# Patient Record
Sex: MALE | Race: WHITE | NOT HISPANIC OR LATINO | Employment: OTHER | URBAN - NONMETROPOLITAN AREA
[De-identification: names, ages, dates, MRNs, and addresses within clinical notes are randomized per-mention and may not be internally consistent; named-entity substitution may affect disease eponyms.]

---

## 2017-01-03 ENCOUNTER — OFFICE VISIT (OUTPATIENT)
Dept: SURGERY | Facility: CLINIC | Age: 57
End: 2017-01-03

## 2017-01-03 VITALS — WEIGHT: 240 LBS | HEIGHT: 73 IN | BODY MASS INDEX: 31.81 KG/M2

## 2017-01-03 DIAGNOSIS — Z48.89 SUTURE CHECK: Primary | ICD-10-CM

## 2017-01-03 PROCEDURE — 99024 POSTOP FOLLOW-UP VISIT: CPT | Performed by: SURGERY

## 2017-01-03 NOTE — MR AVS SNAPSHOT
Delmar Denise .   1/3/2017 11:20 AM   Office Visit    Dept Phone:  [phone]   Encounter #:  [id number]    Provider:  Sascha Hawkins MD   Department:  [facility] GENERAL SURGERY                Your Full Care Plan              Your Updated Medication List          This list is accurate as of: 1/3/17 11:50 AM.  Always use your most recent med list.                amoxicillin-clavulanate 875-125 MG per tablet   Commonly known as:  AUGMENTIN   Take 1 tablet by mouth 2 (Two) Times a Day for 10 days.       gabapentin 300 MG capsule   Commonly known as:  NEURONTIN       ibuprofen 800 MG tablet   Commonly known as:  ADVIL,MOTRIN               You Were Diagnosed With        Codes Comments    Suture check    -  Primary ICD-10-CM: Z48.89  ICD-9-CM: V58.49       Instructions     None    Patient Instructions History      Upcoming Appointments     Visit Type Date Time Department    FOLLOW UP 1/3/2017 11:20 AM [facility]      Inform Directhart Signup     [facility] CloudSplit allows you to send messages to your doctor, view your test results, renew your prescriptions, schedule appointments, and more. To sign up, go to [employer] and click on the Sign Up Now link in the New User? box. Enter your CloudSplit Activation Code exactly as it appears below along with the last four digits of your Social Security Number and your Date of Birth () to complete the sign-up process. If you do not sign up before the expiration date, you must request a new code.    CloudSplit Activation Code: [email or web address]  Expires: 2017  5:38 AM    If you have questions, you can email [email or web address] or call [phone] to talk to our CloudSplit staff. Remember, CloudSplit is NOT to be used for urgent needs. For medical emergencies, dial 911.               Other Info from Your Visit           Allergies     No Known Allergies      Reason for Visit     Follow-up Removal of sutures of  "back      Vital Signs     Height Weight Body Mass Index Smoking Status          73\" (185.4 cm) 240 lb (109 kg) 31.66 kg/m2 Never Smoker        Problems and Diagnoses Noted     Suture check      No Longer an Issue     Sebaceous cyst        "

## 2017-01-03 NOTE — PROGRESS NOTES
Subjective   Delmar Denise Sr. is a 56 y.o. male.     History of Present Illness His wound is healed fine.     The following portions of the patient's history were reviewed and updated as appropriate: allergies, current medications, past family history, past medical history, past social history, past surgical history and problem list.    Review of Systems    Objective   Physical Exam removed sutures.     Assessment/Plan   Delmar was seen today for follow-up.    Diagnoses and all orders for this visit:    Suture check      Return prn

## 2017-01-13 ENCOUNTER — OFFICE VISIT (OUTPATIENT)
Dept: RETAIL CLINIC | Facility: CLINIC | Age: 57
End: 2017-01-13

## 2017-01-13 VITALS
WEIGHT: 282 LBS | OXYGEN SATURATION: 98 % | TEMPERATURE: 98.5 F | RESPIRATION RATE: 18 BRPM | HEART RATE: 92 BPM | BODY MASS INDEX: 37.21 KG/M2

## 2017-01-13 DIAGNOSIS — J02.0 STREP PHARYNGITIS: Primary | ICD-10-CM

## 2017-01-13 LAB
EXPIRATION DATE: ABNORMAL
INTERNAL CONTROL: ABNORMAL
Lab: ABNORMAL
S PYO AG THROAT QL: POSITIVE

## 2017-01-13 PROCEDURE — 87880 STREP A ASSAY W/OPTIC: CPT | Performed by: NURSE PRACTITIONER

## 2017-01-13 PROCEDURE — 99213 OFFICE O/P EST LOW 20 MIN: CPT | Performed by: NURSE PRACTITIONER

## 2017-01-13 RX ORDER — AMOXICILLIN 500 MG/1
500 TABLET, FILM COATED ORAL 2 TIMES DAILY
Qty: 20 TABLET | Refills: 0 | Status: SHIPPED | OUTPATIENT
Start: 2017-01-13 | End: 2017-01-23

## 2017-01-13 NOTE — PROGRESS NOTES
Subjective   Delmar Denise Sr. is a 56 y.o. male.     Chief Complaint   Patient presents with   • Sore Throat      History of Present Illness   Presents to [facility] with c/o onset of sore throat for past few days. Has associated low grade temperature, PND with a cough which worsens at night. Has had ill contact with strep.     The following portions of the patient's history were reviewed and updated as appropriate: allergies, current medications, past family history, past medical history, past social history, past surgical history and problem list.      Current Outpatient Prescriptions:   •  gabapentin (NEURONTIN) 300 MG capsule, Take 300 mg by mouth Every Night., Disp: , Rfl:   •  ibuprofen (ADVIL,MOTRIN) 800 MG tablet, Take 800 mg by mouth Every 6 (Six) Hours As Needed for mild pain (1-3)., Disp: , Rfl:   •  amoxicillin (AMOXIL) 500 MG tablet, Take 1 tablet by mouth 2 (Two) Times a Day for 10 days., Disp: 20 tablet, Rfl: 0    Visit Vitals   • Pulse 92   • Temp 98.5 °F (36.9 °C) (Oral)   • Resp 18   • Wt 282 lb (128 kg)   • SpO2 98%   • BMI 37.21 kg/m2       Review of Systems   Constitutional: Positive for fever. Negative for chills.   HENT: Positive for sore throat (for past days). Negative for drooling, ear pain, facial swelling, rhinorrhea and sinus pressure.    Respiratory: Positive for cough. Negative for choking.    Skin: Negative for color change.   Neurological: Positive for headaches (mild).      No Known Allergies    Physical Exam   Constitutional: He appears well-developed and well-nourished.  Non-toxic appearance. He has a sickly appearance.   HENT:   Right Ear: Ear canal normal. Tympanic membrane is not erythematous and not bulging. Tympanic membrane mobility is normal.   Left Ear: Tympanic membrane and ear canal normal. Tympanic membrane is not erythematous and not bulging. Tympanic membrane mobility is normal.   Nose: No mucosal edema or rhinorrhea. Right sinus exhibits no maxillary sinus tenderness and  no frontal sinus tenderness. Left sinus exhibits no maxillary sinus tenderness and no frontal sinus tenderness.   Mouth/Throat: Uvula is midline. Mucous membranes are not dry. Oropharyngeal exudate and tonsillar abscesses present. Tonsils are 2+ on the right. Tonsils are 2+ on the left. Tonsillar exudate.   Eyes: Conjunctivae are normal.   Cardiovascular: Normal rate and regular rhythm.    Pulmonary/Chest: Breath sounds normal.   Lymphadenopathy:        Head (right side): Tonsillar adenopathy present. No preauricular adenopathy present.        Head (left side): Tonsillar adenopathy present. No preauricular adenopathy present.     He has cervical adenopathy.        Right cervical: Superficial cervical adenopathy present.        Left cervical: Superficial cervical adenopathy present.   Skin: Skin is warm and dry.      Assessment/Plan     Delmar was seen today for sore throat.    Diagnoses and all orders for this visit:    Strep pharyngitis  -     POC Rapid Strep A  -     amoxicillin (AMOXIL) 500 MG tablet; Take 1 tablet by mouth 2 (Two) Times a Day for 10 days.      Lab Results   Component Value Date    RAPSCRN Positive (A) 01/13/2017        Discussed POCT results. Discharge instructions provided.   Follow up with PCP or at the Urgent Care if symptoms worsen or fail to improve within next 48-72 hours.  Patient teaching information discussed and provided to the patient. Patient verbalized understanding.      January 13, 2017 10:24 AM

## 2017-01-13 NOTE — MR AVS SNAPSHOT
Delmar Coffman Camelia Sharpe.   1/13/2017 10:00 AM   Office Visit    Dept Phone:  [phone]   Encounter #:  [id number]    Provider:  ANITHA LIVINGSTON   Department:  [facility]                Your Full Care Plan              Today's Medication Changes          These changes are accurate as of: 1/13/17 10:11 AM.  If you have any questions, ask your nurse or doctor.               New Medication(s)Ordered:     amoxicillin 500 MG tablet   Commonly known as:  AMOXIL   Take 1 tablet by mouth 2 (Two) Times a Day for 10 days.            Where to Get Your Medications      These medications were sent to [id number] - [place], [place] - [address] [place] AT [facility] [address] - [phone] [facility] [phone] FX  [address] [place], [place] [address]     Phone:  [phone]     amoxicillin 500 MG tablet                  Your Updated Medication List          This list is accurate as of: 1/13/17 10:11 AM.  Always use your most recent med list.                amoxicillin 500 MG tablet   Commonly known as:  AMOXIL   Take 1 tablet by mouth 2 (Two) Times a Day for 10 days.       gabapentin 300 MG capsule   Commonly known as:  NEURONTIN       ibuprofen 800 MG tablet   Commonly known as:  ADVIL,MOTRIN               We Performed the Following     POC Rapid Strep A       You Were Diagnosed With        Codes Comments    Strep pharyngitis    -  Primary ICD-10-CM: J02.0  ICD-9-CM: 034.0       Instructions    Strep Throat  Strep throat is an infection of the throat. It is caused by germs. Strep throat spreads from person to person because of coughing, sneezing, or close contact.  HOME CARE  Medicines   · Take over-the-counter and prescription medicines only as told by your doctor.  · Take your antibiotic medicine as told by your doctor. Do not stop taking the medicine even if you feel better.  · Have family members who also have a sore throat or fever go to a doctor.  Eating and  Drinking   · Do not share food, drinking cups, or personal items.  · Try eating soft foods until your sore throat feels better.  · Drink enough fluid to keep your pee (urine) clear or pale yellow.  General Instructions  · Rinse your mouth (gargle) with a salt-water mixture 3-4 times per day or as needed. To make a salt-water mixture, stir ½-1 tsp of salt into 1 cup of warm water.  · Make sure that all people in your house wash their hands well.  · Rest.  · Stay home from school or work until you have been taking antibiotics for 24 hours.  · Keep all follow-up visits as told by your doctor. This is important.  GET HELP IF:  · Your neck keeps getting bigger.  · You get a rash, cough, or earache.  · You cough up thick liquid that is green, yellow-brown, or bloody.  · You have pain that does not get better with medicine.  · Your problems get worse instead of getting better.  · You have a fever.  GET HELP RIGHT AWAY IF:  · You throw up (vomit).  · You get a very bad headache.  · You neck hurts or it feels stiff.  · You have chest pain or you are short of breath.  · You have drooling, very bad throat pain, or changes in your voice.  · Your neck is swollen or the skin gets red and tender.  · Your mouth is dry or you are peeing less than normal.  · You keep feeling more tired or it is hard to wake up.  · Your joints are red or they hurt.     This information is not intended to replace advice given to you by your health care provider. Make sure you discuss any questions you have with your health care provider.     Document Released: 06/05/2009 Document Revised: 09/07/2016 Document Reviewed: 04/11/2016  [employer] Interactive Patient Education ©2016 [employer]       Patient Instructions History      Upcoming Appointments     Visit Type Date Time Department    OFFICE VISIT 1/13/2017 10:00 AM LARRY Forbes Signup     [facility] Consumer Agent Portal (CAP)[facility] allows you to send messages to your doctor, view your test results, renew  your prescriptions, schedule appointments, and more. To sign up, go to [email or web address] and click on the Sign Up Now link in the New User? box. Enter your [employer] Activation Code exactly as it appears below along with the last four digits of your Social Security Number and your Date of Birth () to complete the sign-up process. If you do not sign up before the expiration date, you must request a new code.    [employer] Activation Code: [email or web address]  Expires: 2017  5:38 AM    If you have questions, you can email [email or web address] or call [phone] to talk to our [employer] staff. Remember, [employer] is NOT to be used for urgent needs. For medical emergencies, dial 911.               Other Info from Your Visit           Allergies     No Known Allergies      Reason for Visit     Sore Throat           Vital Signs     Smoking Status                   Never Smoker           Problems and Diagnoses Noted     Strep throat    -  Primary

## 2019-01-27 ENCOUNTER — OFFICE VISIT (OUTPATIENT)
Dept: RETAIL CLINIC | Facility: CLINIC | Age: 59
End: 2019-01-27

## 2019-01-27 VITALS
SYSTOLIC BLOOD PRESSURE: 150 MMHG | DIASTOLIC BLOOD PRESSURE: 88 MMHG | OXYGEN SATURATION: 99 % | HEART RATE: 74 BPM | BODY MASS INDEX: 37.57 KG/M2 | TEMPERATURE: 98.2 F | WEIGHT: 284.8 LBS | RESPIRATION RATE: 18 BRPM

## 2019-01-27 DIAGNOSIS — J01.00 ACUTE MAXILLARY SINUSITIS, RECURRENCE NOT SPECIFIED: Primary | ICD-10-CM

## 2019-01-27 DIAGNOSIS — I10 HYPERTENSION, UNSPECIFIED TYPE: ICD-10-CM

## 2019-01-27 PROCEDURE — 99213 OFFICE O/P EST LOW 20 MIN: CPT | Performed by: NURSE PRACTITIONER

## 2019-01-27 RX ORDER — AMOXICILLIN AND CLAVULANATE POTASSIUM 875; 125 MG/1; MG/1
1 TABLET, FILM COATED ORAL EVERY 12 HOURS SCHEDULED
Qty: 20 TABLET | Refills: 0 | Status: SHIPPED | OUTPATIENT
Start: 2019-01-27 | End: 2019-02-06

## 2019-01-27 RX ORDER — MELOXICAM 15 MG/1
15 TABLET ORAL DAILY
COMMUNITY

## 2019-01-27 RX ORDER — FLUTICASONE PROPIONATE 50 MCG
2 SPRAY, SUSPENSION (ML) NASAL DAILY
Qty: 1 BOTTLE | Refills: 0 | Status: SHIPPED | OUTPATIENT
Start: 2019-01-27 | End: 2020-08-21

## 2020-08-20 ENCOUNTER — OFFICE VISIT (OUTPATIENT)
Dept: SURGERY | Facility: CLINIC | Age: 60
End: 2020-08-20

## 2020-08-20 VITALS
WEIGHT: 265 LBS | DIASTOLIC BLOOD PRESSURE: 101 MMHG | SYSTOLIC BLOOD PRESSURE: 141 MMHG | HEIGHT: 73 IN | BODY MASS INDEX: 35.12 KG/M2 | HEART RATE: 88 BPM

## 2020-08-20 DIAGNOSIS — Z01.818 PRE-OP TESTING: ICD-10-CM

## 2020-08-20 DIAGNOSIS — D17.0 LIPOMA OF HEAD: Primary | ICD-10-CM

## 2020-08-20 DIAGNOSIS — Z01.818 PREOP TESTING: Primary | ICD-10-CM

## 2020-08-20 PROCEDURE — 99202 OFFICE O/P NEW SF 15 MIN: CPT | Performed by: SURGERY

## 2020-08-20 RX ORDER — CEFAZOLIN SODIUM 2 G/50ML
2 SOLUTION INTRAVENOUS ONCE
Status: CANCELLED | OUTPATIENT
Start: 2020-08-20 | End: 2020-08-20

## 2020-08-20 NOTE — PROGRESS NOTES
"Subjective   Delmar Denise Sr. is a 60 y.o. male is being seen for consultation today for evaluation of a lipoma    History of Present Illness  Mr. Denise was seen in the office today for follow-up of a symptomatic lipoma.  The patient states that it has been present for 2 years but now has gotten larger and it is sore, particularly when he lays on it to sleep at night.  He states if he turns on that side it will wake him up from sleep.  No Known Allergies  Current Outpatient Medications   Medication Sig Dispense Refill   • fluticasone (FLONASE) 50 MCG/ACT nasal spray 2 sprays into the nostril(s) as directed by provider Daily. 1 bottle 0   • gabapentin (NEURONTIN) 300 MG capsule Take 300 mg by mouth Every Night.     • MELOXICAM PO Take  by mouth.       No current facility-administered medications for this visit.      Past Medical History:   Diagnosis Date   • Arthritis    • Back pain    • Hypertension    • Sinusitis    • Strep throat    • Urinary tract infection      Past Surgical History:   Procedure Laterality Date   • KNEE SURGERY     • KNEE SURGERY      bilateraly   • SHOULDER SURGERY     • SHOULDER SURGERY      bilateral     Review of Systems   Musculoskeletal: Positive for arthralgias, joint swelling, myalgias and neck stiffness.   Neurological: Positive for headaches.     General: weight gain 20 Lbs  Integumentary: negative  Eyes: negative  ENT: negative  Respiratory: negative  Gastrointestinal: negative  Cardiovascular: negative  Neurological: headaches  Psychiatric: negative  Hematologic/Lymphatic: negative  Genitourinary: negative  Musculoskeletal: painful joints  Endocrine: negative  Breasts: negative    The following portions of the patient's history were reviewed and updated as appropriate: allergies, current medications, past family history, past medical history, past social history, past surgical history and problem list.    Objective   BP (!) 141/101   Pulse 88   Ht 185.4 cm (73\")   Wt 120 kg (265 " lb)   BMI 34.96 kg/m²   Physical Exam  General:  This is a WD WN male in no acute distress  HEENT exam:  WNL. Sclera are anicteric.  EOMI  Neck:  supple, FROM, without thyromegaly, cervical or supraclavicular adenopathy  Lungs:  Respiratory effort normal. Auscultation: Clear, without wheezes, rhonchi, rales  Heart:  Regular rate and rhythm, without murmur, gallop, rub.  No pedal edema  Musculoskeletal:  muscle strength/tone is normal.  Gait and station: normal. No digital cyanosis  Psyc:  alert, oriented x 3.  Mood and affect are appropriate  skin:  Warm with good turgor.  On the left posterior lateral scalp there is a 3 x 3 cm subcutaneous mass consistent with a lipoma  extremities:  Examination of the extremities revealed no cyanosis, clubbing or edema.    Results/Data    Procedures       Assessment/Plan   Symptomatic lipoma of scalp    Proceed with excision under anesthesia.  Given size and location this is best done in the OR       Discussion/Summary    Patient's Body mass index is 34.96 kg/m². BMI is above normal parameters. Recommendations include: educational material.       No future appointments.      Please note that portions of this note were completed with a voice recognition program.

## 2020-08-21 ENCOUNTER — APPOINTMENT (OUTPATIENT)
Dept: PREADMISSION TESTING | Facility: HOSPITAL | Age: 60
End: 2020-08-21

## 2020-08-21 ENCOUNTER — LAB (OUTPATIENT)
Dept: LAB | Facility: HOSPITAL | Age: 60
End: 2020-08-21

## 2020-08-21 DIAGNOSIS — Z01.818 PRE-OP TESTING: ICD-10-CM

## 2020-08-21 DIAGNOSIS — D17.0 LIPOMA OF HEAD: ICD-10-CM

## 2020-08-21 DIAGNOSIS — Z01.818 PREOP TESTING: ICD-10-CM

## 2020-08-21 LAB
ANION GAP SERPL CALCULATED.3IONS-SCNC: 9.5 MMOL/L (ref 5–15)
BUN SERPL-MCNC: 24 MG/DL (ref 8–23)
BUN/CREAT SERPL: 24.2 (ref 7–25)
CALCIUM SPEC-SCNC: 9.2 MG/DL (ref 8.6–10.5)
CHLORIDE SERPL-SCNC: 105 MMOL/L (ref 98–107)
CO2 SERPL-SCNC: 22.5 MMOL/L (ref 22–29)
CREAT SERPL-MCNC: 0.99 MG/DL (ref 0.76–1.27)
DEPRECATED RDW RBC AUTO: 43.7 FL (ref 37–54)
ERYTHROCYTE [DISTWIDTH] IN BLOOD BY AUTOMATED COUNT: 13.6 % (ref 12.3–15.4)
GFR SERPL CREATININE-BSD FRML MDRD: 77 ML/MIN/1.73
GLUCOSE SERPL-MCNC: 87 MG/DL (ref 65–99)
HCT VFR BLD AUTO: 44.7 % (ref 37.5–51)
HGB BLD-MCNC: 14.2 G/DL (ref 13–17.7)
MCH RBC QN AUTO: 27.7 PG (ref 26.6–33)
MCHC RBC AUTO-ENTMCNC: 31.8 G/DL (ref 31.5–35.7)
MCV RBC AUTO: 87.3 FL (ref 79–97)
PLATELET # BLD AUTO: 207 10*3/MM3 (ref 140–450)
PMV BLD AUTO: 9 FL (ref 6–12)
POTASSIUM SERPL-SCNC: 4.6 MMOL/L (ref 3.5–5.2)
RBC # BLD AUTO: 5.12 10*6/MM3 (ref 4.14–5.8)
SODIUM SERPL-SCNC: 137 MMOL/L (ref 136–145)
WBC # BLD AUTO: 6.61 10*3/MM3 (ref 3.4–10.8)

## 2020-08-21 PROCEDURE — U0002 COVID-19 LAB TEST NON-CDC: HCPCS

## 2020-08-21 PROCEDURE — 80048 BASIC METABOLIC PNL TOTAL CA: CPT | Performed by: SURGERY

## 2020-08-21 PROCEDURE — 85027 COMPLETE CBC AUTOMATED: CPT | Performed by: SURGERY

## 2020-08-21 PROCEDURE — 36415 COLL VENOUS BLD VENIPUNCTURE: CPT

## 2020-08-21 PROCEDURE — C9803 HOPD COVID-19 SPEC COLLECT: HCPCS | Performed by: SURGERY

## 2020-08-21 PROCEDURE — U0004 COV-19 TEST NON-CDC HGH THRU: HCPCS

## 2020-08-21 NOTE — DISCHARGE INSTRUCTIONS
TAKE the following medications the morning of surgery:    All heart or blood pressure medications    Please discontinue all blood thinners and anticoagulants (except aspirin) prior to surgery as per your surgeon and cardiologist instructions.  Aspirin may be continued up to the day prior to surgery.    HOLD all diabetic medications the morning of surgery as order by physician.    Arrival time for surgery on 8/25/20 will be given to you by Dr. Celaya's office.    A RESPONSIBLE PERSON MUST REMAIN IN THE WAITING ROOM DURING YOUR PROCEDURE AND A RESPONSIBLE  MUST BE AVAILABLE UPON YOUR DISCHARGE.    General Instructions:  • Do NOT eat or drink after midnight 8/24/20 which includes water, mints, or gum.  • You may brush your teeth. Dental appliances that are removable must be taken out day of surgery.  • Do NOT smoke, chew tobacco, or drink alcohol within 24 hours prior to surgery.  • Bring medications in original bottles, any inhalers and if applicable your C-PAP/BI-PAP machine  • Bring any papers given to you in the doctor’s office  • Wear clean, comfortable clothes and socks  • Do NOT wear contact lenses or make-up or dark nail polish.  Bring a case for your glasses if applicable.  • Bring crutches or walker if applicable  • Leave all other valuables and jewelry at home  • If you were given a blood bank armband, continue to wear it until discharged.    Preventing a Surgical Site Infection:  • Shower the night before surgery (unless instructed otherwise) using a fresh bar of anti-bacterial soap (such as Dial) and clean washcloth.  Dry with a clean towel and dress in clean clothing.  • For 2 to 3 days before surgery, avoid shaving with a razor near where you will have surgery because the razor can irritate skin and make it easier to develop an infection.  Ask your surgeon if you will be receiving antibiotics prior to surgery.  • Make sure you, your family, and all healthcare providers clean their hands with soap  and water or an alcohol-based hand  before caring for you or your wound.  • If at all possible, quit smoking as many days before surgery as you can.    Day of Surgery:  Upon arrival, a pre-op nurse and anesthesiologist will review your health history, obtain vital signs, and answer questions you may have.  The only belongings needed at this time will be your home medications and if applicable you C-PAP/BI-PAP machine.  If you are staying overnight, your family can leave the rest of your belongings in the car and bring them to your room later.  A pre-op nurse will start an IV and you may receive medication in preparation for surgery.  Due to patient privacy and limited space, only one member of your family will be able to accompany you in the pre-op area.  While you are in surgery your family should notify the waiting room  if they leave the waiting room area and provide a contact number.  Please be aware that surgery does come with discomfort.  We want to make every effort to control your discomfort so please discuss any uncontrolled symptoms with your nurse.  Your doctor will most likely have prescribed pain medications.  If you are going home after surgery you will receive individualized written care instructions before being discharged.  A responsible adult must drive you to and from the hospital on the day of surgery and stay with you for 24 hours.  If you are staying overnight following surgery, you will be transported to your hospital room following the recovery period.

## 2020-08-22 LAB
REF LAB TEST METHOD: NORMAL
SARS-COV-2 RNA RESP QL NAA+PROBE: NOT DETECTED

## 2020-08-22 NOTE — H&P
"Subjective   Delmar Denise Sr. is a 60 y.o. male is being seen for consultation today for evaluation of a lipoma    History of Present Illness  Mr. Denise was seen in the office today for follow-up of a symptomatic lipoma.  The patient states that it has been present for 2 years but now has gotten larger and it is sore, particularly when he lays on it to sleep at night.  He states if he turns on that side it will wake him up from sleep.  No Known Allergies  Current Outpatient Medications   Medication Sig Dispense Refill   • fluticasone (FLONASE) 50 MCG/ACT nasal spray 2 sprays into the nostril(s) as directed by provider Daily. 1 bottle 0   • gabapentin (NEURONTIN) 300 MG capsule Take 300 mg by mouth Every Night.     • MELOXICAM PO Take  by mouth.       No current facility-administered medications for this visit.      Past Medical History:   Diagnosis Date   • Arthritis    • Back pain    • Hypertension    • Sinusitis    • Strep throat    • Urinary tract infection      Past Surgical History:   Procedure Laterality Date   • KNEE SURGERY     • KNEE SURGERY      bilateraly   • SHOULDER SURGERY     • SHOULDER SURGERY      bilateral     Review of Systems   Musculoskeletal: Positive for arthralgias, joint swelling, myalgias and neck stiffness.   Neurological: Positive for headaches.     General: weight gain 20 Lbs  Integumentary: negative  Eyes: negative  ENT: negative  Respiratory: negative  Gastrointestinal: negative  Cardiovascular: negative  Neurological: headaches  Psychiatric: negative  Hematologic/Lymphatic: negative  Genitourinary: negative  Musculoskeletal: painful joints  Endocrine: negative  Breasts: negative    The following portions of the patient's history were reviewed and updated as appropriate: allergies, current medications, past family history, past medical history, past social history, past surgical history and problem list.    Objective   BP (!) 141/101   Pulse 88   Ht 185.4 cm (73\")   Wt 120 kg (265 " lb)   BMI 34.96 kg/m²    Physical Exam  General:  This is a WD WN male in no acute distress  HEENT exam:  WNL. Sclera are anicteric.  EOMI  Neck:  supple, FROM, without thyromegaly, cervical or supraclavicular adenopathy  Lungs:  Respiratory effort normal. Auscultation: Clear, without wheezes, rhonchi, rales  Heart:  Regular rate and rhythm, without murmur, gallop, rub.  No pedal edema  Musculoskeletal:  muscle strength/tone is normal.  Gait and station: normal. No digital cyanosis  Psyc:  alert, oriented x 3.  Mood and affect are appropriate  skin:  Warm with good turgor.  On the left posterior lateral scalp there is a 3 x 3 cm subcutaneous mass consistent with a lipoma  extremities:  Examination of the extremities revealed no cyanosis, clubbing or edema.    Results/Data    Procedures       Assessment/Plan   Symptomatic lipoma of scalp    Proceed with excision under anesthesia.  Given size and location this is best done in the OR       Discussion/Summary    Patient's Body mass index is 34.96 kg/m². BMI is above normal parameters. Recommendations include: educational material.       No future appointments.      Please note that portions of this note were completed with a voice recognition program.  This document has been electronically signed by Sravani SINHA MD on August 22, 2020 19:54

## 2020-08-24 ENCOUNTER — TELEPHONE (OUTPATIENT)
Dept: SURGERY | Facility: CLINIC | Age: 60
End: 2020-08-24

## 2020-08-25 ENCOUNTER — HOSPITAL ENCOUNTER (OUTPATIENT)
Facility: HOSPITAL | Age: 60
Setting detail: HOSPITAL OUTPATIENT SURGERY
Discharge: HOME | End: 2020-08-25
Attending: SURGERY | Admitting: SURGERY

## 2020-08-25 ENCOUNTER — ANESTHESIA EVENT (OUTPATIENT)
Dept: PERIOP | Facility: HOSPITAL | Age: 60
End: 2020-08-25

## 2020-08-25 ENCOUNTER — ANESTHESIA (OUTPATIENT)
Dept: PERIOP | Facility: HOSPITAL | Age: 60
End: 2020-08-25

## 2020-08-25 VITALS
HEART RATE: 82 BPM | SYSTOLIC BLOOD PRESSURE: 128 MMHG | WEIGHT: 286.2 LBS | TEMPERATURE: 97.8 F | DIASTOLIC BLOOD PRESSURE: 82 MMHG | HEIGHT: 73 IN | OXYGEN SATURATION: 100 % | BODY MASS INDEX: 37.93 KG/M2 | RESPIRATION RATE: 14 BRPM

## 2020-08-25 DIAGNOSIS — D17.0 LIPOMA OF HEAD: ICD-10-CM

## 2020-08-25 PROCEDURE — 25010000003 CEFAZOLIN SODIUM-DEXTROSE 2-3 GM-%(50ML) RECONSTITUTED SOLUTION: Performed by: SURGERY

## 2020-08-25 PROCEDURE — 21014 EXC FACE TUM DEEP 2 CM/>: CPT | Performed by: SURGERY

## 2020-08-25 PROCEDURE — 25010000002 ONDANSETRON PER 1 MG: Performed by: NURSE ANESTHETIST, CERTIFIED REGISTERED

## 2020-08-25 PROCEDURE — 25010000002 FENTANYL CITRATE (PF) 100 MCG/2ML SOLUTION: Performed by: NURSE ANESTHETIST, CERTIFIED REGISTERED

## 2020-08-25 PROCEDURE — 25010000002 PROPOFOL 10 MG/ML EMULSION: Performed by: NURSE ANESTHETIST, CERTIFIED REGISTERED

## 2020-08-25 PROCEDURE — 88304 TISSUE EXAM BY PATHOLOGIST: CPT | Performed by: SURGERY

## 2020-08-25 RX ORDER — FAMOTIDINE 10 MG/ML
INJECTION, SOLUTION INTRAVENOUS AS NEEDED
Status: DISCONTINUED | OUTPATIENT
Start: 2020-08-25 | End: 2020-08-25 | Stop reason: SURG

## 2020-08-25 RX ORDER — FENTANYL CITRATE 50 UG/ML
50 INJECTION, SOLUTION INTRAMUSCULAR; INTRAVENOUS
Status: DISCONTINUED | OUTPATIENT
Start: 2020-08-25 | End: 2020-08-25 | Stop reason: HOSPADM

## 2020-08-25 RX ORDER — SODIUM CHLORIDE 0.9 % (FLUSH) 0.9 %
10 SYRINGE (ML) INJECTION EVERY 12 HOURS SCHEDULED
Status: DISCONTINUED | OUTPATIENT
Start: 2020-08-25 | End: 2020-08-25 | Stop reason: HOSPADM

## 2020-08-25 RX ORDER — SODIUM CHLORIDE, SODIUM LACTATE, POTASSIUM CHLORIDE, CALCIUM CHLORIDE 600; 310; 30; 20 MG/100ML; MG/100ML; MG/100ML; MG/100ML
125 INJECTION, SOLUTION INTRAVENOUS CONTINUOUS
Status: DISCONTINUED | OUTPATIENT
Start: 2020-08-25 | End: 2020-08-25 | Stop reason: HOSPADM

## 2020-08-25 RX ORDER — BUPIVACAINE HYDROCHLORIDE AND EPINEPHRINE 2.5; 5 MG/ML; UG/ML
INJECTION, SOLUTION EPIDURAL; INFILTRATION; INTRACAUDAL; PERINEURAL AS NEEDED
Status: DISCONTINUED | OUTPATIENT
Start: 2020-08-25 | End: 2020-08-25 | Stop reason: HOSPADM

## 2020-08-25 RX ORDER — SODIUM CHLORIDE 0.9 % (FLUSH) 0.9 %
10 SYRINGE (ML) INJECTION AS NEEDED
Status: DISCONTINUED | OUTPATIENT
Start: 2020-08-25 | End: 2020-08-25 | Stop reason: HOSPADM

## 2020-08-25 RX ORDER — FENTANYL CITRATE 50 UG/ML
INJECTION, SOLUTION INTRAMUSCULAR; INTRAVENOUS AS NEEDED
Status: DISCONTINUED | OUTPATIENT
Start: 2020-08-25 | End: 2020-08-25 | Stop reason: SURG

## 2020-08-25 RX ORDER — CEFAZOLIN SODIUM 2 G/50ML
2 SOLUTION INTRAVENOUS ONCE
Status: COMPLETED | OUTPATIENT
Start: 2020-08-25 | End: 2020-08-25

## 2020-08-25 RX ORDER — MIDAZOLAM HYDROCHLORIDE 1 MG/ML
1 INJECTION INTRAMUSCULAR; INTRAVENOUS
Status: DISCONTINUED | OUTPATIENT
Start: 2020-08-25 | End: 2020-08-25 | Stop reason: HOSPADM

## 2020-08-25 RX ORDER — ONDANSETRON 2 MG/ML
4 INJECTION INTRAMUSCULAR; INTRAVENOUS AS NEEDED
Status: DISCONTINUED | OUTPATIENT
Start: 2020-08-25 | End: 2020-08-25 | Stop reason: HOSPADM

## 2020-08-25 RX ORDER — OXYCODONE HYDROCHLORIDE AND ACETAMINOPHEN 5; 325 MG/1; MG/1
1 TABLET ORAL ONCE AS NEEDED
Status: DISCONTINUED | OUTPATIENT
Start: 2020-08-25 | End: 2020-08-25 | Stop reason: HOSPADM

## 2020-08-25 RX ORDER — IPRATROPIUM BROMIDE AND ALBUTEROL SULFATE 2.5; .5 MG/3ML; MG/3ML
3 SOLUTION RESPIRATORY (INHALATION) ONCE AS NEEDED
Status: DISCONTINUED | OUTPATIENT
Start: 2020-08-25 | End: 2020-08-25 | Stop reason: HOSPADM

## 2020-08-25 RX ORDER — MAGNESIUM HYDROXIDE 1200 MG/15ML
LIQUID ORAL AS NEEDED
Status: DISCONTINUED | OUTPATIENT
Start: 2020-08-25 | End: 2020-08-25 | Stop reason: HOSPADM

## 2020-08-25 RX ORDER — HYDROCODONE BITARTRATE AND ACETAMINOPHEN 7.5; 325 MG/1; MG/1
1 TABLET ORAL 4 TIMES DAILY PRN
Qty: 6 TABLET | Refills: 0 | Status: ON HOLD | OUTPATIENT
Start: 2020-08-25 | End: 2022-06-29

## 2020-08-25 RX ORDER — ONDANSETRON 2 MG/ML
INJECTION INTRAMUSCULAR; INTRAVENOUS AS NEEDED
Status: DISCONTINUED | OUTPATIENT
Start: 2020-08-25 | End: 2020-08-25 | Stop reason: SURG

## 2020-08-25 RX ORDER — PROPOFOL 10 MG/ML
VIAL (ML) INTRAVENOUS AS NEEDED
Status: DISCONTINUED | OUTPATIENT
Start: 2020-08-25 | End: 2020-08-25 | Stop reason: SURG

## 2020-08-25 RX ORDER — MEPERIDINE HYDROCHLORIDE 25 MG/ML
12.5 INJECTION INTRAMUSCULAR; INTRAVENOUS; SUBCUTANEOUS
Status: DISCONTINUED | OUTPATIENT
Start: 2020-08-25 | End: 2020-08-25 | Stop reason: HOSPADM

## 2020-08-25 RX ADMIN — SODIUM CHLORIDE, POTASSIUM CHLORIDE, SODIUM LACTATE AND CALCIUM CHLORIDE: 600; 310; 30; 20 INJECTION, SOLUTION INTRAVENOUS at 12:51

## 2020-08-25 RX ADMIN — FENTANYL CITRATE 50 MCG: 50 INJECTION INTRAMUSCULAR; INTRAVENOUS at 13:12

## 2020-08-25 RX ADMIN — ONDANSETRON 4 MG: 2 INJECTION INTRAMUSCULAR; INTRAVENOUS at 12:51

## 2020-08-25 RX ADMIN — FENTANYL CITRATE 50 MCG: 50 INJECTION INTRAMUSCULAR; INTRAVENOUS at 13:35

## 2020-08-25 RX ADMIN — PROPOFOL 150 MG: 10 INJECTION, EMULSION INTRAVENOUS at 12:55

## 2020-08-25 RX ADMIN — FAMOTIDINE 20 MG: 10 INJECTION INTRAVENOUS at 12:51

## 2020-08-25 RX ADMIN — CEFAZOLIN SODIUM 2 G: 2 SOLUTION INTRAVENOUS at 12:53

## 2020-08-25 NOTE — ANESTHESIA POSTPROCEDURE EVALUATION
Patient: Delmar Denise Sr.    Procedure Summary     Date:  08/25/20 Room / Location:   [facility] OR 01 /  [facility]    Anesthesia Start:  1252 Anesthesia Stop:  1342    Procedure:  Excision lipoma left posterior scalp (Left ) Diagnosis:       Lipoma of head      (Lipoma of head [D17.0])    Surgeon:  Sravani Celaya MD Provider:  Serge Shaw DO    Anesthesia Type:  general ASA Status:  3          Anesthesia Type: general    Vitals  Vitals Value Taken Time   /75 8/25/2020  2:10 PM   Temp 99 °F (37.2 °C) 8/25/2020  1:42 PM   Pulse 75 8/25/2020  2:10 PM   Resp 14 8/25/2020  2:10 PM   SpO2 100 % 8/25/2020  2:10 PM           Post Anesthesia Care and Evaluation    Patient location during evaluation: PHASE II  Patient participation: complete - patient participated  Level of consciousness: awake and alert  Pain score: 0  Pain management: adequate  Airway patency: patent  Anesthetic complications: No anesthetic complications  PONV Status: controlled  Cardiovascular status: acceptable  Respiratory status: acceptable and room air  Hydration status: euvolemic  No anesthesia care post op

## 2020-08-25 NOTE — OP NOTE
Excision lipoma posterior scalp    Pre-op: Lipoma posterior scalp    Post-op:  Same    Surgeon:  Sravani Celaya M.D., F.A.C.S.    Assistant: Misael Aponte    Anesthesia:  general      Indications: Symptomatic lipoma posterior scalp       Procedure Details   After obtaining informed consent and receiving preoperative antibiotics and with venous compression boots in place, the patient was taken to the operating room and placed under anesthesia.  He was placed in the lateral position.  A transverse incision over the area of the lipoma was made and carried down into the subcu.  The lipoma was identified and freed from the surrounding tissue.  It was adherent to the underlying fascia.  After excision hemostasis was obtained and the incision was closed in a 2 layer closure of interrupted 3-0 Vicryl subdermal sutures, followed by 4-0 Monocryl.  Dressing was placed.  Patient tolerated the procedure well and was taken to the recovery room in stable condition    Findings:  Size of lesion 2.1 x 2.9 cm.  Skin  Skin incision length 3.1 cm    Estimated Blood Loss:  Minimal    Blood Administered: none           Drains: none           Specimens:   ID Type Source Tests Collected by Time   A : lipoma from left posterior scalp Skin Scalp TISSUE PATHOLOGY EXAM Sravani Celaya MD 8/25/2020 1324        Grafts and Implants: No       Complications:  none           Disposition: PACU - hemodynamically stable.           Condition: stable

## 2020-08-25 NOTE — ANESTHESIA PROCEDURE NOTES
Airway  Urgency: elective    Date/Time: 8/25/2020 12:55 PM  Airway not difficult    General Information and Staff    Patient location during procedure: OR  CRNA: Nevaeh Beauchamp CRNA    Indications and Patient Condition  Indications for airway management: airway protection    Preoxygenated: yes  Mask difficulty assessment: 0 - not attempted    Final Airway Details  Final airway type: supraglottic airway      Successful airway: classic and unique  Size 4    Number of attempts at approach: 1  Assessment: lips, teeth, and gum same as pre-op

## 2020-08-25 NOTE — ANESTHESIA PREPROCEDURE EVALUATION
Anesthesia Evaluation     Patient summary reviewed and Nursing notes reviewed   no history of anesthetic complications:  NPO Solid Status: > 8 hours  NPO Liquid Status: > 8 hours           Airway   Mallampati: II  TM distance: >3 FB  Neck ROM: full  No difficulty expected  Dental - normal exam     Pulmonary - normal exam    breath sounds clear to auscultation  (+) recent URI,   Cardiovascular - normal exam    Rhythm: regular  Rate: normal    (+) hypertension,       Neuro/Psych- negative ROS  GI/Hepatic/Renal/Endo    (+) obesity,       Musculoskeletal     (+) back pain,   Abdominal   (+) obese,     Bowel sounds: normal.   Substance History - negative use     OB/GYN negative ob/gyn ROS         Other   arthritis,                    Anesthesia Plan    ASA 3     general   (Denies CP/syncope/dyspnea. )  intravenous induction     Anesthetic plan, all risks, benefits, and alternatives have been provided, discussed and informed consent has been obtained with: patient.    Plan discussed with CRNA.

## 2020-08-27 LAB
LAB AP CASE REPORT: NORMAL
PATH REPORT.FINAL DX SPEC: NORMAL

## 2020-09-03 ENCOUNTER — OFFICE VISIT (OUTPATIENT)
Dept: SURGERY | Facility: CLINIC | Age: 60
End: 2020-09-03

## 2020-09-03 VITALS
WEIGHT: 290 LBS | SYSTOLIC BLOOD PRESSURE: 161 MMHG | BODY MASS INDEX: 38.43 KG/M2 | DIASTOLIC BLOOD PRESSURE: 98 MMHG | HEART RATE: 83 BPM | HEIGHT: 73 IN

## 2020-09-03 DIAGNOSIS — Z09 POSTOP CHECK: ICD-10-CM

## 2020-09-03 DIAGNOSIS — D17.0 LIPOMA OF HEAD: Primary | ICD-10-CM

## 2020-09-03 PROCEDURE — 99024 POSTOP FOLLOW-UP VISIT: CPT | Performed by: SURGERY

## 2020-09-03 NOTE — PROGRESS NOTES
"Subjective   Delmar Denise Sr. is a 60 y.o. male here today for post op.    History of Present Illness  Mr. Denise was seen in the office today for his first postoperative visit following excision of a lipoma on the left posterior scalp.  He did have some swelling but states this is already gone down.  He voices no other complaints.    No Known Allergies      Current Outpatient Medications   Medication Sig Dispense Refill   • gabapentin (NEURONTIN) 300 MG capsule Take 300 mg by mouth Every Night.     • MELOXICAM PO Take  by mouth.     • HYDROcodone-acetaminophen (Norco) 7.5-325 MG per tablet Take 1 tablet by mouth 4 (Four) Times a Day As Needed for Moderate Pain . 6 tablet 0     No current facility-administered medications for this visit.              Objective   /98 (BP Location: Left arm)   Pulse 83   Ht 185.4 cm (73\")   Wt 132 kg (290 lb)   BMI 38.26 kg/m²    Physical Exam  On examination this is a well-developed well-nourished male in no acute distress  HEENT examination: Within normal limits.  Conjunctiva pink.  Nose and ears appear normal.  Neck: Supple, full range of motion.  No JVD.  Musculoskeletal: Full range of motion all extremities without focal weakness. Normal gait. No digital cyanosis.  Psych: Patient is alert, oriented x3. Mood and affect are appropriate.  Skin: On the left posterior lateral scalp there is a healing surgical site with a small seroma.  No erythema or drainage.  Results/Data      Procedures     Assessment/Plan   Stable course, status post lipoma of scalp excision    Follow-up as needed       Discussion/Summary  Patient's Body mass index is 38.26 kg/m². BMI is above normal parameters. Recommendations include: educational material.    Future Appointments   Date Time Provider Department Center   9/3/2020  9:15 AM Sravani Celaya MD [facility] None         Please note that portions of this note were completed with a voice recognition program.  "

## 2020-10-26 ENCOUNTER — TRANSCRIBE ORDERS (OUTPATIENT)
Dept: ADMINISTRATIVE | Facility: HOSPITAL | Age: 60
End: 2020-10-26

## 2020-10-26 DIAGNOSIS — Z01.818 PRE-OPERATIVE CLEARANCE: Primary | ICD-10-CM

## 2020-10-27 ENCOUNTER — TRANSCRIBE ORDERS (OUTPATIENT)
Dept: ADMINISTRATIVE | Facility: HOSPITAL | Age: 60
End: 2020-10-27

## 2020-10-27 DIAGNOSIS — Z01.818 PRE-OPERATIVE CLEARANCE: Primary | ICD-10-CM

## 2020-10-28 ENCOUNTER — LAB (OUTPATIENT)
Dept: LAB | Facility: HOSPITAL | Age: 60
End: 2020-10-28

## 2020-10-28 DIAGNOSIS — Z01.818 PRE-OPERATIVE CLEARANCE: ICD-10-CM

## 2020-10-28 LAB — SARS-COV-2 RNA RESP QL NAA+PROBE: NOT DETECTED

## 2020-10-28 PROCEDURE — U0004 COV-19 TEST NON-CDC HGH THRU: HCPCS | Performed by: INTERNAL MEDICINE

## 2020-10-28 PROCEDURE — C9803 HOPD COVID-19 SPEC COLLECT: HCPCS

## 2021-02-25 DIAGNOSIS — Z23 IMMUNIZATION DUE: ICD-10-CM

## 2022-06-07 ENCOUNTER — HOSPITAL ENCOUNTER (OUTPATIENT)
Dept: GENERAL RADIOLOGY | Facility: HOSPITAL | Age: 62
Discharge: HOME | End: 2022-06-07
Admitting: NURSE PRACTITIONER

## 2022-06-07 ENCOUNTER — TRANSCRIBE ORDERS (OUTPATIENT)
Dept: ADMINISTRATIVE | Facility: HOSPITAL | Age: 62
End: 2022-06-07

## 2022-06-07 DIAGNOSIS — M25.552 LEFT HIP PAIN: ICD-10-CM

## 2022-06-07 DIAGNOSIS — M25.551 RIGHT HIP PAIN: Primary | ICD-10-CM

## 2022-06-07 DIAGNOSIS — M25.551 RIGHT HIP PAIN: ICD-10-CM

## 2022-06-07 PROCEDURE — 72110 X-RAY EXAM L-2 SPINE 4/>VWS: CPT | Performed by: RADIOLOGY

## 2022-06-07 PROCEDURE — 73523 X-RAY EXAM HIPS BI 5/> VIEWS: CPT | Performed by: RADIOLOGY

## 2022-06-07 PROCEDURE — 73523 X-RAY EXAM HIPS BI 5/> VIEWS: CPT

## 2022-06-07 PROCEDURE — 72110 X-RAY EXAM L-2 SPINE 4/>VWS: CPT

## 2022-06-29 ENCOUNTER — APPOINTMENT (OUTPATIENT)
Dept: GENERAL RADIOLOGY | Facility: HOSPITAL | Age: 62
End: 2022-06-29

## 2022-06-29 ENCOUNTER — HOSPITAL ENCOUNTER (INPATIENT)
Facility: HOSPITAL | Age: 62
LOS: 1 days | Discharge: HOME | End: 2022-06-30
Attending: EMERGENCY MEDICINE | Admitting: STUDENT IN AN ORGANIZED HEALTH CARE EDUCATION/TRAINING PROGRAM

## 2022-06-29 ENCOUNTER — APPOINTMENT (OUTPATIENT)
Dept: CARDIOLOGY | Facility: HOSPITAL | Age: 62
End: 2022-06-29

## 2022-06-29 DIAGNOSIS — I21.4 NSTEMI (NON-ST ELEVATED MYOCARDIAL INFARCTION): Primary | ICD-10-CM

## 2022-06-29 LAB
ACT BLD: 248 SECONDS (ref 82–152)
ALBUMIN SERPL-MCNC: 3.88 G/DL (ref 3.5–5.2)
ALBUMIN SERPL-MCNC: 4.47 G/DL (ref 3.5–5.2)
ALBUMIN/GLOB SERPL: 1.4 G/DL
ALBUMIN/GLOB SERPL: 1.6 G/DL
ALP SERPL-CCNC: 72 U/L (ref 39–117)
ALP SERPL-CCNC: 88 U/L (ref 39–117)
ALT SERPL W P-5'-P-CCNC: 13 U/L (ref 1–41)
ALT SERPL W P-5'-P-CCNC: 19 U/L (ref 1–41)
AMPHET+METHAMPHET UR QL: NEGATIVE
AMPHETAMINES UR QL: NEGATIVE
ANION GAP SERPL CALCULATED.3IONS-SCNC: 13.1 MMOL/L (ref 5–15)
ANION GAP SERPL CALCULATED.3IONS-SCNC: 9.4 MMOL/L (ref 5–15)
APTT PPP: 32 SECONDS (ref 26.5–34.5)
APTT PPP: 32.7 SECONDS (ref 26.5–34.5)
APTT PPP: 94.9 SECONDS (ref 26.5–34.5)
AST SERPL-CCNC: 26 U/L (ref 1–40)
AST SERPL-CCNC: 89 U/L (ref 1–40)
BARBITURATES UR QL SCN: NEGATIVE
BASOPHILS # BLD AUTO: 0.02 10*3/MM3 (ref 0–0.2)
BASOPHILS # BLD AUTO: 0.02 10*3/MM3 (ref 0–0.2)
BASOPHILS NFR BLD AUTO: 0.3 % (ref 0–1.5)
BASOPHILS NFR BLD AUTO: 0.3 % (ref 0–1.5)
BENZODIAZ UR QL SCN: NEGATIVE
BH CV ECHO MEAS - ACS: 2.3 CM
BH CV ECHO MEAS - AO MAX PG: 6.4 MMHG
BH CV ECHO MEAS - AO MEAN PG: 3 MMHG
BH CV ECHO MEAS - AO ROOT DIAM: 3.3 CM
BH CV ECHO MEAS - AO V2 MAX: 126 CM/SEC
BH CV ECHO MEAS - AO V2 VTI: 23.5 CM
BH CV ECHO MEAS - EDV(CUBED): 158.3 ML
BH CV ECHO MEAS - EDV(MOD-SP4): 148 ML
BH CV ECHO MEAS - EF(MOD-SP4): 63 %
BH CV ECHO MEAS - ESV(CUBED): 58.6 ML
BH CV ECHO MEAS - ESV(MOD-SP4): 54.8 ML
BH CV ECHO MEAS - FS: 28.2 %
BH CV ECHO MEAS - IVS/LVPW: 0.78 CM
BH CV ECHO MEAS - IVSD: 1.16 CM
BH CV ECHO MEAS - LA DIMENSION: 3.6 CM
BH CV ECHO MEAS - LAT PEAK E' VEL: 7.3 CM/SEC
BH CV ECHO MEAS - LV DIASTOLIC VOL/BSA (35-75): 59.3 CM2
BH CV ECHO MEAS - LV MASS(C)D: 301.3 GRAMS
BH CV ECHO MEAS - LV SYSTOLIC VOL/BSA (12-30): 21.9 CM2
BH CV ECHO MEAS - LVIDD: 5.4 CM
BH CV ECHO MEAS - LVIDS: 3.9 CM
BH CV ECHO MEAS - LVOT AREA: 3.8 CM2
BH CV ECHO MEAS - LVOT DIAM: 2.2 CM
BH CV ECHO MEAS - LVPWD: 1.48 CM
BH CV ECHO MEAS - MED PEAK E' VEL: 8.7 CM/SEC
BH CV ECHO MEAS - MV A MAX VEL: 72 CM/SEC
BH CV ECHO MEAS - MV E MAX VEL: 45 CM/SEC
BH CV ECHO MEAS - MV E/A: 0.63
BH CV ECHO MEAS - PA ACC TIME: 0.09 SEC
BH CV ECHO MEAS - PA PR(ACCEL): 40.8 MMHG
BH CV ECHO MEAS - SI(MOD-SP4): 37.3 ML/M2
BH CV ECHO MEAS - SV(MOD-SP4): 93.2 ML
BH CV ECHO MEASUREMENTS AVERAGE E/E' RATIO: 5.63
BILIRUB SERPL-MCNC: 0.7 MG/DL (ref 0–1.2)
BILIRUB SERPL-MCNC: 0.7 MG/DL (ref 0–1.2)
BUN SERPL-MCNC: 16 MG/DL (ref 8–23)
BUN SERPL-MCNC: 21 MG/DL (ref 8–23)
BUN/CREAT SERPL: 16.7 (ref 7–25)
BUN/CREAT SERPL: 22.1 (ref 7–25)
BUPRENORPHINE SERPL-MCNC: NEGATIVE NG/ML
CALCIUM SPEC-SCNC: 9 MG/DL (ref 8.6–10.5)
CALCIUM SPEC-SCNC: 9.4 MG/DL (ref 8.6–10.5)
CANNABINOIDS SERPL QL: NEGATIVE
CHLORIDE SERPL-SCNC: 104 MMOL/L (ref 98–107)
CHLORIDE SERPL-SCNC: 99 MMOL/L (ref 98–107)
CHOLEST SERPL-MCNC: 118 MG/DL (ref 0–200)
CK SERPL-CCNC: 336 U/L (ref 20–200)
CO2 SERPL-SCNC: 22.6 MMOL/L (ref 22–29)
CO2 SERPL-SCNC: 23.9 MMOL/L (ref 22–29)
COCAINE UR QL: NEGATIVE
CREAT SERPL-MCNC: 0.95 MG/DL (ref 0.76–1.27)
CREAT SERPL-MCNC: 0.96 MG/DL (ref 0.76–1.27)
DEPRECATED RDW RBC AUTO: 40.9 FL (ref 37–54)
DEPRECATED RDW RBC AUTO: 42.5 FL (ref 37–54)
EGFRCR SERPLBLD CKD-EPI 2021: 88.8 ML/MIN/1.73
EGFRCR SERPLBLD CKD-EPI 2021: 89.9 ML/MIN/1.73
EOSINOPHIL # BLD AUTO: 0.09 10*3/MM3 (ref 0–0.4)
EOSINOPHIL # BLD AUTO: 0.13 10*3/MM3 (ref 0–0.4)
EOSINOPHIL NFR BLD AUTO: 1.3 % (ref 0.3–6.2)
EOSINOPHIL NFR BLD AUTO: 1.8 % (ref 0.3–6.2)
ERYTHROCYTE [DISTWIDTH] IN BLOOD BY AUTOMATED COUNT: 13.2 % (ref 12.3–15.4)
ERYTHROCYTE [DISTWIDTH] IN BLOOD BY AUTOMATED COUNT: 13.3 % (ref 12.3–15.4)
FLUAV RNA RESP QL NAA+PROBE: NOT DETECTED
FLUBV RNA RESP QL NAA+PROBE: NOT DETECTED
GLOBULIN UR ELPH-MCNC: 2.8 GM/DL
GLOBULIN UR ELPH-MCNC: 2.8 GM/DL
GLUCOSE SERPL-MCNC: 117 MG/DL (ref 65–99)
GLUCOSE SERPL-MCNC: 93 MG/DL (ref 65–99)
HBA1C MFR BLD: 5.5 % (ref 4.8–5.6)
HCT VFR BLD AUTO: 37.9 % (ref 37.5–51)
HCT VFR BLD AUTO: 41.9 % (ref 37.5–51)
HDLC SERPL-MCNC: 50 MG/DL (ref 40–60)
HGB BLD-MCNC: 12.4 G/DL (ref 13–17.7)
HGB BLD-MCNC: 14 G/DL (ref 13–17.7)
HOLD SPECIMEN: NORMAL
HOLD SPECIMEN: NORMAL
IMM GRANULOCYTES # BLD AUTO: 0.01 10*3/MM3 (ref 0–0.05)
IMM GRANULOCYTES # BLD AUTO: 0.01 10*3/MM3 (ref 0–0.05)
IMM GRANULOCYTES NFR BLD AUTO: 0.1 % (ref 0–0.5)
IMM GRANULOCYTES NFR BLD AUTO: 0.1 % (ref 0–0.5)
INR PPP: 1.02 (ref 0.9–1.1)
LDLC SERPL CALC-MCNC: 55 MG/DL (ref 0–100)
LDLC/HDLC SERPL: 1.12 {RATIO}
LEFT ATRIUM VOLUME INDEX: 15.2 ML/M2
LYMPHOCYTES # BLD AUTO: 1.64 10*3/MM3 (ref 0.7–3.1)
LYMPHOCYTES # BLD AUTO: 2.34 10*3/MM3 (ref 0.7–3.1)
LYMPHOCYTES NFR BLD AUTO: 23.6 % (ref 19.6–45.3)
LYMPHOCYTES NFR BLD AUTO: 33.1 % (ref 19.6–45.3)
MAGNESIUM SERPL-MCNC: 1.9 MG/DL (ref 1.6–2.4)
MAGNESIUM SERPL-MCNC: 2.1 MG/DL (ref 1.6–2.4)
MAXIMAL PREDICTED HEART RATE: 157 BPM
MCH RBC QN AUTO: 28.6 PG (ref 26.6–33)
MCH RBC QN AUTO: 28.6 PG (ref 26.6–33)
MCHC RBC AUTO-ENTMCNC: 32.7 G/DL (ref 31.5–35.7)
MCHC RBC AUTO-ENTMCNC: 33.4 G/DL (ref 31.5–35.7)
MCV RBC AUTO: 85.5 FL (ref 79–97)
MCV RBC AUTO: 87.3 FL (ref 79–97)
METHADONE UR QL SCN: NEGATIVE
MONOCYTES # BLD AUTO: 0.61 10*3/MM3 (ref 0.1–0.9)
MONOCYTES # BLD AUTO: 0.64 10*3/MM3 (ref 0.1–0.9)
MONOCYTES NFR BLD AUTO: 8.6 % (ref 5–12)
MONOCYTES NFR BLD AUTO: 9.2 % (ref 5–12)
NEUTROPHILS NFR BLD AUTO: 3.97 10*3/MM3 (ref 1.7–7)
NEUTROPHILS NFR BLD AUTO: 4.54 10*3/MM3 (ref 1.7–7)
NEUTROPHILS NFR BLD AUTO: 56.1 % (ref 42.7–76)
NEUTROPHILS NFR BLD AUTO: 65.5 % (ref 42.7–76)
NRBC BLD AUTO-RTO: 0 /100 WBC (ref 0–0.2)
NRBC BLD AUTO-RTO: 0 /100 WBC (ref 0–0.2)
NT-PROBNP SERPL-MCNC: 1081 PG/ML (ref 0–900)
OPIATES UR QL: POSITIVE
OXYCODONE UR QL SCN: NEGATIVE
PCP UR QL SCN: NEGATIVE
PLATELET # BLD AUTO: 177 10*3/MM3 (ref 140–450)
PLATELET # BLD AUTO: 213 10*3/MM3 (ref 140–450)
PMV BLD AUTO: 8.6 FL (ref 6–12)
PMV BLD AUTO: 9.1 FL (ref 6–12)
POTASSIUM SERPL-SCNC: 3.3 MMOL/L (ref 3.5–5.2)
POTASSIUM SERPL-SCNC: 4.5 MMOL/L (ref 3.5–5.2)
POTASSIUM SERPL-SCNC: 4.7 MMOL/L (ref 3.5–5.2)
PROPOXYPH UR QL: NEGATIVE
PROT SERPL-MCNC: 6.7 G/DL (ref 6–8.5)
PROT SERPL-MCNC: 7.3 G/DL (ref 6–8.5)
PROTHROMBIN TIME: 13.6 SECONDS (ref 12.1–14.7)
QT INTERVAL: 408 MS
QT INTERVAL: 454 MS
QTC INTERVAL: 464 MS
QTC INTERVAL: 465 MS
RBC # BLD AUTO: 4.34 10*6/MM3 (ref 4.14–5.8)
RBC # BLD AUTO: 4.9 10*6/MM3 (ref 4.14–5.8)
SARS-COV-2 RNA RESP QL NAA+PROBE: NOT DETECTED
SODIUM SERPL-SCNC: 136 MMOL/L (ref 136–145)
SODIUM SERPL-SCNC: 136 MMOL/L (ref 136–145)
STRESS TARGET HR: 133 BPM
T4 FREE SERPL-MCNC: 1.35 NG/DL (ref 0.93–1.7)
TRICYCLICS UR QL SCN: NEGATIVE
TRIGL SERPL-MCNC: 59 MG/DL (ref 0–150)
TROPONIN T SERPL-MCNC: 0.27 NG/ML (ref 0–0.03)
TROPONIN T SERPL-MCNC: 0.29 NG/ML (ref 0–0.03)
TROPONIN T SERPL-MCNC: 0.96 NG/ML (ref 0–0.03)
TSH SERPL DL<=0.05 MIU/L-ACNC: 4.54 UIU/ML (ref 0.27–4.2)
VLDLC SERPL-MCNC: 13 MG/DL (ref 5–40)
WBC NRBC COR # BLD: 6.94 10*3/MM3 (ref 3.4–10.8)
WBC NRBC COR # BLD: 7.08 10*3/MM3 (ref 3.4–10.8)
WHOLE BLOOD HOLD COAG: NORMAL
WHOLE BLOOD HOLD SPECIMEN: NORMAL

## 2022-06-29 PROCEDURE — 85347 COAGULATION TIME ACTIVATED: CPT

## 2022-06-29 PROCEDURE — B240ZZ3 ULTRASONOGRAPHY OF SINGLE CORONARY ARTERY, INTRAVASCULAR: ICD-10-PCS | Performed by: INTERNAL MEDICINE

## 2022-06-29 PROCEDURE — 4A023N7 MEASUREMENT OF CARDIAC SAMPLING AND PRESSURE, LEFT HEART, PERCUTANEOUS APPROACH: ICD-10-PCS | Performed by: INTERNAL MEDICINE

## 2022-06-29 PROCEDURE — 82550 ASSAY OF CK (CPK): CPT | Performed by: PHYSICIAN ASSISTANT

## 2022-06-29 PROCEDURE — 94761 N-INVAS EAR/PLS OXIMETRY MLT: CPT

## 2022-06-29 PROCEDURE — 92941 PRQ TRLML REVSC TOT OCCL AMI: CPT | Performed by: INTERNAL MEDICINE

## 2022-06-29 PROCEDURE — 25010000002 MORPHINE PER 10 MG: Performed by: EMERGENCY MEDICINE

## 2022-06-29 PROCEDURE — 85025 COMPLETE CBC W/AUTO DIFF WBC: CPT | Performed by: INTERNAL MEDICINE

## 2022-06-29 PROCEDURE — 71046 X-RAY EXAM CHEST 2 VIEWS: CPT

## 2022-06-29 PROCEDURE — 92978 ENDOLUMINL IVUS OCT C 1ST: CPT | Performed by: INTERNAL MEDICINE

## 2022-06-29 PROCEDURE — 0 IOPAMIDOL PER 1 ML: Performed by: INTERNAL MEDICINE

## 2022-06-29 PROCEDURE — C9600 PERC DRUG-EL COR STENT SING: HCPCS | Performed by: INTERNAL MEDICINE

## 2022-06-29 PROCEDURE — 83036 HEMOGLOBIN GLYCOSYLATED A1C: CPT | Performed by: INTERNAL MEDICINE

## 2022-06-29 PROCEDURE — 85730 THROMBOPLASTIN TIME PARTIAL: CPT | Performed by: STUDENT IN AN ORGANIZED HEALTH CARE EDUCATION/TRAINING PROGRAM

## 2022-06-29 PROCEDURE — C1887 CATHETER, GUIDING: HCPCS | Performed by: INTERNAL MEDICINE

## 2022-06-29 PROCEDURE — 85730 THROMBOPLASTIN TIME PARTIAL: CPT | Performed by: PHYSICIAN ASSISTANT

## 2022-06-29 PROCEDURE — C1753 CATH, INTRAVAS ULTRASOUND: HCPCS | Performed by: INTERNAL MEDICINE

## 2022-06-29 PROCEDURE — 25010000002 HEPARIN (PORCINE) PER 1000 UNITS: Performed by: PHYSICIAN ASSISTANT

## 2022-06-29 PROCEDURE — 94799 UNLISTED PULMONARY SVC/PX: CPT

## 2022-06-29 PROCEDURE — 84484 ASSAY OF TROPONIN QUANT: CPT

## 2022-06-29 PROCEDURE — 84443 ASSAY THYROID STIM HORMONE: CPT | Performed by: PHYSICIAN ASSISTANT

## 2022-06-29 PROCEDURE — 80053 COMPREHEN METABOLIC PANEL: CPT | Performed by: INTERNAL MEDICINE

## 2022-06-29 PROCEDURE — C1725 CATH, TRANSLUMIN NON-LASER: HCPCS | Performed by: INTERNAL MEDICINE

## 2022-06-29 PROCEDURE — 93010 ELECTROCARDIOGRAM REPORT: CPT | Performed by: INTERNAL MEDICINE

## 2022-06-29 PROCEDURE — 87636 SARSCOV2 & INF A&B AMP PRB: CPT | Performed by: PHYSICIAN ASSISTANT

## 2022-06-29 PROCEDURE — 80053 COMPREHEN METABOLIC PANEL: CPT

## 2022-06-29 PROCEDURE — 85610 PROTHROMBIN TIME: CPT | Performed by: PHYSICIAN ASSISTANT

## 2022-06-29 PROCEDURE — 99222 1ST HOSP IP/OBS MODERATE 55: CPT | Performed by: INTERNAL MEDICINE

## 2022-06-29 PROCEDURE — 25010000002 MIDAZOLAM PER 1 MG: Performed by: INTERNAL MEDICINE

## 2022-06-29 PROCEDURE — 25010000002 MAGNESIUM SULFATE IN D5W 1G/100ML (PREMIX) 1-5 GM/100ML-% SOLUTION: Performed by: INTERNAL MEDICINE

## 2022-06-29 PROCEDURE — 99223 1ST HOSP IP/OBS HIGH 75: CPT | Performed by: PHYSICIAN ASSISTANT

## 2022-06-29 PROCEDURE — 25010000002 HEPARIN (PORCINE) PER 1000 UNITS: Performed by: INTERNAL MEDICINE

## 2022-06-29 PROCEDURE — 027034Z DILATION OF CORONARY ARTERY, ONE ARTERY WITH DRUG-ELUTING INTRALUMINAL DEVICE, PERCUTANEOUS APPROACH: ICD-10-PCS | Performed by: INTERNAL MEDICINE

## 2022-06-29 PROCEDURE — B2111ZZ FLUOROSCOPY OF MULTIPLE CORONARY ARTERIES USING LOW OSMOLAR CONTRAST: ICD-10-PCS | Performed by: INTERNAL MEDICINE

## 2022-06-29 PROCEDURE — B2151ZZ FLUOROSCOPY OF LEFT HEART USING LOW OSMOLAR CONTRAST: ICD-10-PCS | Performed by: INTERNAL MEDICINE

## 2022-06-29 PROCEDURE — C1757 CATH, THROMBECTOMY/EMBOLECT: HCPCS | Performed by: INTERNAL MEDICINE

## 2022-06-29 PROCEDURE — 85730 THROMBOPLASTIN TIME PARTIAL: CPT | Performed by: INTERNAL MEDICINE

## 2022-06-29 PROCEDURE — 99284 EMERGENCY DEPT VISIT MOD MDM: CPT

## 2022-06-29 PROCEDURE — 84439 ASSAY OF FREE THYROXINE: CPT | Performed by: INTERNAL MEDICINE

## 2022-06-29 PROCEDURE — 36415 COLL VENOUS BLD VENIPUNCTURE: CPT

## 2022-06-29 PROCEDURE — 83735 ASSAY OF MAGNESIUM: CPT | Performed by: PHYSICIAN ASSISTANT

## 2022-06-29 PROCEDURE — 93458 L HRT ARTERY/VENTRICLE ANGIO: CPT | Performed by: INTERNAL MEDICINE

## 2022-06-29 PROCEDURE — C1894 INTRO/SHEATH, NON-LASER: HCPCS | Performed by: INTERNAL MEDICINE

## 2022-06-29 PROCEDURE — C1874 STENT, COATED/COV W/DEL SYS: HCPCS | Performed by: INTERNAL MEDICINE

## 2022-06-29 PROCEDURE — 83735 ASSAY OF MAGNESIUM: CPT | Performed by: STUDENT IN AN ORGANIZED HEALTH CARE EDUCATION/TRAINING PROGRAM

## 2022-06-29 PROCEDURE — 93005 ELECTROCARDIOGRAM TRACING: CPT | Performed by: EMERGENCY MEDICINE

## 2022-06-29 PROCEDURE — 84132 ASSAY OF SERUM POTASSIUM: CPT | Performed by: STUDENT IN AN ORGANIZED HEALTH CARE EDUCATION/TRAINING PROGRAM

## 2022-06-29 PROCEDURE — 84481 FREE ASSAY (FT-3): CPT | Performed by: INTERNAL MEDICINE

## 2022-06-29 PROCEDURE — 93005 ELECTROCARDIOGRAM TRACING: CPT | Performed by: STUDENT IN AN ORGANIZED HEALTH CARE EDUCATION/TRAINING PROGRAM

## 2022-06-29 PROCEDURE — 84484 ASSAY OF TROPONIN QUANT: CPT | Performed by: STUDENT IN AN ORGANIZED HEALTH CARE EDUCATION/TRAINING PROGRAM

## 2022-06-29 PROCEDURE — 85025 COMPLETE CBC W/AUTO DIFF WBC: CPT

## 2022-06-29 PROCEDURE — 93306 TTE W/DOPPLER COMPLETE: CPT

## 2022-06-29 PROCEDURE — 02C03ZZ EXTIRPATION OF MATTER FROM CORONARY ARTERY, ONE ARTERY, PERCUTANEOUS APPROACH: ICD-10-PCS | Performed by: INTERNAL MEDICINE

## 2022-06-29 PROCEDURE — 80306 DRUG TEST PRSMV INSTRMNT: CPT | Performed by: PHYSICIAN ASSISTANT

## 2022-06-29 PROCEDURE — 84484 ASSAY OF TROPONIN QUANT: CPT | Performed by: EMERGENCY MEDICINE

## 2022-06-29 PROCEDURE — 25010000002 FENTANYL CITRATE (PF) 50 MCG/ML SOLUTION: Performed by: INTERNAL MEDICINE

## 2022-06-29 PROCEDURE — 25010000002 EPTIFIBATIDE 20 MG/10ML SOLUTION: Performed by: INTERNAL MEDICINE

## 2022-06-29 PROCEDURE — 93306 TTE W/DOPPLER COMPLETE: CPT | Performed by: INTERNAL MEDICINE

## 2022-06-29 PROCEDURE — C1769 GUIDE WIRE: HCPCS | Performed by: INTERNAL MEDICINE

## 2022-06-29 PROCEDURE — 83880 ASSAY OF NATRIURETIC PEPTIDE: CPT | Performed by: PHYSICIAN ASSISTANT

## 2022-06-29 PROCEDURE — 80061 LIPID PANEL: CPT | Performed by: NURSE PRACTITIONER

## 2022-06-29 DEVICE — XIENCE SKYPOINT™ EVEROLIMUS ELUTING CORONARY STENT SYSTEM 3.50 MM X 18 MM / RAPID-EXCHANGE
Type: IMPLANTABLE DEVICE | Status: FUNCTIONAL
Brand: XIENCE SKYPOINT™

## 2022-06-29 RX ORDER — LOSARTAN POTASSIUM 50 MG/1
50 TABLET ORAL DAILY
Status: DISCONTINUED | OUTPATIENT
Start: 2022-06-29 | End: 2022-06-30 | Stop reason: HOSPADM

## 2022-06-29 RX ORDER — HYDROXYZINE HYDROCHLORIDE 25 MG/1
25 TABLET, FILM COATED ORAL 3 TIMES DAILY PRN
Status: DISCONTINUED | OUTPATIENT
Start: 2022-06-29 | End: 2022-06-30 | Stop reason: HOSPADM

## 2022-06-29 RX ORDER — MAGNESIUM SULFATE HEPTAHYDRATE 40 MG/ML
2 INJECTION, SOLUTION INTRAVENOUS AS NEEDED
Status: DISCONTINUED | OUTPATIENT
Start: 2022-06-29 | End: 2022-06-30 | Stop reason: HOSPADM

## 2022-06-29 RX ORDER — CYCLOBENZAPRINE HCL 10 MG
10 TABLET ORAL 2 TIMES DAILY PRN
COMMUNITY

## 2022-06-29 RX ORDER — HEPARIN SODIUM 1000 [USP'U]/ML
INJECTION, SOLUTION INTRAVENOUS; SUBCUTANEOUS AS NEEDED
Status: DISCONTINUED | OUTPATIENT
Start: 2022-06-29 | End: 2022-06-29 | Stop reason: HOSPADM

## 2022-06-29 RX ORDER — IBUPROFEN 800 MG/1
800 TABLET ORAL 2 TIMES DAILY PRN
COMMUNITY
End: 2022-06-30 | Stop reason: HOSPADM

## 2022-06-29 RX ORDER — CHOLECALCIFEROL (VITAMIN D3) 125 MCG
10 CAPSULE ORAL NIGHTLY PRN
Status: DISCONTINUED | OUTPATIENT
Start: 2022-06-29 | End: 2022-06-30 | Stop reason: HOSPADM

## 2022-06-29 RX ORDER — MAGNESIUM SULFATE HEPTAHYDRATE 40 MG/ML
4 INJECTION, SOLUTION INTRAVENOUS AS NEEDED
Status: CANCELLED | OUTPATIENT
Start: 2022-06-29

## 2022-06-29 RX ORDER — PANTOPRAZOLE SODIUM 40 MG/1
40 TABLET, DELAYED RELEASE ORAL
Status: DISCONTINUED | OUTPATIENT
Start: 2022-06-29 | End: 2022-06-30 | Stop reason: HOSPADM

## 2022-06-29 RX ORDER — CYCLOBENZAPRINE HCL 10 MG
10 TABLET ORAL 3 TIMES DAILY PRN
Status: DISCONTINUED | OUTPATIENT
Start: 2022-06-29 | End: 2022-06-30 | Stop reason: HOSPADM

## 2022-06-29 RX ORDER — SODIUM CHLORIDE 9 MG/ML
INJECTION, SOLUTION INTRAVENOUS CONTINUOUS PRN
Status: COMPLETED | OUTPATIENT
Start: 2022-06-29 | End: 2022-06-29

## 2022-06-29 RX ORDER — MAGNESIUM SULFATE HEPTAHYDRATE 40 MG/ML
2 INJECTION, SOLUTION INTRAVENOUS AS NEEDED
Status: CANCELLED | OUTPATIENT
Start: 2022-06-29

## 2022-06-29 RX ORDER — FENTANYL CITRATE 50 UG/ML
INJECTION, SOLUTION INTRAMUSCULAR; INTRAVENOUS AS NEEDED
Status: DISCONTINUED | OUTPATIENT
Start: 2022-06-29 | End: 2022-06-29 | Stop reason: HOSPADM

## 2022-06-29 RX ORDER — POTASSIUM CHLORIDE 1.5 G/1.77G
40 POWDER, FOR SOLUTION ORAL AS NEEDED
Status: CANCELLED | OUTPATIENT
Start: 2022-06-29

## 2022-06-29 RX ORDER — SODIUM CHLORIDE 0.9 % (FLUSH) 0.9 %
10 SYRINGE (ML) INJECTION AS NEEDED
Status: DISCONTINUED | OUTPATIENT
Start: 2022-06-29 | End: 2022-06-30 | Stop reason: HOSPADM

## 2022-06-29 RX ORDER — POTASSIUM CHLORIDE 1.5 G/1.77G
40 POWDER, FOR SOLUTION ORAL AS NEEDED
Status: DISCONTINUED | OUTPATIENT
Start: 2022-06-29 | End: 2022-06-30 | Stop reason: HOSPADM

## 2022-06-29 RX ORDER — VERAPAMIL HYDROCHLORIDE 2.5 MG/ML
INJECTION, SOLUTION INTRAVENOUS AS NEEDED
Status: DISCONTINUED | OUTPATIENT
Start: 2022-06-29 | End: 2022-06-29 | Stop reason: HOSPADM

## 2022-06-29 RX ORDER — EPTIFIBATIDE 20 MG/10ML
INJECTION INTRAVENOUS AS NEEDED
Status: DISCONTINUED | OUTPATIENT
Start: 2022-06-29 | End: 2022-06-29 | Stop reason: HOSPADM

## 2022-06-29 RX ORDER — ACETAMINOPHEN 325 MG/1
650 TABLET ORAL EVERY 6 HOURS PRN
Status: DISCONTINUED | OUTPATIENT
Start: 2022-06-29 | End: 2022-06-29 | Stop reason: SDUPTHER

## 2022-06-29 RX ORDER — GABAPENTIN 300 MG/1
600 CAPSULE ORAL 2 TIMES DAILY
Status: DISCONTINUED | OUTPATIENT
Start: 2022-06-29 | End: 2022-06-30 | Stop reason: HOSPADM

## 2022-06-29 RX ORDER — LIDOCAINE HYDROCHLORIDE 20 MG/ML
INJECTION, SOLUTION INFILTRATION; PERINEURAL AS NEEDED
Status: DISCONTINUED | OUTPATIENT
Start: 2022-06-29 | End: 2022-06-29 | Stop reason: HOSPADM

## 2022-06-29 RX ORDER — ATORVASTATIN CALCIUM 40 MG/1
40 TABLET, FILM COATED ORAL NIGHTLY
Status: DISCONTINUED | OUTPATIENT
Start: 2022-06-29 | End: 2022-06-30 | Stop reason: HOSPADM

## 2022-06-29 RX ORDER — SODIUM CHLORIDE 9 MG/ML
100 INJECTION, SOLUTION INTRAVENOUS CONTINUOUS
Status: DISCONTINUED | OUTPATIENT
Start: 2022-06-29 | End: 2022-06-30 | Stop reason: HOSPADM

## 2022-06-29 RX ORDER — LOSARTAN POTASSIUM 50 MG/1
50 TABLET ORAL DAILY
COMMUNITY

## 2022-06-29 RX ORDER — POTASSIUM CHLORIDE 1500 MG/1
40 TABLET, FILM COATED, EXTENDED RELEASE ORAL AS NEEDED
Status: DISCONTINUED | OUTPATIENT
Start: 2022-06-29 | End: 2022-06-30 | Stop reason: HOSPADM

## 2022-06-29 RX ORDER — ASPIRIN 81 MG/1
81 TABLET ORAL DAILY
Status: DISCONTINUED | OUTPATIENT
Start: 2022-06-29 | End: 2022-06-30 | Stop reason: HOSPADM

## 2022-06-29 RX ORDER — IBUPROFEN 800 MG/1
800 TABLET ORAL 2 TIMES DAILY PRN
Status: CANCELLED | OUTPATIENT
Start: 2022-06-29

## 2022-06-29 RX ORDER — ASPIRIN 325 MG
325 TABLET ORAL ONCE
Status: COMPLETED | OUTPATIENT
Start: 2022-06-29 | End: 2022-06-29

## 2022-06-29 RX ORDER — HEPARIN SODIUM 5000 [USP'U]/ML
5000 INJECTION, SOLUTION INTRAVENOUS; SUBCUTANEOUS ONCE
Status: COMPLETED | OUTPATIENT
Start: 2022-06-29 | End: 2022-06-29

## 2022-06-29 RX ORDER — HEPARIN SOD,PORCINE/0.9 % NACL 25000/250
7.58 INTRAVENOUS SOLUTION INTRAVENOUS
Status: DISCONTINUED | OUTPATIENT
Start: 2022-06-29 | End: 2022-06-30

## 2022-06-29 RX ORDER — MIDAZOLAM HYDROCHLORIDE 1 MG/ML
INJECTION INTRAMUSCULAR; INTRAVENOUS AS NEEDED
Status: DISCONTINUED | OUTPATIENT
Start: 2022-06-29 | End: 2022-06-29 | Stop reason: HOSPADM

## 2022-06-29 RX ORDER — TRAMADOL HYDROCHLORIDE 50 MG/1
50 TABLET ORAL 2 TIMES DAILY PRN
Status: DISCONTINUED | OUTPATIENT
Start: 2022-06-29 | End: 2022-06-30 | Stop reason: HOSPADM

## 2022-06-29 RX ORDER — POTASSIUM CHLORIDE 7.45 MG/ML
10 INJECTION INTRAVENOUS
Status: DISCONTINUED | OUTPATIENT
Start: 2022-06-29 | End: 2022-06-30 | Stop reason: HOSPADM

## 2022-06-29 RX ORDER — POTASSIUM CHLORIDE 1500 MG/1
40 TABLET, FILM COATED, EXTENDED RELEASE ORAL EVERY 4 HOURS
Status: COMPLETED | OUTPATIENT
Start: 2022-06-29 | End: 2022-06-29

## 2022-06-29 RX ORDER — LIDOCAINE 50 MG/G
2 PATCH TOPICAL
Status: DISCONTINUED | OUTPATIENT
Start: 2022-06-29 | End: 2022-06-30 | Stop reason: HOSPADM

## 2022-06-29 RX ORDER — MORPHINE SULFATE 2 MG/ML
2 INJECTION, SOLUTION INTRAMUSCULAR; INTRAVENOUS ONCE
Status: COMPLETED | OUTPATIENT
Start: 2022-06-29 | End: 2022-06-29

## 2022-06-29 RX ORDER — MAGNESIUM SULFATE 1 G/100ML
1 INJECTION INTRAVENOUS ONCE
Status: COMPLETED | OUTPATIENT
Start: 2022-06-29 | End: 2022-06-29

## 2022-06-29 RX ORDER — CALCIUM CARBONATE 200(500)MG
2 TABLET,CHEWABLE ORAL 3 TIMES DAILY PRN
Status: DISCONTINUED | OUTPATIENT
Start: 2022-06-29 | End: 2022-06-30 | Stop reason: HOSPADM

## 2022-06-29 RX ORDER — POTASSIUM CHLORIDE 750 MG/1
40 CAPSULE, EXTENDED RELEASE ORAL AS NEEDED
Status: CANCELLED | OUTPATIENT
Start: 2022-06-29

## 2022-06-29 RX ORDER — MAGNESIUM SULFATE 1 G/100ML
1 INJECTION INTRAVENOUS AS NEEDED
Status: DISCONTINUED | OUTPATIENT
Start: 2022-06-29 | End: 2022-06-30 | Stop reason: HOSPADM

## 2022-06-29 RX ORDER — MELOXICAM 7.5 MG/1
15 TABLET ORAL DAILY
Status: DISCONTINUED | OUTPATIENT
Start: 2022-06-29 | End: 2022-06-30 | Stop reason: HOSPADM

## 2022-06-29 RX ORDER — METOPROLOL TARTRATE 5 MG/5ML
5 INJECTION INTRAVENOUS ONCE
Status: DISCONTINUED | OUTPATIENT
Start: 2022-06-29 | End: 2022-06-29

## 2022-06-29 RX ORDER — TRAMADOL HYDROCHLORIDE 50 MG/1
50 TABLET ORAL 2 TIMES DAILY PRN
COMMUNITY

## 2022-06-29 RX ORDER — HYDROCODONE BITARTRATE AND ACETAMINOPHEN 7.5; 325 MG/1; MG/1
1 TABLET ORAL 4 TIMES DAILY PRN
Status: DISCONTINUED | OUTPATIENT
Start: 2022-06-29 | End: 2022-06-30 | Stop reason: HOSPADM

## 2022-06-29 RX ORDER — ACETAMINOPHEN 325 MG/1
650 TABLET ORAL EVERY 4 HOURS PRN
Status: DISCONTINUED | OUTPATIENT
Start: 2022-06-29 | End: 2022-06-30 | Stop reason: HOSPADM

## 2022-06-29 RX ADMIN — METOPROLOL TARTRATE 12.5 MG: 25 TABLET, FILM COATED ORAL at 10:22

## 2022-06-29 RX ADMIN — HEPARIN SODIUM 5000 UNITS: 5000 INJECTION INTRAVENOUS; SUBCUTANEOUS at 06:35

## 2022-06-29 RX ADMIN — GABAPENTIN 600 MG: 300 CAPSULE ORAL at 20:02

## 2022-06-29 RX ADMIN — MAGNESIUM SULFATE HEPTAHYDRATE 1 G: 1 INJECTION, SOLUTION INTRAVENOUS at 14:46

## 2022-06-29 RX ADMIN — NITROGLYCERIN 1 INCH: 20 OINTMENT TOPICAL at 05:36

## 2022-06-29 RX ADMIN — MORPHINE SULFATE 2 MG: 2 INJECTION, SOLUTION INTRAMUSCULAR; INTRAVENOUS at 05:36

## 2022-06-29 RX ADMIN — METOPROLOL TARTRATE 12.5 MG: 25 TABLET, FILM COATED ORAL at 20:02

## 2022-06-29 RX ADMIN — LIDOCAINE 2 PATCH: 50 PATCH CUTANEOUS at 14:11

## 2022-06-29 RX ADMIN — SODIUM CHLORIDE 100 ML/HR: 9 INJECTION, SOLUTION INTRAVENOUS at 14:15

## 2022-06-29 RX ADMIN — TICAGRELOR 90 MG: 90 TABLET ORAL at 20:02

## 2022-06-29 RX ADMIN — ASPIRIN 81 MG: 81 TABLET, COATED ORAL at 10:22

## 2022-06-29 RX ADMIN — SODIUM CHLORIDE 100 ML/HR: 9 INJECTION, SOLUTION INTRAVENOUS at 23:12

## 2022-06-29 RX ADMIN — ASPIRIN 325 MG: 325 TABLET ORAL at 04:55

## 2022-06-29 RX ADMIN — HEPARIN SODIUM 5.58 UNITS/KG/HR: 5000 INJECTION INTRAVENOUS; SUBCUTANEOUS at 16:51

## 2022-06-29 RX ADMIN — HEPARIN SODIUM 7.58 UNITS/KG/HR: 5000 INJECTION INTRAVENOUS; SUBCUTANEOUS at 06:35

## 2022-06-29 RX ADMIN — POTASSIUM CHLORIDE 40 MEQ: 20 TABLET, EXTENDED RELEASE ORAL at 14:13

## 2022-06-29 RX ADMIN — SODIUM CHLORIDE 500 ML: 9 INJECTION, SOLUTION INTRAVENOUS at 05:35

## 2022-06-29 RX ADMIN — PANTOPRAZOLE SODIUM 40 MG: 40 TABLET, DELAYED RELEASE ORAL at 14:13

## 2022-06-29 RX ADMIN — GABAPENTIN 600 MG: 300 CAPSULE ORAL at 14:14

## 2022-06-29 RX ADMIN — ATORVASTATIN CALCIUM 40 MG: 40 TABLET, FILM COATED ORAL at 20:03

## 2022-06-29 RX ADMIN — POTASSIUM CHLORIDE 40 MEQ: 20 TABLET, EXTENDED RELEASE ORAL at 16:49

## 2022-06-29 RX ADMIN — MELOXICAM 15 MG: 7.5 TABLET ORAL at 14:46

## 2022-06-30 VITALS
SYSTOLIC BLOOD PRESSURE: 110 MMHG | TEMPERATURE: 98 F | DIASTOLIC BLOOD PRESSURE: 64 MMHG | OXYGEN SATURATION: 98 % | RESPIRATION RATE: 18 BRPM | HEART RATE: 74 BPM | WEIGHT: 284.1 LBS | BODY MASS INDEX: 37.65 KG/M2 | HEIGHT: 73 IN

## 2022-06-30 DIAGNOSIS — Z95.5 STENTED CORONARY ARTERY: ICD-10-CM

## 2022-06-30 DIAGNOSIS — I21.4 NSTEMI (NON-ST ELEVATED MYOCARDIAL INFARCTION): Primary | ICD-10-CM

## 2022-06-30 LAB
APTT PPP: 76.4 SECONDS (ref 26.5–34.5)
QT INTERVAL: 436 MS
QTC INTERVAL: 457 MS
T3FREE SERPL-MCNC: 3.16 PG/ML (ref 2–4.4)

## 2022-06-30 PROCEDURE — 25010000002 HEPARIN (PORCINE) PER 1000 UNITS: Performed by: PHYSICIAN ASSISTANT

## 2022-06-30 PROCEDURE — 93010 ELECTROCARDIOGRAM REPORT: CPT | Performed by: INTERNAL MEDICINE

## 2022-06-30 PROCEDURE — 25010000002 HEPARIN (PORCINE) PER 1000 UNITS: Performed by: INTERNAL MEDICINE

## 2022-06-30 PROCEDURE — 93005 ELECTROCARDIOGRAM TRACING: CPT | Performed by: INTERNAL MEDICINE

## 2022-06-30 PROCEDURE — 85730 THROMBOPLASTIN TIME PARTIAL: CPT | Performed by: STUDENT IN AN ORGANIZED HEALTH CARE EDUCATION/TRAINING PROGRAM

## 2022-06-30 PROCEDURE — 25010000002 HEPARIN (PORCINE) PER 1000 UNITS

## 2022-06-30 PROCEDURE — 99239 HOSP IP/OBS DSCHRG MGMT >30: CPT | Performed by: PHYSICIAN ASSISTANT

## 2022-06-30 RX ORDER — ASPIRIN 81 MG/1
81 TABLET ORAL DAILY
Qty: 90 TABLET | Refills: 0 | Status: SHIPPED | OUTPATIENT
Start: 2022-06-30

## 2022-06-30 RX ORDER — HEPARIN SODIUM 5000 [USP'U]/ML
INJECTION, SOLUTION INTRAVENOUS; SUBCUTANEOUS
Status: DISCONTINUED
Start: 2022-06-30 | End: 2022-06-30 | Stop reason: HOSPADM

## 2022-06-30 RX ORDER — HEPARIN SODIUM 5000 [USP'U]/ML
5000 INJECTION, SOLUTION INTRAVENOUS; SUBCUTANEOUS AS NEEDED
Status: DISCONTINUED | OUTPATIENT
Start: 2022-06-30 | End: 2022-06-30

## 2022-06-30 RX ORDER — ATORVASTATIN CALCIUM 40 MG/1
40 TABLET, FILM COATED ORAL NIGHTLY
Qty: 90 TABLET | Refills: 0 | Status: SHIPPED | OUTPATIENT
Start: 2022-06-30 | End: 2022-09-09 | Stop reason: SDUPTHER

## 2022-06-30 RX ORDER — HEPARIN SODIUM 5000 [USP'U]/ML
2500 INJECTION, SOLUTION INTRAVENOUS; SUBCUTANEOUS AS NEEDED
Status: DISCONTINUED | OUTPATIENT
Start: 2022-06-30 | End: 2022-06-30

## 2022-06-30 RX ADMIN — METOPROLOL TARTRATE 12.5 MG: 25 TABLET, FILM COATED ORAL at 08:28

## 2022-06-30 RX ADMIN — TICAGRELOR 90 MG: 90 TABLET ORAL at 08:28

## 2022-06-30 RX ADMIN — HEPARIN SODIUM 9.58 UNITS/KG/HR: 5000 INJECTION INTRAVENOUS; SUBCUTANEOUS at 00:12

## 2022-06-30 RX ADMIN — LIDOCAINE 2 PATCH: 50 PATCH CUTANEOUS at 08:29

## 2022-06-30 RX ADMIN — MELOXICAM 15 MG: 7.5 TABLET ORAL at 08:28

## 2022-06-30 RX ADMIN — PANTOPRAZOLE SODIUM 40 MG: 40 TABLET, DELAYED RELEASE ORAL at 05:03

## 2022-06-30 RX ADMIN — ASPIRIN 81 MG: 81 TABLET, COATED ORAL at 08:28

## 2022-06-30 RX ADMIN — HEPARIN SODIUM 5000 UNITS: 5000 INJECTION INTRAVENOUS; SUBCUTANEOUS at 00:40

## 2022-06-30 RX ADMIN — GABAPENTIN 600 MG: 300 CAPSULE ORAL at 08:28

## 2022-06-30 RX ADMIN — LOSARTAN POTASSIUM 50 MG: 50 TABLET, FILM COATED ORAL at 08:28

## 2022-07-01 ENCOUNTER — READMISSION MANAGEMENT (OUTPATIENT)
Dept: CALL CENTER | Facility: HOSPITAL | Age: 62
End: 2022-07-01

## 2022-07-01 NOTE — OUTREACH NOTE
Prep Survey    Flowsheet Row Responses   [facility] facility patient discharged from? [facility]   Is LACE score < 7 ? Yes   Emergency Room discharge w/ pulse ox? No   Eligibility Readm Mgmt   Discharge diagnosis NSTEMI  type I s/p stenting to the RCA    Does the patient have one of the following disease processes/diagnoses(primary or secondary)? Acute MI (STEMI,NSTEMI)   Does the patient have Home health ordered? No   Is there a DME ordered? No   Prep survey completed? Yes          PRINCE VICTOR - Registered Nurse

## 2022-07-02 LAB
QT INTERVAL: 442 MS
QTC INTERVAL: 480 MS

## 2022-07-04 ENCOUNTER — READMISSION MANAGEMENT (OUTPATIENT)
Dept: CALL CENTER | Facility: HOSPITAL | Age: 62
End: 2022-07-04

## 2022-07-04 NOTE — OUTREACH NOTE
AMI Week 1 Survey    Flowsheet Row Responses   [facility] facility patient discharged from? [facility]   Does the patient have one of the following disease processes/diagnoses(primary or secondary)? Acute MI (STEMI,NSTEMI)   Week 1 attempt successful? No   Unsuccessful attempts Attempt 1          BLAKE BREWSTER - Registered Nurse

## 2022-07-06 ENCOUNTER — READMISSION MANAGEMENT (OUTPATIENT)
Dept: CALL CENTER | Facility: HOSPITAL | Age: 62
End: 2022-07-06

## 2022-07-06 NOTE — OUTREACH NOTE
AMI Week 1 Survey    Flowsheet Row Responses   [facility] facility patient discharged from? [facility]   Does the patient have one of the following disease processes/diagnoses(primary or secondary)? Acute MI (STEMI,NSTEMI)   Week 1 attempt successful? No   Unsuccessful attempts Attempt 2          DEVANTE DOMINGUEZ - Licensed Nurse

## 2022-07-08 ENCOUNTER — READMISSION MANAGEMENT (OUTPATIENT)
Dept: CALL CENTER | Facility: HOSPITAL | Age: 62
End: 2022-07-08

## 2022-07-08 NOTE — OUTREACH NOTE
AMI Week 1 Survey    Flowsheet Row Responses   [facility] patient discharged from? [facility]   Does the patient have one of the following disease processes/diagnoses(primary or secondary)? Acute MI (STEMI,NSTEMI)   Week 1 attempt successful? Yes   Call start time 1521   Call end time 1525   Discharge diagnosis NSTEMI  type I s/p stenting to the RCA    Person spoke with today (if not patient) and relationship souse   Meds reviewed with patient/caregiver? Yes   Is the patient having any side effects they believe may be caused by any medication additions or changes? No   Does the patient have all prescriptions related to this admission filled (includes statins,anticoagulants,HTN meds,anti-arrhythmia meds) Yes   Is the patient taking all medications as directed (includes completed medication regime)? Yes   Does the patient have a primary care provider?  Yes   Does the patient have an appointment with their PCP,cardiologist,or clinic within 7 days of discharge? Yes   Has the patient kept scheduled appointments due by today? N/A   Psychosocial issues? No   Did the patient receive a copy of their discharge instructions? Yes   Nursing interventions Reviewed instructions with patient   What is the patient's perception of their health status since discharge? Improving   Nursing interventions Nurse provided patient education   Is the patient/caregiver able to teach back signs and symptoms of when to call for help immediately: Sudden sweating or clammy skin, Shortness of breath at any time, Sudden discomfort in arms, back, neck or jaw, Sudden chest discomfort, Nausea or vomiting, Dizziness or lightheadedness, Irregular or rapid heart rate   Nursing interventions Nurse provided patient education   Is the patient/caregiver able to teach back lifestyle changes to help prevent MIs Reducing stress, Heart healthy diet, Regular exercise as approved by provider   Is the patient/caregiver able to teach back ways to prevent a second  heart attack: Take medications, Follow up with MD   If the patient is a current smoker, are they able to teach back resources for cessation? Not a smoker   Is the patient/caregiver able to teach back the hierarchy of who to call/visit for symptoms/problems? PCP, Specialist, Home health nurse, Urgent Care, ED, 911 Yes   Week 1 call completed? Yes   Wrap up additional comments Spouse states pt is doing better. Spouse educated on cardiac warning s/s, and when to call 911. Reviewed AVS/medication with spouse. Spouse advised to make PCP fu appt for pt. No questions/concerns.          POLLY DOMINGUEZ - Registered Nurse

## 2022-07-11 ENCOUNTER — DOCUMENTATION (OUTPATIENT)
Dept: CARDIAC REHAB | Facility: HOSPITAL | Age: 62
End: 2022-07-11

## 2022-07-11 NOTE — PROGRESS NOTES
Spoke with Delmar concerning his insurance. I explained I spoke with Barney from [facility] and he stated the patient had a 5000.00 out of pocket and he  He had met 40.00 dollars so until the rest of the out of pocket is met he will have a 35.oo dollar a visit copay . Patient stated he would love to participate in the program but he just cant afford that at this time.

## 2022-07-12 ENCOUNTER — APPOINTMENT (OUTPATIENT)
Dept: CARDIAC REHAB | Facility: HOSPITAL | Age: 62
End: 2022-07-12

## 2022-07-26 ENCOUNTER — OFFICE VISIT (OUTPATIENT)
Dept: CARDIOLOGY | Facility: CLINIC | Age: 62
End: 2022-07-26

## 2022-07-26 VITALS
BODY MASS INDEX: 36.79 KG/M2 | WEIGHT: 277.6 LBS | HEIGHT: 73 IN | SYSTOLIC BLOOD PRESSURE: 135 MMHG | DIASTOLIC BLOOD PRESSURE: 83 MMHG | OXYGEN SATURATION: 98 % | HEART RATE: 71 BPM

## 2022-07-26 DIAGNOSIS — E78.2 MIXED HYPERLIPIDEMIA: ICD-10-CM

## 2022-07-26 DIAGNOSIS — I25.10 CORONARY ARTERY DISEASE INVOLVING NATIVE CORONARY ARTERY OF NATIVE HEART WITHOUT ANGINA PECTORIS: Primary | ICD-10-CM

## 2022-07-26 DIAGNOSIS — I10 PRIMARY HYPERTENSION: ICD-10-CM

## 2022-07-26 PROCEDURE — 99214 OFFICE O/P EST MOD 30 MIN: CPT | Performed by: INTERNAL MEDICINE

## 2022-08-06 PROBLEM — I10 PRIMARY HYPERTENSION: Status: ACTIVE | Noted: 2022-08-06

## 2022-08-06 PROBLEM — I25.10 CORONARY ARTERY DISEASE INVOLVING NATIVE CORONARY ARTERY OF NATIVE HEART WITHOUT ANGINA PECTORIS: Status: ACTIVE | Noted: 2022-08-06

## 2022-08-06 PROBLEM — E78.2 MIXED HYPERLIPIDEMIA: Status: ACTIVE | Noted: 2022-08-06

## 2022-09-09 RX ORDER — ATORVASTATIN CALCIUM 40 MG/1
40 TABLET, FILM COATED ORAL NIGHTLY
Qty: 90 TABLET | Refills: 3 | Status: SHIPPED | OUTPATIENT
Start: 2022-09-09

## 2022-10-06 ENCOUNTER — OFFICE VISIT (OUTPATIENT)
Dept: CARDIOLOGY | Facility: CLINIC | Age: 62
End: 2022-10-06

## 2022-10-06 VITALS
OXYGEN SATURATION: 98 % | HEART RATE: 74 BPM | SYSTOLIC BLOOD PRESSURE: 116 MMHG | DIASTOLIC BLOOD PRESSURE: 74 MMHG | HEIGHT: 73 IN | BODY MASS INDEX: 36.15 KG/M2 | WEIGHT: 272.8 LBS

## 2022-10-06 DIAGNOSIS — I10 PRIMARY HYPERTENSION: ICD-10-CM

## 2022-10-06 DIAGNOSIS — I25.10 CORONARY ARTERY DISEASE INVOLVING NATIVE CORONARY ARTERY OF NATIVE HEART WITHOUT ANGINA PECTORIS: Primary | ICD-10-CM

## 2022-10-06 DIAGNOSIS — E78.2 MIXED HYPERLIPIDEMIA: ICD-10-CM

## 2022-10-06 PROCEDURE — 99213 OFFICE O/P EST LOW 20 MIN: CPT | Performed by: INTERNAL MEDICINE

## 2022-10-06 NOTE — PROGRESS NOTES
[facility] CARDIOLOGY  [address] [place] [address]  Phone: [phone]  Fax: [phone]    10/06/2022    Chief Complaint   Patient presents with   • Follow-up     Patient denies sob, dizziness, edema, chest pain.    • Hypertension     Patient states he monitors bp at home. Bp has been stable.   • Med Management     Reviewed patient medication. Patient states he is tolerating medication well.         History:     Delmar Denise Sr. is a 62 y.o. male presenting for  follow-up evaluation   Clinically stable from cardiac standpoint   Symptoms:  CP no  SOB no    Orthopnea No  Lower extremity edema no   Palpitations no   Compliant with medications no  Claudication no      Past Medical History:   Diagnosis Date   • Arthritis    • Back pain    • Coronary artery disease involving native coronary artery of native heart without angina pectoris s/p RCA PCI in 6/2022 8/6/2022   • Degenerative disc disease, lumbar    • Mixed hyperlipidemia 8/6/2022       Past Surgical History:   Procedure Laterality Date   • ABDOMINAL SURGERY     • CARDIAC CATHETERIZATION N/A 6/29/2022    Procedure: Left Heart Cath;  Surgeon: Donovan Swartz MD;  Location: [facility] CATH INVASIVE LOCATION;  Service: Cardiology;  Laterality: N/A;   • CARDIAC CATHETERIZATION N/A 6/29/2022    Procedure: Percutaneous Coronary Intervention;  Surgeon: Donovan Swartz MD;  Location: [facility] CATH INVASIVE LOCATION;  Service: Cardiology;  Laterality: N/A;   • COLONOSCOPY     • HEAD/NECK LESION/CYST EXCISION Left 8/25/2020    Procedure: Excision lipoma left posterior scalp;  Surgeon: Sravani Celaya MD;  Location: [facility];  Service: General;  Laterality: Left;   • JOINT REPLACEMENT Left    • KNEE SURGERY Bilateral    • KNEE SURGERY      bilateraly   • SHOULDER SURGERY     • SHOULDER SURGERY      bilateral        Past Social History:  Social History     Socioeconomic History   • Marital status:   "  Tobacco Use   • Smoking status: Never   • Smokeless tobacco: Former     Types: Snuff   Vaping Use   • Vaping Use: Never used   Substance and Sexual Activity   • Alcohol use: Yes     Comment: occasionally   • Drug use: No   • Sexual activity: Defer     Partners: Female     Birth control/protection: None       Past Family History:  Family History   Adopted: Yes   Family history unknown: Yes       Review of Systems:   ROS       Current Outpatient Medications   Medication Sig Dispense Refill   • aspirin 81 MG EC tablet Take 1 tablet by mouth Daily. 90 tablet 0   • atorvastatin (LIPITOR) 40 MG tablet Take 1 tablet by mouth Every Night. 90 tablet 3   • gabapentin (NEURONTIN) 300 MG capsule Take 600 mg by mouth 2 (Two) Times a Day.     • losartan (COZAAR) 50 MG tablet Take 50 mg by mouth Daily.     • metoprolol tartrate (LOPRESSOR) 25 MG tablet Take 1/2 tablet by mouth Every 12 (Twelve) Hours. 90 tablet 3   • ticagrelor (BRILINTA) 90 MG tablet tablet Take 1 tablet by mouth 2 (Two) Times a Day. 180 tablet 3   • traMADol (ULTRAM) 50 MG tablet Take 50 mg by mouth 2 (Two) Times a Day As Needed for Moderate Pain .     • cyclobenzaprine (FLEXERIL) 10 MG tablet Take 10 mg by mouth 2 (Two) Times a Day As Needed for Muscle Spasms.     • meloxicam (MOBIC) 15 MG tablet Take 15 mg by mouth Daily.       No current facility-administered medications for this visit.        No Known Allergies    Objective     /74 (BP Location: Left arm, Patient Position: Sitting, Cuff Size: Large Adult)   Pulse 74   Ht 185.4 cm (72.99\")   Wt 124 kg (272 lb 12.8 oz)   SpO2 98%   BMI 36.00 kg/m²     Physical Exam       DATA:   Results for orders placed during the hospital encounter of 06/29/22    Adult Transthoracic Echo Complete W/ Cont if Necessary Per Protocol    Interpretation Summary  · The study is technically suboptimal for diagnosis with poor acoustic windows in the parasternal window, apical window and subcostal window. The quality of " the study is limited due to patient body habitus  · Left ventricular ejection fraction appears to be 51 - 55%. Left ventricular systolic function is normal.  · Left ventricular diastolic function is consistent with (grade I) impaired relaxation.  · There is no evidence of pericardial effusion     Results for orders placed during the hospital encounter of 06/29/22    Cardiac Catheterization/Vascular Study    Narrative  Images from the original result were not included.  CARDIAC CATHETERIZATION / INTERVENTION REPORT            DATE OF PROCEDURE: 6/29/2022      INDICATION FOR PROCEDURE: NSTEMI      PRE PROCEDURE DIAGNOSIS:  NSTEMI  HTN    POST PROCEDURE DIAGNOSIS:  CAD proximal % occlusion s/p PCI with 3.5 x 18 MATTHEW post dilated with 4.5 NC balloon at high pressure    Face to face mdoerate conscious  sedation time :      COMPLICATIONS : None    Specimens collected : None    PROCEDURE PERFORMED:    1. Selective right and left Coronary Angiogram  2. Left heart catheretization  3. Left Ventriculography  4. PCI of RCA  5. IVUS of RCA  6.  Manual aspiration thrombectomy of RCA    Description of the procedure:  Prior to the procedure risk, benefits and possible alternative were discussed with the patient and informed consent was obtained. Patient was brought to cardiac cath lab table in post absorbtive state. Patient was prepped and drape in usual sterile fashion. IV Versed and Fentanyl was used for moderate sedation. 2% Lidocaine was used for topical anesthesia. R radial arterial site was prepped and a micropuncture needle was used to access the artery and a 5 F slender sheath was placed. 2.5 mg of Verapamil and 200 mcg of NTG was given through the sheath intra arterial and 5000 units of Heparin was given once the catheter crossed the aortic arch.    5 F TIG 4 catheters was used for right and left coronary angiogram and 5 F pigtail catheter was used for Left heart hemodynamics and left ventriculography. All the  catheters were exchanged over 0.035 wire. The R radial arterial sheath was removed and TR band was applied and immediate and complete hemostasis was achieved. The patient tolerate the entire procedure well without any immediate known complications.    Coronary anatomy findings:    LM: Is a large calibre vessel , normal take off from left cusp, divides into LAD and Lcx. No significant stenosis noted    LAD: Large calibre vessel, mid after origin of D1 had mild 40% stenosis, no other significant stenosis noted, D1 patent    LCX: Moderate calibre vessel, mild luminal irregularities. Large hign OM1 artery with no stenosis, small OM2 artery which had about 40-50% stenosis in proximal portion.    RCA: Large calibre, dominant artery, normal take off from right cusp.  100% occluded in proximal after take of conus branch s/p PCI with LILIAN 3 flow, mid had mild 30-40% stenosis, RPDA and PL branches had no stenosis. .    Left Ventriculography:    LV systolic function was mildly reduced  with visual estimated EF of 45-50% with inferior wall hypokinesia.  No significant mitral regurgitation noted.    LVEDP: 21 mmHg  No gradient across the aortic valve on pull back.      PERCUTANEOUS CORONARY INTERVENTION PROCEDURE NOTE:    The patient did not have any ST elevations on presentation, he already had collaterals supplying the distal RPDA and PL, I did asked the patient regarding symptom onset and he was very confident that his first ever chest pain was last night which is less than 24 hours so I decided to try to wire and do angioplasty to see if occlusion is more of an acute rather than subacute.    Heparin monotherapy was used for anticoagulation.  Total of 10,000 Units was given IV. Pt also received single bolus integrelin    6 F JR 4 guide was used to engage the RCA coaxially.    0.014 Runthrough guidewire was used to cross the lesion and parked distally.  There was no challenges in wiring the artery likely an acute  occlusion.    There was significant clot burden even after wiring of the RCA so used Pronto LP manual aspiration catheter and significant red thrombi was extracted which actually did establish a good LILIAN-3 flow and 90% stenosis at the plaque rupture site.    Used a 3.0 x 15 compliant TREK balloon to predilate the lesion at 20 linda.    Prepped a 3.5 x 18 Gladis Drug eluting stent and position at the lesion and successfully deployed at 16 linda.  The stent did appear to be undersized for the more distal portion of the stent and the distal reference artery diameter was at least 5 mm from IVUS, I then used a 4.5 NC balloon to postdilated 20 linda.    Post stent deployment angio pictures showed good expansion with 0% residual stenosis, LILIAN 3 flow in the distal vascular bed and no dissection or distal wire perforation.    Lesion length: 15 mm  Pre PCI Stenosis: 100 %  Post PCI stenosis: 0 %  Pre PCI LILIAN flow: 0  Post PCI LILIAN flow: 3    EBL: Less than 10cc              Recommendations:  Aggressive guideline directed medical management for CAD  Dual Anti platelet medication with Asa 81 mg qd and Brilinta 90 mg bid for minimum 1 year.            Donovan Swartz MD, [facility]  Interventional Cardiology    06/29/22  12:39 EDT    Procedures     Lab Results   Component Value Date    CHOL 118 06/29/2022     Lab Results   Component Value Date    TRIG 59 06/29/2022     Lab Results   Component Value Date    HDL 50 06/29/2022     Lab Results   Component Value Date    LDL 55 06/29/2022       Lab Results   Component Value Date    TSH 4.540 (H) 06/29/2022               Invalid input(s): LABALBU, PROT        Assessment and Plan     Diagnosis Plan   1. Coronary artery disease involving native coronary artery of native heart without angina pectoris s/p RCA PCI in 6/2022         2. Mixed hyperlipidemia        3. Primary hypertension                Recommended increase activity to 30 minutes of walking daily, most days of the week    Thank you  for allowing me to participate in the care of Delmar Lucioshanta Denise Sr.. Feel free to contact me directly with any further questions or concerns.          Donovan Swartz MD, FACC  Interventional Cardiology

## 2022-10-31 ENCOUNTER — TELEPHONE (OUTPATIENT)
Dept: CARDIOLOGY | Facility: CLINIC | Age: 62
End: 2022-10-31

## 2022-10-31 NOTE — TELEPHONE ENCOUNTER
Caller: Monae Denise    Relationship: Emergency Contact    Best call back number: [phone]    Requested Prescriptions:   Requested Prescriptions     Pending Prescriptions Disp Refills   • metoprolol tartrate (LOPRESSOR) 25 MG tablet 90 tablet 3     Sig: Take 1/2 tablet by mouth Every 12 (Twelve) Hours.        Pharmacy where request should be sent: [facility] [id number] [facility] [address] AT [facility] [address] [facility] [phone] [facility] [phone] FX     Additional details provided by patient: PT REPORTS HIS PRESCRIPTION DOSAGE WAS INCREASED AND HE IS RUNNING OUT OF HIS MEDICATION SOONER THAN NORMAL -     Does the patient have less than a 3 day supply:  [] Yes  [x] No    Shravan Paz Rep   10/31/22 13:18 EDT

## 2023-02-08 ENCOUNTER — OFFICE VISIT (OUTPATIENT)
Dept: CARDIOLOGY | Facility: CLINIC | Age: 63
End: 2023-02-08
Payer: MEDICARE

## 2023-02-08 VITALS
OXYGEN SATURATION: 98 % | DIASTOLIC BLOOD PRESSURE: 74 MMHG | HEART RATE: 72 BPM | BODY MASS INDEX: 37.93 KG/M2 | HEIGHT: 73 IN | SYSTOLIC BLOOD PRESSURE: 124 MMHG | WEIGHT: 286.2 LBS

## 2023-02-08 DIAGNOSIS — E78.2 MIXED HYPERLIPIDEMIA: Primary | ICD-10-CM

## 2023-02-08 DIAGNOSIS — I25.10 CORONARY ARTERY DISEASE INVOLVING NATIVE CORONARY ARTERY OF NATIVE HEART WITHOUT ANGINA PECTORIS: ICD-10-CM

## 2023-02-08 DIAGNOSIS — I10 PRIMARY HYPERTENSION: ICD-10-CM

## 2023-02-08 PROCEDURE — 99213 OFFICE O/P EST LOW 20 MIN: CPT | Performed by: INTERNAL MEDICINE

## 2023-02-08 NOTE — PROGRESS NOTES
[facility] CARDIOLOGY  [address] [facility] [address] [place] [address]  Phone: [phone]  Fax: [phone]    02/08/2023    Chief Complaint   Patient presents with   • Coronary Artery Disease     F/U   • Follow-up     Pt denies CP, no other complaints   • MEDICATION REVIEW     Verbally reviewed meds,tolerating all med well        History:     Delmar Denise Sr. is a 62 y.o. male presenting for  follow-up evaluation   Clinically stable from cardiac standpoint   Symptoms:  CP no  SOB no    Orthopnea No  Lower extremity edema no   Palpitations no   Compliant with medications no  Claudication no      Past Medical History:   Diagnosis Date   • Arthritis    • Back pain    • Coronary artery disease involving native coronary artery of native heart without angina pectoris s/p RCA PCI in 6/2022 8/6/2022   • Degenerative disc disease, lumbar    • Mixed hyperlipidemia 8/6/2022       Past Surgical History:   Procedure Laterality Date   • ABDOMINAL SURGERY     • CARDIAC CATHETERIZATION N/A 6/29/2022    Procedure: Left Heart Cath;  Surgeon: Donovan Swartz MD;  Location: [facility] INVASIVE LOCATION;  Service: Cardiology;  Laterality: N/A;   • CARDIAC CATHETERIZATION N/A 6/29/2022    Procedure: Percutaneous Coronary Intervention;  Surgeon: Donovan Swartz MD;  Location: [facility] INVASIVE LOCATION;  Service: Cardiology;  Laterality: N/A;   • COLONOSCOPY     • HEAD/NECK LESION/CYST EXCISION Left 8/25/2020    Procedure: Excision lipoma left posterior scalp;  Surgeon: Sravani Celaya MD;  Location: [facility];  Service: General;  Laterality: Left;   • JOINT REPLACEMENT Left    • KNEE SURGERY Bilateral    • KNEE SURGERY      bilateraly   • SHOULDER SURGERY     • SHOULDER SURGERY      bilateral        Past Social History:  Social History     Socioeconomic History   • Marital status:    Tobacco Use   • Smoking status: Never   • Smokeless tobacco: Former     Types: Snuff  "  Vaping Use   • Vaping Use: Never used   Substance and Sexual Activity   • Alcohol use: Yes     Comment: occasionally   • Drug use: No   • Sexual activity: Defer     Partners: Female     Birth control/protection: None       Past Family History:  Family History   Adopted: Yes   Family history unknown: Yes       Review of Systems:   ROS       Current Outpatient Medications   Medication Sig Dispense Refill   • aspirin 81 MG EC tablet Take 1 tablet by mouth Daily. 90 tablet 0   • atorvastatin (LIPITOR) 40 MG tablet Take 1 tablet by mouth Every Night. 90 tablet 3   • gabapentin (NEURONTIN) 300 MG capsule Take 600 mg by mouth 2 (Two) Times a Day.     • losartan (COZAAR) 50 MG tablet Take 50 mg by mouth Daily.     • metoprolol tartrate (LOPRESSOR) 25 MG tablet Take 1/2 tablet by mouth Every 12 (Twelve) Hours. 90 tablet 3   • ticagrelor (BRILINTA) 90 MG tablet tablet Take 1 tablet by mouth 2 (Two) Times a Day. 180 tablet 3   • cyclobenzaprine (FLEXERIL) 10 MG tablet Take 10 mg by mouth 2 (Two) Times a Day As Needed for Muscle Spasms.     • meloxicam (MOBIC) 15 MG tablet Take 15 mg by mouth Daily.     • traMADol (ULTRAM) 50 MG tablet Take 50 mg by mouth 2 (Two) Times a Day As Needed for Moderate Pain .       No current facility-administered medications for this visit.        No Known Allergies    Objective     /74   Pulse 72   Ht 185.4 cm (73\")   Wt 130 kg (286 lb 3.2 oz)   SpO2 98%   BMI 37.76 kg/m²     Physical Exam       DATA:   Results for orders placed during the hospital encounter of 06/29/22    Adult Transthoracic Echo Complete W/ Cont if Necessary Per Protocol    Interpretation Summary  · The study is technically suboptimal for diagnosis with poor acoustic windows in the parasternal window, apical window and subcostal window. The quality of the study is limited due to patient body habitus  · Left ventricular ejection fraction appears to be 51 - 55%. Left ventricular systolic function is normal.  · Left " ventricular diastolic function is consistent with (grade I) impaired relaxation.  · There is no evidence of pericardial effusion     Results for orders placed during the hospital encounter of 06/29/22    Cardiac Catheterization/Vascular Study    Narrative  Images from the original result were not included.  CARDIAC CATHETERIZATION / INTERVENTION REPORT            DATE OF PROCEDURE: 6/29/2022      INDICATION FOR PROCEDURE: NSTEMI      PRE PROCEDURE DIAGNOSIS:  NSTEMI  HTN    POST PROCEDURE DIAGNOSIS:  CAD proximal % occlusion s/p PCI with 3.5 x 18 MATTHEW post dilated with 4.5 NC balloon at high pressure    Face to face mdoerate conscious  sedation time :      COMPLICATIONS : None    Specimens collected : None    PROCEDURE PERFORMED:    1. Selective right and left Coronary Angiogram  2. Left heart catheretization  3. Left Ventriculography  4. PCI of RCA  5. IVUS of RCA  6.  Manual aspiration thrombectomy of RCA    Description of the procedure:  Prior to the procedure risk, benefits and possible alternative were discussed with the patient and informed consent was obtained. Patient was brought to cardiac cath lab table in post absorbtive state. Patient was prepped and drape in usual sterile fashion. IV Versed and Fentanyl was used for moderate sedation. 2% Lidocaine was used for topical anesthesia. R radial arterial site was prepped and a micropuncture needle was used to access the artery and a 5 F slender sheath was placed. 2.5 mg of Verapamil and 200 mcg of NTG was given through the sheath intra arterial and 5000 units of Heparin was given once the catheter crossed the aortic arch.    5 F TIG 4 catheters was used for right and left coronary angiogram and 5 F pigtail catheter was used for Left heart hemodynamics and left ventriculography. All the catheters were exchanged over 0.035 wire. The R radial arterial sheath was removed and TR band was applied and immediate and complete hemostasis was achieved. The patient  tolerate the entire procedure well without any immediate known complications.    Coronary anatomy findings:    LM: Is a large calibre vessel , normal take off from left cusp, divides into LAD and Lcx. No significant stenosis noted    LAD: Large calibre vessel, mid after origin of D1 had mild 40% stenosis, no other significant stenosis noted, D1 patent    LCX: Moderate calibre vessel, mild luminal irregularities. Large hign OM1 artery with no stenosis, small OM2 artery which had about 40-50% stenosis in proximal portion.    RCA: Large calibre, dominant artery, normal take off from right cusp.  100% occluded in proximal after take of conus branch s/p PCI with LILIAN 3 flow, mid had mild 30-40% stenosis, RPDA and PL branches had no stenosis. .    Left Ventriculography:    LV systolic function was mildly reduced  with visual estimated EF of 45-50% with inferior wall hypokinesia.  No significant mitral regurgitation noted.    LVEDP: 21 mmHg  No gradient across the aortic valve on pull back.      PERCUTANEOUS CORONARY INTERVENTION PROCEDURE NOTE:    The patient did not have any ST elevations on presentation, he already had collaterals supplying the distal RPDA and PL, I did asked the patient regarding symptom onset and he was very confident that his first ever chest pain was last night which is less than 24 hours so I decided to try to wire and do angioplasty to see if occlusion is more of an acute rather than subacute.    Heparin monotherapy was used for anticoagulation.  Total of 10,000 Units was given IV. Pt also received single bolus integrelin    6 F JR 4 guide was used to engage the RCA coaxially.    0.014 Runthrough guidewire was used to cross the lesion and parked distally.  There was no challenges in wiring the artery likely an acute occlusion.    There was significant clot burden even after wiring of the RCA so used [employer] manual aspiration catheter and significant red thrombi was extracted which actually did  establish a good LILIAN-3 flow and 90% stenosis at the plaque rupture site.    Used a 3.0 x 15 compliant TREK balloon to predilate the lesion at 20 linda.    Prepped a 3.5 x 18 [employer] eluting stent and position at the lesion and successfully deployed at 16 linda.  The stent did appear to be undersized for the more distal portion of the stent and the distal reference artery diameter was at least 5 mm from IVUS, I then used a 4.5 NC balloon to postdilated 20 linda.    Post stent deployment angio pictures showed good expansion with 0% residual stenosis, LILIAN 3 flow in the distal vascular bed and no dissection or distal wire perforation.    Lesion length: 15 mm  Pre PCI Stenosis: 100 %  Post PCI stenosis: 0 %  Pre PCI LILIAN flow: 0  Post PCI LILIAN flow: 3    EBL: Less than 10cc              Recommendations:  Aggressive guideline directed medical management for CAD  Dual Anti platelet medication with Asa 81 mg qd and Brilinta 90 mg bid for minimum 1 year.            Donovan MD Sher[place], [facility]  Interventional Cardiology    06/29/22  12:39 EDT    Procedures     Lab Results   Component Value Date    CHOL 118 06/29/2022     Lab Results   Component Value Date    TRIG 59 06/29/2022     Lab Results   Component Value Date    HDL 50 06/29/2022     Lab Results   Component Value Date    LDL 55 06/29/2022       Lab Results   Component Value Date    TSH 4.540 (H) 06/29/2022               Invalid input(s): LABALBU, PROT        Assessment and Plan     Diagnosis Plan   1. Mixed hyperlipidemia        2. Primary hypertension        3. Coronary artery disease involving native coronary artery of native heart without angina pectoris s/p RCA PCI in 6/2022             Patient stable from cardiac standpoint continue with current medication    Recommended increase activity to 30 minutes of walking daily, most days of the week    Thank you for allowing me to participate in the care of Delmar Denise Sr.. Feel free to contact me directly with any  further questions or concerns.          Donovan MD [place], FACC  Interventional Cardiology

## 2023-09-05 ENCOUNTER — TRANSCRIBE ORDERS (OUTPATIENT)
Dept: ADMINISTRATIVE | Facility: HOSPITAL | Age: 63
End: 2023-09-05
Payer: MEDICARE

## 2023-09-05 ENCOUNTER — HOSPITAL ENCOUNTER (OUTPATIENT)
Dept: GENERAL RADIOLOGY | Facility: HOSPITAL | Age: 63
Discharge: HOME | End: 2023-09-05
Admitting: NURSE PRACTITIONER
Payer: MEDICARE

## 2023-09-05 DIAGNOSIS — M51.9 INTERVERTEBRAL DISC DISORDER: ICD-10-CM

## 2023-09-05 DIAGNOSIS — M51.9 INTERVERTEBRAL DISC DISORDER: Primary | ICD-10-CM

## 2023-09-05 PROCEDURE — 72110 X-RAY EXAM L-2 SPINE 4/>VWS: CPT

## 2023-09-06 RX ORDER — ATORVASTATIN CALCIUM 40 MG/1
TABLET, FILM COATED ORAL
Qty: 90 TABLET | Refills: 3 | Status: SHIPPED | OUTPATIENT
Start: 2023-09-06

## 2023-09-21 ENCOUNTER — HOSPITAL ENCOUNTER (OUTPATIENT)
Facility: HOSPITAL | Age: 63
Setting detail: OBSERVATION
Discharge: HOME | End: 2023-09-22
Attending: FAMILY MEDICINE | Admitting: STUDENT IN AN ORGANIZED HEALTH CARE EDUCATION/TRAINING PROGRAM
Payer: MEDICARE

## 2023-09-21 ENCOUNTER — APPOINTMENT (OUTPATIENT)
Dept: GENERAL RADIOLOGY | Facility: HOSPITAL | Age: 63
End: 2023-09-21
Payer: MEDICARE

## 2023-09-21 DIAGNOSIS — I20.0 UNSTABLE ANGINA: Primary | ICD-10-CM

## 2023-09-21 LAB
ALBUMIN SERPL-MCNC: 4.2 G/DL (ref 3.5–5.2)
ALBUMIN/GLOB SERPL: 1.4 G/DL
ALP SERPL-CCNC: 90 U/L (ref 39–117)
ALT SERPL W P-5'-P-CCNC: 18 U/L (ref 1–41)
ANION GAP SERPL CALCULATED.3IONS-SCNC: 10.5 MMOL/L (ref 5–15)
AST SERPL-CCNC: 22 U/L (ref 1–40)
BASOPHILS # BLD AUTO: 0.04 10*3/MM3 (ref 0–0.2)
BASOPHILS NFR BLD AUTO: 0.5 % (ref 0–1.5)
BILIRUB SERPL-MCNC: 0.7 MG/DL (ref 0–1.2)
BUN SERPL-MCNC: 22 MG/DL (ref 8–23)
BUN/CREAT SERPL: 18 (ref 7–25)
CALCIUM SPEC-SCNC: 9.3 MG/DL (ref 8.6–10.5)
CHLORIDE SERPL-SCNC: 104 MMOL/L (ref 98–107)
CHOLEST SERPL-MCNC: 81 MG/DL (ref 0–200)
CO2 SERPL-SCNC: 24.5 MMOL/L (ref 22–29)
CREAT SERPL-MCNC: 1.22 MG/DL (ref 0.76–1.27)
DEPRECATED RDW RBC AUTO: 46.9 FL (ref 37–54)
EGFRCR SERPLBLD CKD-EPI 2021: 66.2 ML/MIN/1.73
EOSINOPHIL # BLD AUTO: 0.04 10*3/MM3 (ref 0–0.4)
EOSINOPHIL NFR BLD AUTO: 0.5 % (ref 0.3–6.2)
ERYTHROCYTE [DISTWIDTH] IN BLOOD BY AUTOMATED COUNT: 14.6 % (ref 12.3–15.4)
GEN 5 2HR TROPONIN T REFLEX: 18 NG/L
GLOBULIN UR ELPH-MCNC: 2.9 GM/DL
GLUCOSE SERPL-MCNC: 104 MG/DL (ref 65–99)
HBA1C MFR BLD: 5.9 % (ref 4.8–5.6)
HCT VFR BLD AUTO: 41.3 % (ref 37.5–51)
HDLC SERPL-MCNC: 52 MG/DL (ref 40–60)
HGB BLD-MCNC: 13.4 G/DL (ref 13–17.7)
HOLD SPECIMEN: NORMAL
HOLD SPECIMEN: NORMAL
IMM GRANULOCYTES # BLD AUTO: 0.02 10*3/MM3 (ref 0–0.05)
IMM GRANULOCYTES NFR BLD AUTO: 0.2 % (ref 0–0.5)
LDLC SERPL CALC-MCNC: 12 MG/DL (ref 0–100)
LDLC/HDLC SERPL: 0.22 {RATIO}
LYMPHOCYTES # BLD AUTO: 1.93 10*3/MM3 (ref 0.7–3.1)
LYMPHOCYTES NFR BLD AUTO: 22.2 % (ref 19.6–45.3)
MCH RBC QN AUTO: 28.6 PG (ref 26.6–33)
MCHC RBC AUTO-ENTMCNC: 32.4 G/DL (ref 31.5–35.7)
MCV RBC AUTO: 88.2 FL (ref 79–97)
MONOCYTES # BLD AUTO: 0.46 10*3/MM3 (ref 0.1–0.9)
MONOCYTES NFR BLD AUTO: 5.3 % (ref 5–12)
NEUTROPHILS NFR BLD AUTO: 6.19 10*3/MM3 (ref 1.7–7)
NEUTROPHILS NFR BLD AUTO: 71.3 % (ref 42.7–76)
NRBC BLD AUTO-RTO: 0 /100 WBC (ref 0–0.2)
PLATELET # BLD AUTO: 230 10*3/MM3 (ref 140–450)
PMV BLD AUTO: 8.9 FL (ref 6–12)
POTASSIUM SERPL-SCNC: 3.8 MMOL/L (ref 3.5–5.2)
PROT SERPL-MCNC: 7.1 G/DL (ref 6–8.5)
RBC # BLD AUTO: 4.68 10*6/MM3 (ref 4.14–5.8)
SODIUM SERPL-SCNC: 139 MMOL/L (ref 136–145)
TRIGL SERPL-MCNC: 87 MG/DL (ref 0–150)
TROPONIN T DELTA: -4 NG/L
TROPONIN T SERPL HS-MCNC: 22 NG/L
TSH SERPL DL<=0.05 MIU/L-ACNC: 0.79 UIU/ML (ref 0.27–4.2)
VLDLC SERPL-MCNC: 17 MG/DL (ref 5–40)
WBC NRBC COR # BLD: 8.68 10*3/MM3 (ref 3.4–10.8)
WHOLE BLOOD HOLD COAG: NORMAL
WHOLE BLOOD HOLD SPECIMEN: NORMAL

## 2023-09-21 PROCEDURE — 71045 X-RAY EXAM CHEST 1 VIEW: CPT

## 2023-09-21 PROCEDURE — 99284 EMERGENCY DEPT VISIT MOD MDM: CPT

## 2023-09-21 PROCEDURE — 80053 COMPREHEN METABOLIC PANEL: CPT | Performed by: FAMILY MEDICINE

## 2023-09-21 PROCEDURE — 71045 X-RAY EXAM CHEST 1 VIEW: CPT | Performed by: RADIOLOGY

## 2023-09-21 PROCEDURE — 83036 HEMOGLOBIN GLYCOSYLATED A1C: CPT | Performed by: STUDENT IN AN ORGANIZED HEALTH CARE EDUCATION/TRAINING PROGRAM

## 2023-09-21 PROCEDURE — G0378 HOSPITAL OBSERVATION PER HR: HCPCS

## 2023-09-21 PROCEDURE — 84484 ASSAY OF TROPONIN QUANT: CPT | Performed by: FAMILY MEDICINE

## 2023-09-21 PROCEDURE — 93010 ELECTROCARDIOGRAM REPORT: CPT | Performed by: INTERNAL MEDICINE

## 2023-09-21 PROCEDURE — 80061 LIPID PANEL: CPT | Performed by: STUDENT IN AN ORGANIZED HEALTH CARE EDUCATION/TRAINING PROGRAM

## 2023-09-21 PROCEDURE — 85025 COMPLETE CBC W/AUTO DIFF WBC: CPT | Performed by: FAMILY MEDICINE

## 2023-09-21 PROCEDURE — 96372 THER/PROPH/DIAG INJ SC/IM: CPT

## 2023-09-21 PROCEDURE — 25010000002 ENOXAPARIN PER 10 MG: Performed by: STUDENT IN AN ORGANIZED HEALTH CARE EDUCATION/TRAINING PROGRAM

## 2023-09-21 PROCEDURE — 84443 ASSAY THYROID STIM HORMONE: CPT | Performed by: STUDENT IN AN ORGANIZED HEALTH CARE EDUCATION/TRAINING PROGRAM

## 2023-09-21 PROCEDURE — 93005 ELECTROCARDIOGRAM TRACING: CPT | Performed by: FAMILY MEDICINE

## 2023-09-21 PROCEDURE — 36415 COLL VENOUS BLD VENIPUNCTURE: CPT

## 2023-09-21 RX ORDER — HYDROCODONE BITARTRATE AND ACETAMINOPHEN 7.5; 325 MG/1; MG/1
1 TABLET ORAL NIGHTLY
Status: CANCELLED | OUTPATIENT
Start: 2023-09-21

## 2023-09-21 RX ORDER — ASPIRIN 81 MG/1
81 TABLET ORAL DAILY
Status: DISCONTINUED | OUTPATIENT
Start: 2023-09-22 | End: 2023-09-22 | Stop reason: HOSPADM

## 2023-09-21 RX ORDER — ACETAMINOPHEN 325 MG/1
650 TABLET ORAL EVERY 4 HOURS PRN
Status: DISCONTINUED | OUTPATIENT
Start: 2023-09-21 | End: 2023-09-22 | Stop reason: HOSPADM

## 2023-09-21 RX ORDER — SODIUM CHLORIDE 0.9 % (FLUSH) 0.9 %
10 SYRINGE (ML) INJECTION AS NEEDED
Status: DISCONTINUED | OUTPATIENT
Start: 2023-09-21 | End: 2023-09-22 | Stop reason: HOSPADM

## 2023-09-21 RX ORDER — LOSARTAN POTASSIUM 50 MG/1
50 TABLET ORAL DAILY
Status: DISCONTINUED | OUTPATIENT
Start: 2023-09-21 | End: 2023-09-21

## 2023-09-21 RX ORDER — ASPIRIN 325 MG
325 TABLET ORAL ONCE
Status: DISCONTINUED | OUTPATIENT
Start: 2023-09-21 | End: 2023-09-21

## 2023-09-21 RX ORDER — GABAPENTIN 300 MG/1
600 CAPSULE ORAL 2 TIMES DAILY
Status: DISCONTINUED | OUTPATIENT
Start: 2023-09-21 | End: 2023-09-22 | Stop reason: HOSPADM

## 2023-09-21 RX ORDER — RANOLAZINE 500 MG/1
500 TABLET, EXTENDED RELEASE ORAL EVERY 12 HOURS SCHEDULED
Status: DISCONTINUED | OUTPATIENT
Start: 2023-09-21 | End: 2023-09-22 | Stop reason: HOSPADM

## 2023-09-21 RX ORDER — HYDROCODONE BITARTRATE AND ACETAMINOPHEN 7.5; 325 MG/1; MG/1
1 TABLET ORAL EVERY 6 HOURS PRN
Status: DISCONTINUED | OUTPATIENT
Start: 2023-09-21 | End: 2023-09-22 | Stop reason: HOSPADM

## 2023-09-21 RX ORDER — NITROGLYCERIN 0.4 MG/1
0.4 TABLET SUBLINGUAL ONCE
Status: COMPLETED | OUTPATIENT
Start: 2023-09-21 | End: 2023-09-21

## 2023-09-21 RX ORDER — ISOSORBIDE MONONITRATE 30 MG/1
30 TABLET, EXTENDED RELEASE ORAL
Status: DISCONTINUED | OUTPATIENT
Start: 2023-09-22 | End: 2023-09-22 | Stop reason: HOSPADM

## 2023-09-21 RX ORDER — GABAPENTIN 600 MG/1
600 TABLET ORAL 2 TIMES DAILY
COMMUNITY

## 2023-09-21 RX ORDER — AMOXICILLIN 250 MG
2 CAPSULE ORAL 2 TIMES DAILY
Status: DISCONTINUED | OUTPATIENT
Start: 2023-09-21 | End: 2023-09-22 | Stop reason: HOSPADM

## 2023-09-21 RX ORDER — GABAPENTIN 300 MG/1
600 CAPSULE ORAL EVERY 12 HOURS SCHEDULED
Status: CANCELLED | OUTPATIENT
Start: 2023-09-21

## 2023-09-21 RX ORDER — BISACODYL 10 MG
10 SUPPOSITORY, RECTAL RECTAL DAILY PRN
Status: DISCONTINUED | OUTPATIENT
Start: 2023-09-21 | End: 2023-09-22 | Stop reason: HOSPADM

## 2023-09-21 RX ORDER — ATORVASTATIN CALCIUM 40 MG/1
40 TABLET, FILM COATED ORAL NIGHTLY
Status: DISCONTINUED | OUTPATIENT
Start: 2023-09-21 | End: 2023-09-22 | Stop reason: HOSPADM

## 2023-09-21 RX ORDER — ENOXAPARIN SODIUM 100 MG/ML
40 INJECTION SUBCUTANEOUS NIGHTLY
Status: DISCONTINUED | OUTPATIENT
Start: 2023-09-21 | End: 2023-09-22 | Stop reason: HOSPADM

## 2023-09-21 RX ORDER — BISACODYL 5 MG/1
5 TABLET, DELAYED RELEASE ORAL DAILY PRN
Status: DISCONTINUED | OUTPATIENT
Start: 2023-09-21 | End: 2023-09-22 | Stop reason: HOSPADM

## 2023-09-21 RX ORDER — HYDROCODONE BITARTRATE AND ACETAMINOPHEN 7.5; 325 MG/1; MG/1
1 TABLET ORAL NIGHTLY
COMMUNITY

## 2023-09-21 RX ORDER — POLYETHYLENE GLYCOL 3350 17 G/17G
17 POWDER, FOR SOLUTION ORAL DAILY PRN
Status: DISCONTINUED | OUTPATIENT
Start: 2023-09-21 | End: 2023-09-22 | Stop reason: HOSPADM

## 2023-09-21 RX ORDER — SODIUM CHLORIDE 0.9 % (FLUSH) 0.9 %
10 SYRINGE (ML) INJECTION EVERY 12 HOURS SCHEDULED
Status: DISCONTINUED | OUTPATIENT
Start: 2023-09-21 | End: 2023-09-22 | Stop reason: HOSPADM

## 2023-09-21 RX ORDER — LOSARTAN POTASSIUM 25 MG/1
25 TABLET ORAL DAILY
Status: DISCONTINUED | OUTPATIENT
Start: 2023-09-22 | End: 2023-09-22 | Stop reason: HOSPADM

## 2023-09-21 RX ORDER — ASPIRIN 81 MG/1
324 TABLET, CHEWABLE ORAL ONCE
Status: COMPLETED | OUTPATIENT
Start: 2023-09-21 | End: 2023-09-21

## 2023-09-21 RX ORDER — NITROGLYCERIN 0.4 MG/1
0.4 TABLET SUBLINGUAL
Status: DISCONTINUED | OUTPATIENT
Start: 2023-09-21 | End: 2023-09-22 | Stop reason: HOSPADM

## 2023-09-21 RX ORDER — ONDANSETRON 2 MG/ML
4 INJECTION INTRAMUSCULAR; INTRAVENOUS EVERY 6 HOURS PRN
Status: DISCONTINUED | OUTPATIENT
Start: 2023-09-21 | End: 2023-09-22 | Stop reason: HOSPADM

## 2023-09-21 RX ORDER — SODIUM CHLORIDE 9 MG/ML
40 INJECTION, SOLUTION INTRAVENOUS AS NEEDED
Status: DISCONTINUED | OUTPATIENT
Start: 2023-09-21 | End: 2023-09-22 | Stop reason: HOSPADM

## 2023-09-21 RX ADMIN — ASPIRIN 324 MG: 81 TABLET, CHEWABLE ORAL at 12:32

## 2023-09-21 RX ADMIN — ATORVASTATIN CALCIUM 40 MG: 40 TABLET, FILM COATED ORAL at 21:55

## 2023-09-21 RX ADMIN — ENOXAPARIN SODIUM 40 MG: 40 INJECTION SUBCUTANEOUS at 21:55

## 2023-09-21 RX ADMIN — NITROGLYCERIN 0.4 MG: 0.4 TABLET, ORALLY DISINTEGRATING SUBLINGUAL at 14:53

## 2023-09-21 RX ADMIN — Medication 10 ML: at 21:55

## 2023-09-21 RX ADMIN — RANOLAZINE 500 MG: 500 TABLET, FILM COATED, EXTENDED RELEASE ORAL at 21:54

## 2023-09-21 RX ADMIN — TICAGRELOR 60 MG: 60 TABLET ORAL at 21:55

## 2023-09-21 RX ADMIN — GABAPENTIN 600 MG: 300 CAPSULE ORAL at 21:55

## 2023-09-21 RX ADMIN — METOPROLOL TARTRATE 25 MG: 25 TABLET, FILM COATED ORAL at 21:54

## 2023-09-21 NOTE — H&P
"    [facility]    Patient Identification:  Name:  Delmar Denise Sr.  Age:  63 y.o.  Sex:  male  :  1960  MRN:  [id number]   Admit Date: 2023   Visit Number:  [id number]  Room number:  404/04  Primary Care Physician:  Arnulfo Leiva APRN     Subjective     Chief complaint:    Chief Complaint   Patient presents with    Chest Pain       History of presenting illness:   Patient is a 63-year-old male with history significant for coronary artery disease status post PCI to RCA in 2022, hyperlipidemia who comes to the ER with complaints of back pain.  He says in 2022 when he received PCI to RCA, his symptoms started out as back pain between his shoulder blades with radiation down the left arm.  These were intermittent at that time for approximately 2 weeks before eventually it did progress to overt chest pain resulting in left heart catheterization and PCI.  He says over the last few days he has been having this \"achy back pain \"located between his shoulder blades with radiation on the left arm similar to prior MI.  He says these last sometimes for hours before subsiding.  He denies any dizziness/lightheadedness, nausea/vomiting or diaphoresis.  Pain does not necessarily worsen with exertion.  Given these persistent symptoms and similarity to prior symptoms resulting in the PCI, he contacted outpatient cardiology office today.  He was instructed to present to the ER for further evaluation.    In the ER, initial troponin 22, repeat 18 with a delta of -4.  EKG without ischemic changes.  Last echocardiogram 2022 noted an EF of 51 to 55% with diastolic dysfunction.  Left heart catheterization on 2022 noted 100% occlusion of the RCA status post PCI, 40% stenosis of the LAD, 40 to 50% stenosis of the left circumflex.  He has been compliant with beta-blockade, aspirin, high intensity statin and " Brilinta.    ---------------------------------------------------------------------------------------------------------------------   Review of Systems   Constitutional:  Negative for chills, fatigue and fever.   HENT:  Negative for congestion, sinus pain and sore throat.    Respiratory:  Negative for cough, chest tightness, shortness of breath and wheezing.    Cardiovascular:  Positive for chest pain. Negative for palpitations and leg swelling.   Gastrointestinal:  Negative for abdominal pain, constipation, diarrhea, nausea and vomiting.   Genitourinary:  Negative for dysuria, frequency, hematuria and urgency.   Musculoskeletal:  Positive for back pain. Negative for arthralgias and myalgias.   Neurological:  Negative for dizziness, numbness and headaches.   Psychiatric/Behavioral:  Negative for confusion.    ---------------------------------------------------------------------------------------------------------------------   Past Medical History:   Diagnosis Date    Arthritis     Back pain     Coronary artery disease involving native coronary artery of native heart without angina pectoris s/p RCA PCI in 6/2022 8/6/2022    Degenerative disc disease, lumbar     Mixed hyperlipidemia 8/6/2022     Past Surgical History:   Procedure Laterality Date    ABDOMINAL SURGERY      CARDIAC CATHETERIZATION N/A 6/29/2022    Procedure: Left Heart Cath;  Surgeon: Donovan Swartz MD;  Location: [facility] CATH INVASIVE LOCATION;  Service: Cardiology;  Laterality: N/A;    CARDIAC CATHETERIZATION N/A 6/29/2022    Procedure: Percutaneous Coronary Intervention;  Surgeon: Donovan Swartz MD;  Location: [facility] CATH INVASIVE LOCATION;  Service: Cardiology;  Laterality: N/A;    COLONOSCOPY      HEAD/NECK LESION/CYST EXCISION Left 8/25/2020    Procedure: Excision lipoma left posterior scalp;  Surgeon: Sravani Celaya MD;  Location: [facility] OR;  Service: General;  Laterality: Left;    JOINT REPLACEMENT Left     KNEE  SURGERY Bilateral     KNEE SURGERY      bilateraly    SHOULDER SURGERY      SHOULDER SURGERY      bilateral     Family History   Adopted: Yes   Family history unknown: Yes     Social History     Socioeconomic History    Marital status:    Tobacco Use    Smoking status: Never    Smokeless tobacco: Former     Types: Snuff   Vaping Use    Vaping Use: Never used   Substance and Sexual Activity    Alcohol use: Yes     Comment: occasionally    Drug use: No    Sexual activity: Defer     Partners: Female     Birth control/protection: None     ---------------------------------------------------------------------------------------------------------------------   Allergies:  Patient has no known allergies.  ---------------------------------------------------------------------------------------------------------------------   Medications below are reported home medications pulling from within the system; at this time, these medications have not been reconciled unless otherwise specified and are in the verification process for further verifcation as current home medications.    Prior to Admission Medications       Prescriptions Last Dose Informant Patient Reported? Taking?    aspirin 81 MG EC tablet   No No    Take 1 tablet by mouth Daily.    atorvastatin (LIPITOR) 40 MG tablet   No No    TAKE ONE TABLET BY MOUTH ONCE NIGHTLY    cyclobenzaprine (FLEXERIL) 10 MG tablet  Pharmacy Yes No    Take 10 mg by mouth 2 (Two) Times a Day As Needed for Muscle Spasms.    gabapentin (NEURONTIN) 300 MG capsule  Pharmacy Yes No    Take 2 capsules by mouth 2 (Two) Times a Day.    Patient not taking:  Reported on 6/27/2023    gabapentin (NEURONTIN) 600 MG tablet   Yes No    Take 1 tablet by mouth 2 (Two) Times a Day.    losartan (COZAAR) 50 MG tablet  Pharmacy Yes No    Take 1 tablet by mouth Daily.    meloxicam (MOBIC) 15 MG tablet  Pharmacy Yes No    Take 15 mg by mouth Daily.    metoprolol tartrate (LOPRESSOR) 25 MG tablet   No No     Take 1 tablet by mouth Every 12 (Twelve) Hours.    ticagrelor (Brilinta) 60 MG tablet tablet   No No    Take 1 tablet by mouth 2 (Two) Times a Day.    traMADol (ULTRAM) 50 MG tablet  Pharmacy Yes No    Take 50 mg by mouth 2 (Two) Times a Day As Needed for Moderate Pain .          Objective     Vital Signs:  Temp:  [98.4 °F (36.9 °C)] 98.4 °F (36.9 °C)  Heart Rate:  [71-82] 74  Resp:  [16] 16  BP: (100-121)/(52-84) 104/52    Mean Arterial Pressure (Non-Invasive) for the past 24 hrs (Last 3 readings):   Noninvasive MAP (mmHg)   09/21/23 1700 68   09/21/23 1645 91   09/21/23 1630 75     SpO2:  [93 %-100 %] 93 %  on   ;   Device (Oxygen Therapy): room air  Body mass index is 35.62 kg/m².    Wt Readings from Last 3 Encounters:   09/21/23 122 kg (270 lb)   06/27/23 126 kg (277 lb 12.8 oz)   02/08/23 130 kg (286 lb 3.2 oz)      ---------------------------------------------------------------------------------------------------------------------   Physical Exam:  Constitutional: older male, nontoxic, speaking clearly in full sentences, well-developed and well-nourished.  No respiratory distress.      HENT:  Head: Normocephalic and atraumatic.  Mouth:  Moist mucous membranes.    Eyes:  Conjunctivae and EOM are normal.  No scleral icterus.  Neck:  Neck supple.  No JVD present.    Cardiovascular:  Normal rate, regular rhythm and normal heart sounds with no murmur.  Pulmonary/Chest:  No respiratory distress, no wheezes, no crackles, with normal breath sounds and good air movement.  Abdominal:  Soft.  Bowel sounds are normal.  No distension and no tenderness.   Musculoskeletal:  No tenderness, and no deformity.  No red or swollen joints anywhere.    Neurological:  Alert and oriented to person, place, and time.  No cranial nerve deficit.  No tongue deviation.  No facial droop.  No slurred speech.   Skin:  Skin is warm and dry.  No rash noted.  No pallor.   Peripheral vascular:  No edema and pulses on all 4  extremities.    ---------------------------------------------------------------------------------------------------------------------  EKG: Normal sinus rhythm, rate 71, QTc 462, no acute ST or T wave changes    ECG 12 Lead ED Triage Standing Order; Chest Pain   Preliminary Result   Test Reason : ED Triage Standing Order~   Blood Pressure :   */*   mmHG   Vent. Rate :  71 BPM     Atrial Rate :  71 BPM      P-R Int : 180 ms          QRS Dur :  94 ms       QT Int : 426 ms       P-R-T Axes :  59 -13   5 degrees      QTc Int : 462 ms      Normal sinus rhythm   Voltage criteria for left ventricular hypertrophy   Abnormal ECG   When compared with ECG of 30-JUN-2022 07:34,   T wave inversion less evident in Inferior leads   Nonspecific T wave abnormality no longer evident in Lateral leads      Referred By: CAYDEN           Confirmed By:           Telemetry: Reviewed    I have personally looked at both the EKG and the telemetry strips.    Last echocardiogram:  Results for orders placed during the hospital encounter of 06/29/22    Adult Transthoracic Echo Complete W/ Cont if Necessary Per Protocol    Interpretation Summary  · The study is technically suboptimal for diagnosis with poor acoustic windows in the parasternal window, apical window and subcostal window. The quality of the study is limited due to patient body habitus  · Left ventricular ejection fraction appears to be 51 - 55%. Left ventricular systolic function is normal.  · Left ventricular diastolic function is consistent with (grade I) impaired relaxation.  · There is no evidence of pericardial effusion    --------------------------------------------------------------------------------------------------------------------  Labs:  Results from last 7 days   Lab Units 09/21/23  1232   WBC 10*3/mm3 8.68   HEMOGLOBIN g/dL 13.4   HEMATOCRIT % 41.3   MCV fL 88.2   MCHC g/dL 32.4   PLATELETS 10*3/mm3 230         Results from last 7 days   Lab Units 09/21/23  1232    SODIUM mmol/L 139   POTASSIUM mmol/L 3.8   CHLORIDE mmol/L 104   CO2 mmol/L 24.5   BUN mg/dL 22   CREATININE mg/dL 1.22   CALCIUM mg/dL 9.3   GLUCOSE mg/dL 104*   ALBUMIN g/dL 4.2   BILIRUBIN mg/dL 0.7   ALK PHOS U/L 90   AST (SGOT) U/L 22   ALT (SGPT) U/L 18   Estimated Creatinine Clearance: 83.7 mL/min (by C-G formula based on SCr of 1.22 mg/dL).    No results found for: AMMONIA  Results from last 7 days   Lab Units 09/21/23  1439 09/21/23  1232   HSTROP T ng/L 18* 22*         No results found for: HGBA1C, POCGLU  Lab Results   Component Value Date    TSH 4.540 (H) 06/29/2022    FREET4 1.35 06/29/2022     No results found for: PREGTESTUR, PREGSERUM, HCG, HCGQUANT  Pain Management Panel          Latest Ref Rng & Units 6/29/2022   Pain Management Panel   Amphetamine, Urine Qual Negative Negative    Barbiturates Screen, Urine Negative Negative    Benzodiazepine Screen, Urine Negative Negative    Buprenorphine, Screen, Urine Negative Negative    Cocaine Screen, Urine Negative Negative    Methadone Screen , Urine Negative Negative    Methamphetamine, Ur Negative Negative      Brief Urine Lab Results       None          No results found for: BLOODCX  No results found for: URINECX  No results found for: WOUNDCX  No results found for: STOOLCX    I have personally looked at the labs and they are summarized above.  ----------------------------------------------------------------------------------------------------------------------  Detailed radiology reports for the last 24 hours:    Imaging Results (Last 24 Hours)       Procedure Component Value Units Date/Time    XR Chest AP [[id number]] Collected: 09/21/23 1506     Updated: 09/21/23 1509    Narrative:      EXAM:    XR Chest, 1 View     EXAM DATE:    9/21/2023 3:20 PM     CLINICAL HISTORY:    Chest Pain Triage Protocol     TECHNIQUE:    Frontal view of the chest.     COMPARISON:    6/29/2022     FINDINGS:    Lungs and pleural spaces:  Unremarkable as visualized.   No  consolidation.  No pneumothorax.    Heart:  Unremarkable as visualized.  No cardiomegaly.    Mediastinum:  Unremarkable as visualized.    Bones/joints:  Unremarkable as visualized.       Impression:        Unremarkable exam. No acute cardiopulmonary findings identified.        This report was finalized on 9/21/2023 3:07 PM by Dr. Nathan Flores MD.             Final impressions for the last 30 days of radiology reports:    XR Chest AP    Result Date: 9/21/2023    Unremarkable exam. No acute cardiopulmonary findings identified.   This report was finalized on 9/21/2023 3:07 PM by Dr. Nathan Flores MD.      XR Spine Lumbar Complete 4+VW    Result Date: 9/5/2023    Degenerative changes lumbar spine. No acute findings.  This report was finalized on 9/5/2023 1:52 PM by Dr. Nathan Flores MD.     I have personally looked at the radiology images and read the final radiology report.    Assessment & Plan      Patient is a 63-year-old male with history significant for coronary artery disease status post PCI to RCA in June 2022, hyperlipidemia who comes to the ER with complaints of back pain with radiation down the back left arm.    #Unstable angina  #Coronary artery disease status post PCI to RCA  --Patient presented w/ back pain between the shoulder blades with radiation down the left arm.  --Admission labs showed initial troponin 22, repeat 18 with a delta of negative for  --EKG without ischemic changes  --Last echocardiogram 6/29/2022 noted an EF of 51 to 55% with diastolic dysfunction  --Left heart catheterization on 6/29/2022 noted 100% occlusion of the RCA status post PCI, 40% stenosis of the LAD, 40 to 50% stenosis of the left circumflex.   --Echocardiogram ordered to evaluate systolic and diastolic function  --Check A1c/lipid panel/TSH  --As needed sublingual nitro if recurrent chest pain, can add Nitropaste if needed  --Start Ranexa 500 mg twice daily, add Imdur  --Continue metoprolol tartrate, aspirin/high  intensity statin, Brilinta twice daily  --N.p.o. at midnight with tentative plans for stress test versus left heart cath  --Cardiology consulted, appreciate recommendations  --Admit for observation to telemetry    #Essential hypertension, DC losartan  #Chronic pain syndrome, resume gabapentin, Norco  #Hyperlipidemia, high intensity statin     Checklist:  Antibiotics: None  Steroids: None  DVT ppx: lovenox  GI ppx: ppi  Diet: Consistent carb  Code: CPR, full  Risk/dispo: Patient is a high risk due to unstable angina requiring further work-up.  Anticipate 24 to 48-hour stay pending clinical course.  Disposition expected Home when medically stable.    Heraclio Miller DO  [facility]  09/21/23  17:41 EDT

## 2023-09-22 ENCOUNTER — APPOINTMENT (OUTPATIENT)
Dept: NUCLEAR MEDICINE | Facility: HOSPITAL | Age: 63
End: 2023-09-22
Payer: MEDICARE

## 2023-09-22 ENCOUNTER — APPOINTMENT (OUTPATIENT)
Dept: CARDIOLOGY | Facility: HOSPITAL | Age: 63
End: 2023-09-22
Payer: MEDICARE

## 2023-09-22 VITALS
RESPIRATION RATE: 18 BRPM | HEART RATE: 65 BPM | TEMPERATURE: 98 F | DIASTOLIC BLOOD PRESSURE: 56 MMHG | SYSTOLIC BLOOD PRESSURE: 92 MMHG | OXYGEN SATURATION: 98 % | WEIGHT: 275.6 LBS | BODY MASS INDEX: 36.53 KG/M2 | HEIGHT: 73 IN

## 2023-09-22 LAB
ANION GAP SERPL CALCULATED.3IONS-SCNC: 6.3 MMOL/L (ref 5–15)
BASOPHILS # BLD AUTO: 0.03 10*3/MM3 (ref 0–0.2)
BASOPHILS NFR BLD AUTO: 0.4 % (ref 0–1.5)
BH CV ECHO MEAS - ACS: 1.7 CM
BH CV ECHO MEAS - AO MAX PG: 6.2 MMHG
BH CV ECHO MEAS - AO MEAN PG: 3 MMHG
BH CV ECHO MEAS - AO ROOT DIAM: 3.4 CM
BH CV ECHO MEAS - AO V2 MAX: 124 CM/SEC
BH CV ECHO MEAS - AO V2 VTI: 26.8 CM
BH CV ECHO MEAS - EDV(CUBED): 152.7 ML
BH CV ECHO MEAS - EDV(MOD-SP4): 129 ML
BH CV ECHO MEAS - EF(MOD-SP4): 61.6 %
BH CV ECHO MEAS - ESV(CUBED): 44.6 ML
BH CV ECHO MEAS - ESV(MOD-SP4): 49.5 ML
BH CV ECHO MEAS - FS: 33.7 %
BH CV ECHO MEAS - IVS/LVPW: 0.88 CM
BH CV ECHO MEAS - IVSD: 0.96 CM
BH CV ECHO MEAS - LA DIMENSION: 3.4 CM
BH CV ECHO MEAS - LAT PEAK E' VEL: 14.9 CM/SEC
BH CV ECHO MEAS - LV DIASTOLIC VOL/BSA (35-75): 52.4 CM2
BH CV ECHO MEAS - LV MASS(C)D: 209.5 GRAMS
BH CV ECHO MEAS - LV SYSTOLIC VOL/BSA (12-30): 20.1 CM2
BH CV ECHO MEAS - LVIDD: 5.3 CM
BH CV ECHO MEAS - LVIDS: 3.5 CM
BH CV ECHO MEAS - LVOT AREA: 4.5 CM2
BH CV ECHO MEAS - LVOT DIAM: 2.4 CM
BH CV ECHO MEAS - LVPWD: 1.09 CM
BH CV ECHO MEAS - MED PEAK E' VEL: 7 CM/SEC
BH CV ECHO MEAS - MV A MAX VEL: 78.4 CM/SEC
BH CV ECHO MEAS - MV E MAX VEL: 57.8 CM/SEC
BH CV ECHO MEAS - MV E/A: 0.74
BH CV ECHO MEAS - PA ACC TIME: 0.07 SEC
BH CV ECHO MEAS - SI(MOD-SP4): 32.3 ML/M2
BH CV ECHO MEAS - SV(MOD-SP4): 79.5 ML
BH CV ECHO MEAS - TAPSE (>1.6): 3.2 CM
BH CV ECHO MEASUREMENTS AVERAGE E/E' RATIO: 5.28
BH CV NUCLEAR PRIOR STUDY: 3
BH CV REST NUCLEAR ISOTOPE DOSE: 9.6 MCI
BH CV STRESS BP STAGE 1: NORMAL
BH CV STRESS BP STAGE 2: NORMAL
BH CV STRESS DURATION MIN STAGE 1: 3
BH CV STRESS DURATION MIN STAGE 2: 3
BH CV STRESS DURATION MIN STAGE 3: 3
BH CV STRESS DURATION SEC STAGE 1: 0
BH CV STRESS DURATION SEC STAGE 2: 0
BH CV STRESS DURATION SEC STAGE 3: 0
BH CV STRESS GRADE STAGE 1: 10
BH CV STRESS GRADE STAGE 2: 12
BH CV STRESS GRADE STAGE 3: 14
BH CV STRESS HR STAGE 1: 102
BH CV STRESS HR STAGE 2: 122
BH CV STRESS HR STAGE 3: 133
BH CV STRESS METS STAGE 1: 5
BH CV STRESS METS STAGE 2: 7.5
BH CV STRESS METS STAGE 3: 10
BH CV STRESS NUCLEAR ISOTOPE DOSE: 27.6 MCI
BH CV STRESS PROTOCOL 1: NORMAL
BH CV STRESS RECOVERY BP: NORMAL MMHG
BH CV STRESS RECOVERY HR: 86 BPM
BH CV STRESS SPEED STAGE 1: 1.7
BH CV STRESS SPEED STAGE 2: 2.5
BH CV STRESS SPEED STAGE 3: 3.4
BH CV STRESS STAGE 1: 1
BH CV STRESS STAGE 2: 2
BH CV STRESS STAGE 3: 3
BUN SERPL-MCNC: 24 MG/DL (ref 8–23)
BUN/CREAT SERPL: 24.5 (ref 7–25)
CALCIUM SPEC-SCNC: 9.1 MG/DL (ref 8.6–10.5)
CHLORIDE SERPL-SCNC: 107 MMOL/L (ref 98–107)
CO2 SERPL-SCNC: 26.7 MMOL/L (ref 22–29)
CREAT SERPL-MCNC: 0.98 MG/DL (ref 0.76–1.27)
DEPRECATED RDW RBC AUTO: 48.1 FL (ref 37–54)
EGFRCR SERPLBLD CKD-EPI 2021: 86.1 ML/MIN/1.73
EOSINOPHIL # BLD AUTO: 0.05 10*3/MM3 (ref 0–0.4)
EOSINOPHIL NFR BLD AUTO: 0.7 % (ref 0.3–6.2)
ERYTHROCYTE [DISTWIDTH] IN BLOOD BY AUTOMATED COUNT: 14.7 % (ref 12.3–15.4)
GLUCOSE SERPL-MCNC: 109 MG/DL (ref 65–99)
HCT VFR BLD AUTO: 37.6 % (ref 37.5–51)
HGB BLD-MCNC: 11.9 G/DL (ref 13–17.7)
IMM GRANULOCYTES # BLD AUTO: 0.02 10*3/MM3 (ref 0–0.05)
IMM GRANULOCYTES NFR BLD AUTO: 0.3 % (ref 0–0.5)
LEFT ATRIUM VOLUME INDEX: 15.4 ML/M2
LV EF NUC BP: 60 %
LYMPHOCYTES # BLD AUTO: 2.31 10*3/MM3 (ref 0.7–3.1)
LYMPHOCYTES NFR BLD AUTO: 31.1 % (ref 19.6–45.3)
MAGNESIUM SERPL-MCNC: 2.1 MG/DL (ref 1.6–2.4)
MAXIMAL PREDICTED HEART RATE: 156 BPM
MCH RBC QN AUTO: 28.4 PG (ref 26.6–33)
MCHC RBC AUTO-ENTMCNC: 31.6 G/DL (ref 31.5–35.7)
MCV RBC AUTO: 89.7 FL (ref 79–97)
MONOCYTES # BLD AUTO: 0.51 10*3/MM3 (ref 0.1–0.9)
MONOCYTES NFR BLD AUTO: 6.9 % (ref 5–12)
NEUTROPHILS NFR BLD AUTO: 4.5 10*3/MM3 (ref 1.7–7)
NEUTROPHILS NFR BLD AUTO: 60.6 % (ref 42.7–76)
NRBC BLD AUTO-RTO: 0 /100 WBC (ref 0–0.2)
PERCENT MAX PREDICTED HR: 85.26 %
PLATELET # BLD AUTO: 189 10*3/MM3 (ref 140–450)
PMV BLD AUTO: 8.8 FL (ref 6–12)
POTASSIUM SERPL-SCNC: 4.2 MMOL/L (ref 3.5–5.2)
QT INTERVAL: 426 MS
QTC INTERVAL: 462 MS
RBC # BLD AUTO: 4.19 10*6/MM3 (ref 4.14–5.8)
SODIUM SERPL-SCNC: 140 MMOL/L (ref 136–145)
STRESS BASELINE BP: NORMAL MMHG
STRESS BASELINE HR: 70 BPM
STRESS PERCENT HR: 100 %
STRESS POST ESTIMATED WORKLOAD: 9.5 METS
STRESS POST EXERCISE DUR MIN: 6 MIN
STRESS POST EXERCISE DUR SEC: 49 SEC
STRESS POST PEAK BP: NORMAL MMHG
STRESS POST PEAK HR: 133 BPM
STRESS TARGET HR: 133 BPM
TROPONIN T SERPL HS-MCNC: 14 NG/L
WBC NRBC COR # BLD: 7.42 10*3/MM3 (ref 3.4–10.8)

## 2023-09-22 PROCEDURE — 97161 PT EVAL LOW COMPLEX 20 MIN: CPT

## 2023-09-22 PROCEDURE — 83735 ASSAY OF MAGNESIUM: CPT | Performed by: STUDENT IN AN ORGANIZED HEALTH CARE EDUCATION/TRAINING PROGRAM

## 2023-09-22 PROCEDURE — 84484 ASSAY OF TROPONIN QUANT: CPT | Performed by: STUDENT IN AN ORGANIZED HEALTH CARE EDUCATION/TRAINING PROGRAM

## 2023-09-22 PROCEDURE — 78452 HT MUSCLE IMAGE SPECT MULT: CPT

## 2023-09-22 PROCEDURE — 85025 COMPLETE CBC W/AUTO DIFF WBC: CPT | Performed by: STUDENT IN AN ORGANIZED HEALTH CARE EDUCATION/TRAINING PROGRAM

## 2023-09-22 PROCEDURE — 93306 TTE W/DOPPLER COMPLETE: CPT | Performed by: INTERNAL MEDICINE

## 2023-09-22 PROCEDURE — G0378 HOSPITAL OBSERVATION PER HR: HCPCS

## 2023-09-22 PROCEDURE — 80048 BASIC METABOLIC PNL TOTAL CA: CPT | Performed by: STUDENT IN AN ORGANIZED HEALTH CARE EDUCATION/TRAINING PROGRAM

## 2023-09-22 PROCEDURE — 97165 OT EVAL LOW COMPLEX 30 MIN: CPT

## 2023-09-22 PROCEDURE — 93306 TTE W/DOPPLER COMPLETE: CPT

## 2023-09-22 PROCEDURE — 78452 HT MUSCLE IMAGE SPECT MULT: CPT | Performed by: INTERNAL MEDICINE

## 2023-09-22 PROCEDURE — 99214 OFFICE O/P EST MOD 30 MIN: CPT | Performed by: INTERNAL MEDICINE

## 2023-09-22 PROCEDURE — 93018 CV STRESS TEST I&R ONLY: CPT | Performed by: INTERNAL MEDICINE

## 2023-09-22 PROCEDURE — A9500 TC99M SESTAMIBI: HCPCS | Performed by: STUDENT IN AN ORGANIZED HEALTH CARE EDUCATION/TRAINING PROGRAM

## 2023-09-22 PROCEDURE — 0 TECHNETIUM SESTAMIBI: Performed by: STUDENT IN AN ORGANIZED HEALTH CARE EDUCATION/TRAINING PROGRAM

## 2023-09-22 PROCEDURE — 93017 CV STRESS TEST TRACING ONLY: CPT

## 2023-09-22 RX ORDER — NITROGLYCERIN 0.4 MG/1
0.4 TABLET SUBLINGUAL
Qty: 30 TABLET | Refills: 12 | Status: SHIPPED | OUTPATIENT
Start: 2023-09-22

## 2023-09-22 RX ORDER — LOSARTAN POTASSIUM 25 MG/1
25 TABLET ORAL DAILY
Qty: 30 TABLET | Refills: 0 | Status: SHIPPED | OUTPATIENT
Start: 2023-09-22 | End: 2023-10-22

## 2023-09-22 RX ORDER — ATORVASTATIN CALCIUM 80 MG/1
80 TABLET, FILM COATED ORAL NIGHTLY
Qty: 30 TABLET | Refills: 3 | Status: SHIPPED | OUTPATIENT
Start: 2023-09-22 | End: 2024-01-20

## 2023-09-22 RX ORDER — RANOLAZINE 500 MG/1
500 TABLET, EXTENDED RELEASE ORAL EVERY 12 HOURS SCHEDULED
Qty: 60 TABLET | Refills: 0 | Status: SHIPPED | OUTPATIENT
Start: 2023-09-22

## 2023-09-22 RX ORDER — ISOSORBIDE MONONITRATE 30 MG/1
30 TABLET, EXTENDED RELEASE ORAL
Qty: 30 TABLET | Refills: 0 | Status: SHIPPED | OUTPATIENT
Start: 2023-09-23

## 2023-09-22 RX ADMIN — GABAPENTIN 600 MG: 300 CAPSULE ORAL at 09:54

## 2023-09-22 RX ADMIN — TECHNETIUM TC 99M SESTAMIBI 1 DOSE: 1 INJECTION INTRAVENOUS at 13:48

## 2023-09-22 RX ADMIN — RANOLAZINE 500 MG: 500 TABLET, FILM COATED, EXTENDED RELEASE ORAL at 09:54

## 2023-09-22 RX ADMIN — ISOSORBIDE MONONITRATE 30 MG: 30 TABLET, EXTENDED RELEASE ORAL at 09:54

## 2023-09-22 RX ADMIN — METOPROLOL TARTRATE 25 MG: 25 TABLET, FILM COATED ORAL at 09:54

## 2023-09-22 RX ADMIN — Medication 10 ML: at 09:55

## 2023-09-22 RX ADMIN — LOSARTAN POTASSIUM 25 MG: 25 TABLET, FILM COATED ORAL at 09:54

## 2023-09-22 RX ADMIN — TICAGRELOR 60 MG: 60 TABLET ORAL at 09:54

## 2023-09-22 RX ADMIN — ASPIRIN 81 MG: 81 TABLET, COATED ORAL at 09:54

## 2023-09-22 RX ADMIN — TECHNETIUM TC 99M SESTAMIBI 1 DOSE: 1 INJECTION INTRAVENOUS at 12:25

## 2023-09-22 NOTE — DISCHARGE SUMMARY
[facility]PITAL[facility] DISCHARGE SUMMARY    Patient Identification:  Name:  Delmar Denise Sr.  Age:  63 y.o.  Sex:  male  :  1960  MRN:  [id number]  Visit Number:  [id number]    Date of Admission: 2023  Date of Discharge:  2023     PCP: Arnulfo Leiva APRN    DISCHARGE DIAGNOSIS  #Unstable angina with atypical features  #Coronary artery disease status post PCI to RCA  #HFpEF, not in acute exacerbation  #Essential hypertension  #Chronic pain syndrome  #Hyperlipidemia    CONSULTS   Cardiology    PROCEDURES PERFORMED  Stress test- negative    HOSPITAL COURSE  Patient is a 63 y.o. male presented to [facility] complaining of back pain with radiations down the back of the left arm.  Please see the admitting history and physical for further details.  Patient's symptoms were atypical chest pain however earlier this year in January when he underwent left heart catheterization and PCI to RCA, his symptoms presented similarly which raise concern and the reason for presentation to the ER.  Initial troponin was 22, repeat 18 with a delta of negative 4.  Echocardiogram noted an EF of 51 to 55% with diastolic dysfunction.  Cardiology was consulted, patient underwent ischemic stress test which was unremarkable.  He was started on Ranexa and Imdur was added.  Losartan was decreased to 25 mg daily.  Patient did well throughout the short observation period and did not have any further recurrence of atypical symptoms.  He was provided reassurance and discussed medication changes above.  He was continued on metoprolol tartrate, aspirin with increased atorvastatin to 80 mg at bedtime with instructions to follow-up with PCP in 1 week and cardiology outpatient as noted below.  Patient voiced understanding and agreement to treatment plan.    VITAL SIGNS:  Temp:  [97.7 °F (36.5 °C)-98.3 °F (36.8 °C)] 98 °F (36.7 °C)  Heart Rate:  [60-80] 64  Resp:  [18] 18  BP: (104-140)/(52-80)  120/80  SpO2:  [93 %-100 %] 98 %  on   ;   Device (Oxygen Therapy): room air    Body mass index is 36.36 kg/m².  Wt Readings from Last 3 Encounters:   09/22/23 125 kg (275 lb 9.6 oz)   06/27/23 126 kg (277 lb 12.8 oz)   02/08/23 130 kg (286 lb 3.2 oz)       PHYSICAL EXAM:  Constitutional:  Well-developed and well-nourished.  No respiratory distress.      HENT:  Head:  Normocephalic and atraumatic.  Mouth:  Moist mucous membranes.    Eyes:  Conjunctivae and EOM are normal.  No scleral icterus.    Neck:  Neck supple.  No JVD present.    Cardiovascular:  Normal rate, regular rhythm and normal heart sounds with no murmur.  Pulmonary/Chest:  No respiratory distress, no wheezes, no crackles, with normal breath sounds and good air movement.  Abdominal:  Soft. No distension and no tenderness.   Musculoskeletal:  No tenderness, and no deformity.  No red or swollen joints anywhere.    Neurological:  Alert and oriented to person, place, and time.  No cranial nerve deficit.    Skin:  Skin is warm and dry. No rash noted. No pallor.   Peripheral vascular: no clubbing, no cyanosis, no edema.    DISCHARGE DISPOSITION   Stable    DISCHARGE MEDICATIONS:     Discharge Medications        New Medications        Instructions Start Date   isosorbide mononitrate 30 MG 24 hr tablet  Commonly known as: IMDUR   30 mg, Oral, Every 24 Hours Scheduled   Start Date: September 23, 2023     nitroglycerin 0.4 MG SL tablet  Commonly known as: NITROSTAT   0.4 mg, Sublingual, Every 5 Minutes PRN, Take no more than 3 doses in 15 minutes.      ranolazine 500 MG 12 hr tablet  Commonly known as: RANEXA   500 mg, Oral, Every 12 Hours Scheduled             Changes to Medications        Instructions Start Date   atorvastatin 80 MG tablet  Commonly known as: LIPITOR  What changed:   medication strength  how much to take   80 mg, Oral, Nightly      losartan 25 MG tablet  Commonly known as: COZAAR  What changed:   medication strength  how much to take   25 mg,  Oral, Daily             Continue These Medications        Instructions Start Date   Aspirin Low Dose 81 MG EC tablet  Generic drug: aspirin   81 mg, Oral, Daily      gabapentin 600 MG tablet  Commonly known as: NEURONTIN   600 mg, Oral, 2 Times Daily      gabapentin 600 MG tablet  Commonly known as: NEURONTIN   600 mg, Oral, 2 Times Daily      HYDROcodone-acetaminophen 7.5-325 MG per tablet  Commonly known as: NORCO   1 tablet, Oral, Nightly      metoprolol tartrate 25 MG tablet  Commonly known as: LOPRESSOR   25 mg, Oral, Every 12 Hours Scheduled      ticagrelor 60 MG tablet tablet  Commonly known as: Brilinta   60 mg, Oral, 2 Times Daily                 Additional Instructions for the Follow-ups that You Need to Schedule       Discharge Follow-up with PCP   As directed       Currently Documented PCP:    Arnulfo Leiva APRN    PCP Phone Number:    [phone]     Follow Up Details: [address] [place] [address] fu        Discharge Follow-up with Specialty: raymond; 1 Month   As directed      Specialty: raymond   Follow Up: 1 Month   Follow Up Details: recent neg stress, s/p PCI in JAN               Follow-up Information       Arnulfo Leiva APRN .    Specialty: Nurse Practitioner  Why: [address] [place] [address] fu  Contact information:  [address] [place] [address]  [phone]                              TEST  RESULTS PENDING AT DISCHARGE  none     CODE STATUS  Code Status and Medical Interventions:   Ordered at: 09/21/23 1741     Code Status (Patient has no pulse and is not breathing):    CPR (Attempt to Resuscitate)     Medical Interventions (Patient has pulse or is breathing):    Full Support       The ASCVD Risk score (Audelia JERI, et al., 2019) failed to calculate for the following reasons:    The patient has a prior MI or stroke diagnosis     Heraclio Miller DO  [facility]  09/22/23  16:54 EDT    Please note that this discharge summary required more than 30 minutes to  complete.

## 2023-09-22 NOTE — THERAPY EVALUATION
Acute Care - Physical Therapy Initial Evaluation  [facility]     Patient Name: Delmar Denise Sr.  : 1960  MRN: [id number]  Today's Date: 2023      Visit Dx:   No diagnosis found.  Patient Active Problem List   Diagnosis    Suture check    Pre-op testing    Lipoma of head    NSTEMI (non-ST elevated myocardial infarction)    Mixed hyperlipidemia    Primary hypertension    Coronary artery disease involving native coronary artery of native heart without angina pectoris s/p RCA PCI in 2022     Unstable angina     Past Medical History:   Diagnosis Date    Arthritis     Back pain     Coronary artery disease involving native coronary artery of native heart without angina pectoris s/p RCA PCI in 2022    Degenerative disc disease, lumbar     Mixed hyperlipidemia 2022     Past Surgical History:   Procedure Laterality Date    ABDOMINAL SURGERY      CARDIAC CATHETERIZATION N/A 2022    Procedure: Left Heart Cath;  Surgeon: Donovan Swartz MD;  Location: [facility] INVASIVE LOCATION;  Service: Cardiology;  Laterality: N/A;    CARDIAC CATHETERIZATION N/A 2022    Procedure: Percutaneous Coronary Intervention;  Surgeon: Donovan Swartz MD;  Location: [facility] INVASIVE LOCATION;  Service: Cardiology;  Laterality: N/A;    COLONOSCOPY      HEAD/NECK LESION/CYST EXCISION Left 2020    Procedure: Excision lipoma left posterior scalp;  Surgeon: Sravani Celaya MD;  Location: [facility];  Service: General;  Laterality: Left;    JOINT REPLACEMENT Left     KNEE SURGERY Bilateral     KNEE SURGERY      bilateraly    SHOULDER SURGERY      SHOULDER SURGERY      bilateral     PT Assessment (last 12 hours)       PT Evaluation and Treatment       Row Name 23 1339          Physical Therapy Time and Intention    Subjective Information no complaints  -KM     Document Type evaluation  -KM     Mode of Treatment individual therapy;physical therapy  -KM     Patient Effort  good  -KM       Row Name 09/22/23 1339          General Information    Patient Profile Reviewed yes  -KM     Patient Observations alert;cooperative;agree to therapy  -KM     Prior Level of Function independent:;all household mobility;ADL's  -KM     Existing Precautions/Restrictions no known precautions/restrictions  -KM     Risks Reviewed patient:;LOB;nausea/vomiting;dizziness;increased discomfort  -KM     Benefits Reviewed patient:;improve function;increase independence;increase strength;increase balance  -KM     Barriers to Rehab none identified  -KM       Row Name 09/22/23 1339          Living Environment    Current Living Arrangements home  -KM     People in Home spouse  -KM     Primary Care Provided by self  -KM       Row Name 09/22/23 1339          Home Use of Assistive/Adaptive Equipment    Equipment Currently Used at Home none  -KM       Row Name 09/22/23 1339          Cognition    Affect/Mental Status (Cognition) WFL  -KM     Orientation Status (Cognition) oriented x 4  -KM     Follows Commands (Cognition) WFL  -KM       Row Name 09/22/23 1339          Range of Motion (ROM)    Range of Motion bilateral lower extremities;ROM is [facility] Name 09/22/23 1339          Strength (Manual Muscle Testing)    Strength (Manual Muscle Testing) bilateral lower extremities;strength is L  -KM       Row Name 09/22/23 1339          Bed Mobility    Bed Mobility bed mobility (all) activities  -KM     All Activities, [place] (Bed Mobility) independent  -KM       Row Name 09/22/23 1339          Transfers    Transfers sit-stand transfer;stand-sit transfer  -KM       Row Name 09/22/23 1339          Sit-Stand Transfer    Sit-Stand [place] (Transfers) independent  -KM       Row Name 09/22/23 1339          Stand-Sit Transfer    Stand-Sit [place] (Transfers) independent  -KM       Row Name 09/22/23 1339          Gait/Stairs (Locomotion)    Gait/Stairs Locomotion gait/ambulation independence;distance  ambulated  -KM     [place] Level (Gait) independent  -KM     Patient was able to Ambulate yes  -KM     Distance in Feet (Gait) 70  -KM     Pattern (Gait) swing-through  -KM       Row Name 09/22/23 6977          Balance    Balance Assessment sitting static balance;standing dynamic balance  -KM     Static Sitting Balance independent  -KM     Position, Sitting Balance sitting edge of bed  -KM     Dynamic Standing Balance independent  -KM       Row Name 09/22/23 2762          Plan of Care Review    Plan of Care Reviewed With patient;spouse  -KM     Outcome Evaluation Pt. evaluation completed during PT session. He was able to demonstrate functional mobility skills independently. He was able to ambulate w/ no AD. Pt. claims to be at baseline. PT signing off on pt. at this time d/t high functional mobility skills.  -KM       Row Name 09/22/23 3106          Therapy Assessment/Plan (PT)    Functional Level at Time of Evaluation (PT) independent  -KM     Criteria for Skilled Interventions Met (PT) no;no problems identified which require skilled intervention;does not meet criteria for skilled intervention  -KM     Therapy Frequency (PT) evaluation only  -KM       Row Name 09/22/23 9675          Therapy Plan Review/Discharge Plan (PT)    Therapy Plan Review (PT) evaluation/treatment results reviewed;patient;spouse/significant other  -KM               User Key  (r) = Recorded By, (t) = Taken By, (c) = Cosigned By      Initials Name Provider Type    Jim Chamberlain, PT Physical Therapist                    Physical Therapy Education       Title: PT OT SLP Therapies (Done)       Topic: Physical Therapy (Done)       Point: Mobility training (Done)       Learning Progress Summary             Patient Acceptance, E,TB, VU by NIXON at 9/22/2023 1342                         Point: Home exercise program (Done)       Learning Progress Summary             Patient Acceptance, E,TB, VU by NIXON at 9/22/2023 1342                          Point: Body mechanics (Done)       Learning Progress Summary             Patient Acceptance, E,TB, VU by  at 9/22/2023 1342                         Point: Precautions (Done)       Learning Progress Summary             Patient Acceptance, E,TB, VU by  at 9/22/2023 1342                                         User Key       Initials Effective Dates Name Provider Type [facility] 05/24/22 -  Jim Woodson, PT Physical Therapist PT                  PT Recommendation and Plan  Anticipated Discharge Disposition (PT): home  Therapy Frequency (PT): evaluation only  Plan of Care Reviewed With: patient, spouse  Outcome Evaluation: Pt. evaluation completed during PT session. He was able to demonstrate functional mobility skills independently. He was able to ambulate w/ no AD. Pt. claims to be at baseline. PT signing off on pt. at this time d/t high functional mobility skills.   Outcome Measures       Row Name 09/22/23 1105             Modified [place] Scale    Pre-Stroke Modified [place] Scale 1 - No significant disability despite symptoms.  Able to carry out all usual duties and activities.  -KR      Modified [place] Scale 1 - No significant disability despite symptoms.  Able to carry out all usual duties and activities.  -KR         Functional Assessment    Outcome Measure Options Modified [place]  -KR                User Key  (r) = Recorded By, (t) = Taken By, (c) = Cosigned By      Initials Name Provider Type    Nathan Sarmiento, OT Occupational Therapist                     Time Calculation:    PT Charges       Row Name 09/22/23 1330             Time Calculation    PT Received On 09/22/23  -                User Key  (r) = Recorded By, (t) = Taken By, (c) = Cosigned By      Initials Name Provider Type     Jim Woodson, PT Physical Therapist                  Therapy Charges for Today       Code Description Service Date Service Provider Modifiers Qty    57475102778 HC PT EVAL LOW COMPLEXITY 4 9/22/2023 Chintan  Jim, PT GP 1            PT G-Codes  Outcome Measure Options: Modified [place]  AM-PAC 6 Clicks Score (PT): 24  Modified [place] Scale: 1 - No significant disability despite symptoms.  Able to carry out all usual duties and activities.    Jim Woodson, PT  9/22/2023

## 2023-09-22 NOTE — CONSULTS
Consults  Date of Admit: 9/21/2023  Date of Consult: 09/22/23  Provider, No Known  Delmar Denise Sr.  1960    Consulting Physician: Donovan Swartz MD    Cardiology consultation    Reason for consultation: Unstable angina      History of Present Illness    Subjective     Chief Complaint   Patient presents with    Chest Pain       Delmar Denise Sr. is a 63 y.o. male with past medical history significant for coronary artery disease status post PCI to the RCA in June 2022, hyperlipidemia, and obesity.  He presented to the ER because 3 days ago he began to experiece pain between his shoulder blades that radiated into his left arm.  This is very similar to the pain he was having when he had his MI.  Reports that the pain improves with activity and actually worsens when he is at rest.  Reports that he has been compliant with his home medications to include aspirin, statin, and Brilinta.    He denies shortness of breath, palpitations, nausea, or diaphoresis.    Admission labs and studies significant for high-sensitivity troponin 22 and then subsequently 18.  Lipids extremely well controlled with an LDL of 12.  A1c 5.9.  EKG revealed normal sinus rhythm with nonspecific ST changes.    Last Echo: 09/22/2023  Interpretation Summary    Left ventricular systolic function is normal. Left ventricular ejection fraction appears to be 51 - 55%.    Left ventricular diastolic function is consistent with (grade I) impaired relaxation.    There is no evidence of pericardial effusion  Last Stress: 09/22/2023-results pending    Last Cath: 6/29/2022  Coronary anatomy findings:     LM: Is a large calibre vessel , normal take off from left cusp, divides into LAD and Lcx. No significant stenosis noted     LAD: Large calibre vessel, mid after origin of D1 had mild 40% stenosis, no other significant stenosis noted, D1 patent     LCX: Moderate calibre vessel, mild luminal irregularities. Large hign OM1 artery with no stenosis, small OM2  artery which had about 40-50% stenosis in proximal portion.     RCA: Large calibre, dominant artery, normal take off from right cusp.  100% occluded in proximal after take of conus branch s/p PCI with LILIAN 3 flow, mid had mild 30-40% stenosis, RPDA and PL branches had no stenosis. .      Left Ventriculography:     LV systolic function was mildly reduced  with visual estimated EF of 45-50% with inferior wall hypokinesia.   No significant mitral regurgitation noted.      LVEDP: 21 mmHg  No gradient across the aortic valve on pull back.     Cardiac risk factors:cerebral vascular disease, hypercholesterolemia, hypertension, and Obesity    Past Medical History:   Diagnosis Date    Arthritis     Back pain     Coronary artery disease involving native coronary artery of native heart without angina pectoris s/p RCA PCI in 6/2022 8/6/2022    Degenerative disc disease, lumbar     Mixed hyperlipidemia 8/6/2022     Past Surgical History:   Procedure Laterality Date    ABDOMINAL SURGERY      CARDIAC CATHETERIZATION N/A 6/29/2022    Procedure: Left Heart Cath;  Surgeon: Donovan Swartz MD;  Location: [facility] CATH INVASIVE LOCATION;  Service: Cardiology;  Laterality: N/A;    CARDIAC CATHETERIZATION N/A 6/29/2022    Procedure: Percutaneous Coronary Intervention;  Surgeon: Donovan Swartz MD;  Location: [facility] CATH INVASIVE LOCATION;  Service: Cardiology;  Laterality: N/A;    COLONOSCOPY      HEAD/NECK LESION/CYST EXCISION Left 8/25/2020    Procedure: Excision lipoma left posterior scalp;  Surgeon: Sravani Celaya MD;  Location: [facility] OR;  Service: General;  Laterality: Left;    JOINT REPLACEMENT Left     KNEE SURGERY Bilateral     KNEE SURGERY      bilateraly    SHOULDER SURGERY      SHOULDER SURGERY      bilateral     Family History   Adopted: Yes   Family history unknown: Yes     Social History     Tobacco Use    Smoking status: Never    Smokeless tobacco: Former     Types: Snuff   Vaping Use     Vaping Use: Never used   Substance Use Topics    Alcohol use: Yes     Comment: occasionally    Drug use: No     Medications Prior to Admission   Medication Sig Dispense Refill Last Dose    aspirin 81 MG EC tablet Take 1 tablet by mouth Daily. 90 tablet 0 9/21/2023    atorvastatin (LIPITOR) 40 MG tablet TAKE ONE TABLET BY MOUTH ONCE NIGHTLY 90 tablet 3 9/20/2023    gabapentin (NEURONTIN) 600 MG tablet Take 1 tablet by mouth 2 (Two) Times a Day.   9/21/2023 at am    HYDROcodone-acetaminophen (NORCO) 7.5-325 MG per tablet Take 1 tablet by mouth Every Night.   9/20/2023    losartan (COZAAR) 50 MG tablet Take 1 tablet by mouth Daily.   9/21/2023    metoprolol tartrate (LOPRESSOR) 25 MG tablet Take 1 tablet by mouth Every 12 (Twelve) Hours. 90 tablet 3 9/21/2023    ticagrelor (Brilinta) 60 MG tablet tablet Take 1 tablet by mouth 2 (Two) Times a Day. 180 tablet 3 9/21/2023    gabapentin (NEURONTIN) 600 MG tablet Take 1 tablet by mouth 2 (Two) Times a Day.        Allergies:  Patient has no known allergies.    Review of Systems   Constitutional:  Positive for fatigue. Negative for diaphoresis.   Respiratory:  Negative for shortness of breath.    Cardiovascular:  Positive for chest pain.   Gastrointestinal:  Negative for nausea.   Musculoskeletal:  Positive for back pain.   Neurological:  Negative for syncope and light-headedness.       Objective      Vital Signs  Temp:  [97.7 °F (36.5 °C)-98.3 °F (36.8 °C)] 98 °F (36.7 °C)  Heart Rate:  [60-82] 64  Resp:  [18] 18  BP: (100-140)/(52-84) 120/80  Body mass index is 36.36 kg/m².    Intake/Output Summary (Last 24 hours) at 9/22/2023 1226  Last data filed at 9/22/2023 0000  Gross per 24 hour   Intake 240 ml   Output --   Net 240 ml       Physical Exam  Vitals and nursing note reviewed. Exam conducted with a chaperone present (Wife and RN at bedside).   Constitutional:       General: He is not in acute distress.     Appearance: He is obese. He is not ill-appearing.   HENT:       Head: Normocephalic and atraumatic.   Cardiovascular:      Rate and Rhythm: Normal rate and regular rhythm.      Pulses: Normal pulses.   Pulmonary:      Effort: Pulmonary effort is normal.      Breath sounds: Normal breath sounds.   Musculoskeletal:      Right lower leg: No edema.      Left lower leg: No edema.   Skin:     General: Skin is warm and dry.   Neurological:      General: No focal deficit present.      Mental Status: He is alert.   Psychiatric:         Mood and Affect: Mood normal.         Behavior: Behavior normal.       Telemetry: Sinus 60s    Results Review:   I reviewed the patient's new clinical results.  Results from last 7 days   Lab Units 09/22/23  0916 09/21/23  1439 09/21/23  1232   HSTROP T ng/L 14 18* 22*     Results from last 7 days   Lab Units 09/22/23  0211 09/21/23  1232   WBC 10*3/mm3 7.42 8.68   HEMOGLOBIN g/dL 11.9* 13.4   PLATELETS 10*3/mm3 189 230     Results from last 7 days   Lab Units 09/22/23  0211 09/21/23  1232   SODIUM mmol/L 140 139   POTASSIUM mmol/L 4.2 3.8   CHLORIDE mmol/L 107 104   CO2 mmol/L 26.7 24.5   BUN mg/dL 24* 22   CREATININE mg/dL 0.98 1.22   CALCIUM mg/dL 9.1 9.3   GLUCOSE mg/dL 109* 104*   ALT (SGPT) U/L  --  18   AST (SGOT) U/L  --  22     Lab Results   Component Value Date    INR 1.02 06/29/2022     Lab Results   Component Value Date    MG 2.1 09/22/2023    MG 2.1 06/29/2022    MG 1.9 06/29/2022     Lab Results   Component Value Date    TSH 0.791 09/21/2023    TRIG 87 09/21/2023    HDL 52 09/21/2023    LDL 12 09/21/2023      Lab Results   Component Value Date    PROBNP 1,081.0 (H) 06/29/2022        EKG:     Imaging Results (Last 72 Hours)       Procedure Component Value Units Date/Time    XR Chest AP [[id number]] Collected: 09/21/23 1506     Updated: 09/21/23 1509    Narrative:      EXAM:    XR Chest, 1 View     EXAM DATE:    9/21/2023 3:20 PM     CLINICAL HISTORY:    Chest Pain Triage Protocol     TECHNIQUE:    Frontal view of the chest.     COMPARISON:     6/29/2022     FINDINGS:    Lungs and pleural spaces:  Unremarkable as visualized.  No  consolidation.  No pneumothorax.    Heart:  Unremarkable as visualized.  No cardiomegaly.    Mediastinum:  Unremarkable as visualized.    Bones/joints:  Unremarkable as visualized.       Impression:        Unremarkable exam. No acute cardiopulmonary findings identified.        This report was finalized on 9/21/2023 3:07 PM by Dr. Nathan Flores MD.               Assessment:  Chest pain, atypical  Coronary artery disease status post RCA PCI in the setting of inferior STEMI, patient is compliant with his medication  Hypertension  Hyperlipidemia      Recommendations:  Patient stress test yesterday showed no significant ischemia.  His troponins have remained negative, EKG showed no acute ST-T changes.  Recommend increasing his antianginal medication, will see him in my office next week and if he continues to have intermittent anginal symptoms and will consider invasive evaluation with coronary angiogram.      Thank you very much for asking us to be involved in this patient's care.  We will follow along with you.      Electronically signed by MATTHEW Amezcua, 09/22/23, 2:01 PM EDT.          Donovan Swartz MD, [facility]  Interventional Cardiology

## 2023-09-22 NOTE — THERAPY EVALUATION
Acute Care - Occupational Therapy Initial Evaluation  [facility]     Patient Name: Delmar Denise Sr.  : 1960  MRN: [id number]  Today's Date: 2023             Admit Date: 2023     No diagnosis found.  Patient Active Problem List   Diagnosis    Suture check    Pre-op testing    Lipoma of head    NSTEMI (non-ST elevated myocardial infarction)    Mixed hyperlipidemia    Primary hypertension    Coronary artery disease involving native coronary artery of native heart without angina pectoris s/p RCA PCI in 2022     Unstable angina     Past Medical History:   Diagnosis Date    Arthritis     Back pain     Coronary artery disease involving native coronary artery of native heart without angina pectoris s/p RCA PCI in 2022    Degenerative disc disease, lumbar     Mixed hyperlipidemia 2022     Past Surgical History:   Procedure Laterality Date    ABDOMINAL SURGERY      CARDIAC CATHETERIZATION N/A 2022    Procedure: Left Heart Cath;  Surgeon: Donovan Swartz MD;  Location: [facility] CATH INVASIVE LOCATION;  Service: Cardiology;  Laterality: N/A;    CARDIAC CATHETERIZATION N/A 2022    Procedure: Percutaneous Coronary Intervention;  Surgeon: Donovan Swartz MD;  Location: [facility] CATH INVASIVE LOCATION;  Service: Cardiology;  Laterality: N/A;    COLONOSCOPY      HEAD/NECK LESION/CYST EXCISION Left 2020    Procedure: Excision lipoma left posterior scalp;  Surgeon: Sravani Celaya MD;  Location: [facility];  Service: General;  Laterality: Left;    JOINT REPLACEMENT Left     KNEE SURGERY Bilateral     KNEE SURGERY      bilateraly    SHOULDER SURGERY      SHOULDER SURGERY      bilateral         OT ASSESSMENT FLOWSHEET (last 12 hours)       OT Evaluation and Treatment       Row Name 23 1053                   OT Time and Intention    Document Type evaluation  -KR        Mode of Treatment occupational therapy  -KR        Patient Effort good  -KR            General Information    General Observations of Patient alert/cooperative  -KR        Prior Level of Function independent:  -KR           Living Environment    Current Living Arrangements home  -KR        People in Home spouse  -KR           Home Use of Assistive/Adaptive Equipment    Equipment Currently Used at Home none  -KR           Cognition    Affect/Mental Status (Cognition) WFL  -KR        Orientation Status (Cognition) oriented x 4  -KR        Follows Commands (Cognition) WFL  -KR           Range of Motion Comprehensive    Comment, General Range of Motion BUE WFL  -KR           Strength Comprehensive (MMT)    Comment, General Manual Muscle Testing (MMT) Assessment BUE WFL  -KR           Activities of Daily Living    BADL Assessment/Intervention --  Pt is able to perform all self care activity with SBA/[place]. Baseline function reported/noted.  -KR           Plan of Care Review    Plan of Care Reviewed With patient;spouse  -KR           Therapy Assessment/Plan (OT)    Comment, Therapy Assessment/Plan (OT) No further skilled OT services needed at this time.  -KR           Therapy Plan Review/Discharge Plan (OT)    Anticipated Discharge Disposition (OT) home  -KR                  User Key  (r) = Recorded By, (t) = Taken By, (c) = Cosigned By      Initials Name Effective Dates    KR Nathan Ariza, OT 06/16/21 -                            OT Recommendation and Plan     Plan of Care Review  Plan of Care Reviewed With: patient, spouse  Plan of Care Reviewed With: patient, spouse     Outcome Measures       Row Name 09/22/23 1105             Modified [place] Scale    Pre-Stroke Modified [place] Scale 1 - No significant disability despite symptoms.  Able to carry out all usual duties and activities.  -KR      Modified [place] Scale 1 - No significant disability despite symptoms.  Able to carry out all usual duties and activities.  -KR         Functional Assessment    Outcome Measure Options Modified [place]  -KR                 User Key  (r) = Recorded By, (t) = Taken By, (c) = Cosigned By      Initials Name Provider Type     Natahn Ariza OT Occupational Therapist                    Time Calculation:     Therapy Charges for Today       Code Description Service Date Service Provider Modifiers Qty    41012572072  OT EVAL LOW COMPLEXITY 4 9/22/2023 Nathan Ariza OT GO 1                 Nathan Ariza OT  9/22/2023

## 2023-09-25 ENCOUNTER — TELEPHONE (OUTPATIENT)
Dept: TELEMETRY | Facility: HOSPITAL | Age: 63
End: 2023-09-25

## 2023-09-28 ENCOUNTER — OFFICE VISIT (OUTPATIENT)
Dept: CARDIOLOGY | Facility: CLINIC | Age: 63
End: 2023-09-28
Payer: MEDICARE

## 2023-09-28 VITALS
BODY MASS INDEX: 36.39 KG/M2 | SYSTOLIC BLOOD PRESSURE: 124 MMHG | HEIGHT: 73 IN | OXYGEN SATURATION: 96 % | WEIGHT: 274.6 LBS | HEART RATE: 65 BPM | DIASTOLIC BLOOD PRESSURE: 76 MMHG

## 2023-09-28 DIAGNOSIS — I25.10 CORONARY ARTERY DISEASE INVOLVING NATIVE CORONARY ARTERY OF NATIVE HEART WITHOUT ANGINA PECTORIS: ICD-10-CM

## 2023-09-28 DIAGNOSIS — I10 PRIMARY HYPERTENSION: ICD-10-CM

## 2023-09-28 DIAGNOSIS — I21.4 NSTEMI (NON-ST ELEVATED MYOCARDIAL INFARCTION): Primary | ICD-10-CM

## 2023-09-28 DIAGNOSIS — I20.0 UNSTABLE ANGINA: ICD-10-CM

## 2023-09-28 DIAGNOSIS — E78.2 MIXED HYPERLIPIDEMIA: ICD-10-CM

## 2023-09-28 RX ORDER — RANOLAZINE 500 MG/1
500 TABLET, EXTENDED RELEASE ORAL EVERY 12 HOURS SCHEDULED
Qty: 90 TABLET | Refills: 3 | Status: SHIPPED | OUTPATIENT
Start: 2023-09-28

## 2023-09-28 NOTE — PROGRESS NOTES
[facility] CARDIOLOGY  [address] [address]  [place] [address]  Phone: [phone]  Fax: [phone]    09/28/2023    No chief complaint on file.       History:     Delmar Denise Sr. is a 63 y.o. male presenting for  follow-up evaluation   Clinically stable from cardiac standpoint   Symptoms:  CP no  SOB no    Orthopnea No  Lower extremity edema no   Palpitations no   Compliant with medications yes  Claudication no      Past Medical History:   Diagnosis Date    Arthritis     Back pain     Coronary artery disease involving native coronary artery of native heart without angina pectoris s/p RCA PCI in 6/2022 8/6/2022    Degenerative disc disease, lumbar     Mixed hyperlipidemia 8/6/2022       Past Surgical History:   Procedure Laterality Date    ABDOMINAL SURGERY      CARDIAC CATHETERIZATION N/A 6/29/2022    Procedure: Left Heart Cath;  Surgeon: Donovan Swartz MD;  Location: [facility] INVASIVE LOCATION;  Service: Cardiology;  Laterality: N/A;    CARDIAC CATHETERIZATION N/A 6/29/2022    Procedure: Percutaneous Coronary Intervention;  Surgeon: Donovan Swartz MD;  Location: [facility] INVASIVE LOCATION;  Service: Cardiology;  Laterality: N/A;    COLONOSCOPY      HEAD/NECK LESION/CYST EXCISION Left 8/25/2020    Procedure: Excision lipoma left posterior scalp;  Surgeon: Sravani Celaya MD;  Location: [facility];  Service: General;  Laterality: Left;    JOINT REPLACEMENT Left     KNEE SURGERY Bilateral     KNEE SURGERY      bilateraly    SHOULDER SURGERY      SHOULDER SURGERY      bilateral        Past Social History:  Social History     Socioeconomic History    Marital status:    Tobacco Use    Smoking status: Never    Smokeless tobacco: Former     Types: Snuff   Vaping Use    Vaping Use: Never used   Substance and Sexual Activity    Alcohol use: Yes     Comment: occasionally    Drug use: No    Sexual activity: Defer     Partners: Female     Birth  "control/protection: None       Past Family History:  Family History   Adopted: Yes   Family history unknown: Yes       Review of Systems:   ROS       Current Outpatient Medications   Medication Sig Dispense Refill    aspirin 81 MG EC tablet Take 1 tablet by mouth Daily. 90 tablet 0    atorvastatin (LIPITOR) 80 MG tablet Take 1 tablet by mouth Every Night for 120 days. 30 tablet 3    gabapentin (NEURONTIN) 600 MG tablet Take 1 tablet by mouth 2 (Two) Times a Day.      HYDROcodone-acetaminophen (NORCO) 7.5-325 MG per tablet Take 1 tablet by mouth Every Night.      isosorbide mononitrate (IMDUR) 30 MG 24 hr tablet Take 1 tablet by mouth Daily. 30 tablet 0    losartan (COZAAR) 25 MG tablet Take 1 tablet by mouth Daily for 30 days. 30 tablet 0    metoprolol tartrate (LOPRESSOR) 25 MG tablet Take 1 tablet by mouth Every 12 (Twelve) Hours. 90 tablet 3    nitroglycerin (NITROSTAT) 0.4 MG SL tablet Place 1 tablet under the tongue Every 5 (Five) Minutes As Needed for Chest Pain (Only if SBP Greater Than 100). Take no more than 3 doses in 15 minutes. 30 tablet 12    ranolazine (RANEXA) 500 MG 12 hr tablet Take 1 tablet by mouth Every 12 (Twelve) Hours. 90 tablet 3    ticagrelor (Brilinta) 60 MG tablet tablet Take 1 tablet by mouth 2 (Two) Times a Day. 180 tablet 3     No current facility-administered medications for this visit.        No Known Allergies    Objective     /76 (BP Location: Left arm, Patient Position: Sitting, Cuff Size: Adult)   Pulse 65   Ht 185.4 cm (73\")   Wt 125 kg (274 lb 9.6 oz)   SpO2 96%   BMI 36.23 kg/m²     Physical Exam       DATA:   Results for orders placed during the hospital encounter of 09/21/23    Adult Transthoracic Echo Complete w/ Color, Spectral and Contrast if necessary per protocol    Interpretation Summary    Left ventricular systolic function is normal. Left ventricular ejection fraction appears to be 51 - 55%.    Left ventricular diastolic function is consistent with (grade " I) impaired relaxation.    There is no evidence of pericardial effusion   Results for orders placed during the hospital encounter of 09/21/23    Stress Test With Myocardial Perfusion One Day    Interpretation Summary    Myocardial perfusion imaging indicates a small-sized infarct located in the inferior wall with no significant ischemia noted.    Left ventricular ejection fraction is normal (Calculated EF = 60%).    Abnormal LV wall motion consistent with mild hypokineses of the inferior wall.    Impressions are consistent with an intermediate risk study.    Diaphragmatic attenuation artifact is present.    Findings consistent with a normal ECG stress test.   Results for orders placed during the hospital encounter of 09/21/23    Stress Test With Myocardial Perfusion One Day    Interpretation Summary    Myocardial perfusion imaging indicates a small-sized infarct located in the inferior wall with no significant ischemia noted.    Left ventricular ejection fraction is normal (Calculated EF = 60%).    Abnormal LV wall motion consistent with mild hypokineses of the inferior wall.    Impressions are consistent with an intermediate risk study.    Diaphragmatic attenuation artifact is present.    Findings consistent with a normal ECG stress test.   Results for orders placed during the hospital encounter of 06/29/22    Cardiac Catheterization/Vascular Study    Narrative  Images from the original result were not included.  CARDIAC CATHETERIZATION / INTERVENTION REPORT            DATE OF PROCEDURE: 6/29/2022      INDICATION FOR PROCEDURE: NSTEMI      PRE PROCEDURE DIAGNOSIS:  NSTEMI  HTN    POST PROCEDURE DIAGNOSIS:  CAD proximal % occlusion s/p PCI with 3.5 x 18 MATTHEW post dilated with 4.5 NC balloon at high pressure    Face to face mdoerate conscious  sedation time :      COMPLICATIONS : None    Specimens collected : None    PROCEDURE PERFORMED:    1. Selective right and left Coronary Angiogram  2. Left heart  catheretization  3. Left Ventriculography  4. PCI of RCA  5. IVUS of RCA  6.  Manual aspiration thrombectomy of RCA    Description of the procedure:  Prior to the procedure risk, benefits and possible alternative were discussed with the patient and informed consent was obtained. Patient was brought to cardiac cath lab table in post absorbtive state. Patient was prepped and drape in usual sterile fashion. IV Versed and Fentanyl was used for moderate sedation. 2% Lidocaine was used for topical anesthesia. R radial arterial site was prepped and a micropuncture needle was used to access the artery and a 5 F slender sheath was placed. 2.5 mg of Verapamil and 200 mcg of NTG was given through the sheath intra arterial and 5000 units of Heparin was given once the catheter crossed the aortic arch.    5 F TIG 4 catheters was used for right and left coronary angiogram and 5 F pigtail catheter was used for Left heart hemodynamics and left ventriculography. All the catheters were exchanged over 0.035 wire. The R radial arterial sheath was removed and TR band was applied and immediate and complete hemostasis was achieved. The patient tolerate the entire procedure well without any immediate known complications.    Coronary anatomy findings:    LM: Is a large calibre vessel , normal take off from left cusp, divides into LAD and Lcx. No significant stenosis noted    LAD: Large calibre vessel, mid after origin of D1 had mild 40% stenosis, no other significant stenosis noted, D1 patent    LCX: Moderate calibre vessel, mild luminal irregularities. Large hign OM1 artery with no stenosis, small OM2 artery which had about 40-50% stenosis in proximal portion.    RCA: Large calibre, dominant artery, normal take off from right cusp.  100% occluded in proximal after take of conus branch s/p PCI with LILIAN 3 flow, mid had mild 30-40% stenosis, RPDA and PL branches had no stenosis. .    Left Ventriculography:    LV systolic function was mildly  reduced  with visual estimated EF of 45-50% with inferior wall hypokinesia.  No significant mitral regurgitation noted.    LVEDP: 21 mmHg  No gradient across the aortic valve on pull back.      PERCUTANEOUS CORONARY INTERVENTION PROCEDURE NOTE:    The patient did not have any ST elevations on presentation, he already had collaterals supplying the distal RPDA and PL, I did asked the patient regarding symptom onset and he was very confident that his first ever chest pain was last night which is less than 24 hours so I decided to try to wire and do angioplasty to see if occlusion is more of an acute rather than subacute.    Heparin monotherapy was used for anticoagulation.  Total of 10,000 Units was given IV. Pt also received single bolus integrelin    6 F JR 4 guide was used to engage the RCA coaxially.    0.014 Runthrough guidewire was used to cross the lesion and parked distally.  There was no challenges in wiring the artery likely an acute occlusion.    There was significant clot burden even after wiring of the RCA so used [employer] manual aspiration catheter and significant red thrombi was extracted which actually did establish a good LILIAN-3 flow and 90% stenosis at the plaque rupture site.    Used a 3.0 x 15 compliant TREK balloon to predilate the lesion at 20 linda.    Prepped a 3.5 x 18 [employer] eluting stent and position at the lesion and successfully deployed at 16 linda.  The stent did appear to be undersized for the more distal portion of the stent and the distal reference artery diameter was at least 5 mm from IVUS, I then used a 4.5 NC balloon to postdilated 20 linda.    Post stent deployment angio pictures showed good expansion with 0% residual stenosis, LILIAN 3 flow in the distal vascular bed and no dissection or distal wire perforation.    Lesion length: 15 mm  Pre PCI Stenosis: 100 %  Post PCI stenosis: 0 %  Pre PCI LILIAN flow: 0  Post PCI LILIAN flow: 3    EBL: Less than  10cc              Recommendations:  Aggressive guideline directed medical management for CAD  Dual Anti platelet medication with Asa 81 mg qd and Brilinta 90 mg bid for minimum 1 year.            Donovan Swartz MD, [facility]  Interventional Cardiology    06/29/22  12:39 EDT    Procedures     Lab Results   Component Value Date    CHOL 81 09/21/2023    CHOL 118 06/29/2022     Lab Results   Component Value Date    TRIG 87 09/21/2023    TRIG 59 06/29/2022     Lab Results   Component Value Date    HDL 52 09/21/2023    HDL 50 06/29/2022     Lab Results   Component Value Date    LDL 12 09/21/2023    LDL 55 06/29/2022       Lab Results   Component Value Date    TSH 0.791 09/21/2023               Invalid input(s): LABALBU, PROT          Assessment and Plan    Assessment and Plan    Problem List Items Addressed This Visit          Cardiac and Vasculature    NSTEMI (non-ST elevated myocardial infarction) - Primary    Relevant Medications    ranolazine (RANEXA) 500 MG 12 hr tablet    Mixed hyperlipidemia    Primary hypertension    Coronary artery disease involving native coronary artery of native heart without angina pectoris s/p RCA PCI in 6/2022     Relevant Medications    ranolazine (RANEXA) 500 MG 12 hr tablet    Unstable angina    Relevant Medications    ranolazine (RANEXA) 500 MG 12 hr tablet           Recommended increase activity to 30 minutes of walking daily, most days of the week    Thank you for allowing me to participate in the care of Delmar Denise Sr.. Feel free to contact me directly with any further questions or concerns.          Donovan Swartz MD, [facility]  Interventional Cardiology

## 2023-09-30 NOTE — ED PROVIDER NOTES
Subjective   History of Present Illness  Patient is a 63-year-old male with past medical history significant for hyperlipidemia, CAD, hypertension.  He presents the ED today with complaints of chest pain.  He states that he has been having upper back pain and pain in between his shoulder blades for 2 days.  He states that the pain radiated to his left arm.  He reports that this is similar to when he had his last heart attack.  He reports that Dr. Leonadr's office advised him to come to the ED to for further evaluation.      Review of Systems   Constitutional: Negative.  Negative for fever.   HENT: Negative.     Eyes: Negative.    Respiratory: Negative.     Cardiovascular:  Positive for chest pain.   Gastrointestinal: Negative.  Negative for abdominal pain.   Endocrine: Negative.    Genitourinary: Negative.  Negative for dysuria.   Musculoskeletal: Negative.    Skin: Negative.    Allergic/Immunologic: Negative.    Neurological: Negative.    Hematological: Negative.    Psychiatric/Behavioral: Negative.     All other systems reviewed and are negative.    Past Medical History:   Diagnosis Date    Arthritis     Back pain     Coronary artery disease involving native coronary artery of native heart without angina pectoris s/p RCA PCI in 6/2022 8/6/2022    Degenerative disc disease, lumbar     Mixed hyperlipidemia 8/6/2022       No Known Allergies    Past Surgical History:   Procedure Laterality Date    ABDOMINAL SURGERY      CARDIAC CATHETERIZATION N/A 6/29/2022    Procedure: Left Heart Cath;  Surgeon: Donovan Swartz MD;  Location:  [facility];  Service: Cardiology;  Laterality: N/A;    CARDIAC CATHETERIZATION N/A 6/29/2022    Procedure: Percutaneous Coronary Intervention;  Surgeon: Donovan Swartz MD;  Location:  [facility];  Service: Cardiology;  Laterality: N/A;    COLONOSCOPY      HEAD/NECK LESION/CYST EXCISION Left 8/25/2020    Procedure: Excision lipoma  left posterior scalp;  Surgeon: Sravani Celaya MD;  [facility];  Service: General;  Laterality: Left;    JOINT REPLACEMENT Left     KNEE SURGERY Bilateral     KNEE SURGERY      bilateraly    SHOULDER SURGERY      SHOULDER SURGERY      bilateral       Family History   Adopted: Yes   Family history unknown: Yes       Social History     Socioeconomic History    Marital status:    Tobacco Use    Smoking status: Never    Smokeless tobacco: Former     Types: Snuff   Vaping Use    Vaping Use: Never used   Substance and Sexual Activity    Alcohol use: Yes     Comment: occasionally    Drug use: No    Sexual activity: Defer     Partners: Female     Birth control/protection: None           Objective   Physical Exam  Vitals and nursing note reviewed.   Constitutional:       General: He is not in acute distress.     Appearance: He is well-developed. He is not diaphoretic.   HENT:      Head: Normocephalic and atraumatic.      Right Ear: External ear normal.      Left Ear: External ear normal.      Nose: Nose normal.   Eyes:      Conjunctiva/sclera: Conjunctivae normal.      Pupils: Pupils are equal, round, and reactive to light.   Neck:      Vascular: No JVD.      Trachea: No tracheal deviation.   Cardiovascular:      Rate and Rhythm: Normal rate and regular rhythm.      Heart sounds: Normal heart sounds. No murmur heard.  Pulmonary:      Effort: Pulmonary effort is normal. No respiratory distress.      Breath sounds: Normal breath sounds. No wheezing.   Abdominal:      General: Bowel sounds are normal.      Palpations: Abdomen is soft.      Tenderness: There is no abdominal tenderness.   Musculoskeletal:         General: No deformity. Normal range of motion.      Cervical back: Normal range of motion and neck supple.   Skin:     General: Skin is warm and dry.      Capillary Refill: Capillary refill takes less than 2 seconds.      Coloration: Skin is not pale.      Findings: No erythema or rash.    Neurological:      General: No focal deficit present.      Mental Status: He is alert and oriented to person, place, and time.      Cranial Nerves: No cranial nerve deficit.   Psychiatric:         Mood and Affect: Mood normal.         Behavior: Behavior normal.         Thought Content: Thought content normal.       Procedures         Results for orders placed or performed during the hospital encounter of 09/21/23   Comprehensive Metabolic Panel    Specimen: Blood   Result Value Ref Range    Glucose 104 (H) 65 - 99 mg/dL    BUN 22 8 - 23 mg/dL    Creatinine 1.22 0.76 - 1.27 mg/dL    Sodium 139 136 - 145 mmol/L    Potassium 3.8 3.5 - 5.2 mmol/L    Chloride 104 98 - 107 mmol/L    CO2 24.5 22.0 - 29.0 mmol/L    Calcium 9.3 8.6 - 10.5 mg/dL    Total Protein 7.1 6.0 - 8.5 g/dL    Albumin 4.2 3.5 - 5.2 g/dL    ALT (SGPT) 18 1 - 41 U/L    AST (SGOT) 22 1 - 40 U/L    Alkaline Phosphatase 90 39 - 117 U/L    Total Bilirubin 0.7 0.0 - 1.2 mg/dL    Globulin 2.9 gm/dL    A/G Ratio 1.4 g/dL    BUN/Creatinine Ratio 18.0 7.0 - 25.0    Anion Gap 10.5 5.0 - 15.0 mmol/L    eGFR 66.2 >60.0 mL/min/1.73   High Sensitivity Troponin T    Specimen: Blood   Result Value Ref Range    HS Troponin T 22 (H) <15 ng/L   CBC Auto Differential    Specimen: Blood   Result Value Ref Range    WBC 8.68 3.40 - 10.80 10*3/mm3    RBC 4.68 4.14 - 5.80 10*6/mm3    Hemoglobin 13.4 13.0 - 17.7 g/dL    Hematocrit 41.3 37.5 - 51.0 %    MCV 88.2 79.0 - 97.0 fL    MCH 28.6 26.6 - 33.0 pg    MCHC 32.4 31.5 - 35.7 g/dL    RDW 14.6 12.3 - 15.4 %    RDW-SD 46.9 37.0 - 54.0 fl    MPV 8.9 6.0 - 12.0 fL    Platelets 230 140 - 450 10*3/mm3    Neutrophil % 71.3 42.7 - 76.0 %    Lymphocyte % 22.2 19.6 - 45.3 %    Monocyte % 5.3 5.0 - 12.0 %    Eosinophil % 0.5 0.3 - 6.2 %    Basophil % 0.5 0.0 - 1.5 %    Immature Grans % 0.2 0.0 - 0.5 %    Neutrophils, Absolute 6.19 1.70 - 7.00 10*3/mm3    Lymphocytes, Absolute 1.93 0.70 - 3.10 10*3/mm3    Monocytes, Absolute 0.46 0.10 -  0.90 10*3/mm3    Eosinophils, Absolute 0.04 0.00 - 0.40 10*3/mm3    Basophils, Absolute 0.04 0.00 - 0.20 10*3/mm3    Immature Grans, Absolute 0.02 0.00 - 0.05 10*3/mm3    nRBC 0.0 0.0 - 0.2 /100 WBC   High Sensitivity Troponin T 2Hr    Specimen: Arm, Right; Blood   Result Value Ref Range    HS Troponin T 18 (H) <15 ng/L    Troponin T Delta -4 (L) >=-4 - <+4 ng/L   Hemoglobin A1c    Specimen: Blood   Result Value Ref Range    Hemoglobin A1C 5.90 (H) 4.80 - 5.60 %   Lipid Panel    Specimen: Arm, Right; Blood   Result Value Ref Range    Total Cholesterol 81 0 - 200 mg/dL    Triglycerides 87 0 - 150 mg/dL    HDL Cholesterol 52 40 - 60 mg/dL    LDL Cholesterol  12 0 - 100 mg/dL    VLDL Cholesterol 17 5 - 40 mg/dL    LDL/HDL Ratio 0.22    TSH    Specimen: Arm, Right; Blood   Result Value Ref Range    TSH 0.791 0.270 - 4.200 uIU/mL   Basic Metabolic Panel    Specimen: Blood   Result Value Ref Range    Glucose 109 (H) 65 - 99 mg/dL    BUN 24 (H) 8 - 23 mg/dL    Creatinine 0.98 0.76 - 1.27 mg/dL    Sodium 140 136 - 145 mmol/L    Potassium 4.2 3.5 - 5.2 mmol/L    Chloride 107 98 - 107 mmol/L    CO2 26.7 22.0 - 29.0 mmol/L    Calcium 9.1 8.6 - 10.5 mg/dL    BUN/Creatinine Ratio 24.5 7.0 - 25.0    Anion Gap 6.3 5.0 - 15.0 mmol/L    eGFR 86.1 >60.0 mL/min/1.73   Magnesium    Specimen: Blood   Result Value Ref Range    Magnesium 2.1 1.6 - 2.4 mg/dL   CBC Auto Differential    Specimen: Blood   Result Value Ref Range    WBC 7.42 3.40 - 10.80 10*3/mm3    RBC 4.19 4.14 - 5.80 10*6/mm3    Hemoglobin 11.9 (L) 13.0 - 17.7 g/dL    Hematocrit 37.6 37.5 - 51.0 %    MCV 89.7 79.0 - 97.0 fL    MCH 28.4 26.6 - 33.0 pg    MCHC 31.6 31.5 - 35.7 g/dL    RDW 14.7 12.3 - 15.4 %    RDW-SD 48.1 37.0 - 54.0 fl    MPV 8.8 6.0 - 12.0 fL    Platelets 189 140 - 450 10*3/mm3    Neutrophil % 60.6 42.7 - 76.0 %    Lymphocyte % 31.1 19.6 - 45.3 %    Monocyte % 6.9 5.0 - 12.0 %    Eosinophil % 0.7 0.3 - 6.2 %    Basophil % 0.4 0.0 - 1.5 %    Immature Grans %  0.3 0.0 - 0.5 %    Neutrophils, Absolute 4.50 1.70 - 7.00 10*3/mm3    Lymphocytes, Absolute 2.31 0.70 - 3.10 10*3/mm3    Monocytes, Absolute 0.51 0.10 - 0.90 10*3/mm3    Eosinophils, Absolute 0.05 0.00 - 0.40 10*3/mm3    Basophils, Absolute 0.03 0.00 - 0.20 10*3/mm3    Immature Grans, Absolute 0.02 0.00 - 0.05 10*3/mm3    nRBC 0.0 0.0 - 0.2 /100 WBC   High Sensitivity Troponin T    Specimen: Blood   Result Value Ref Range    HS Troponin T 14 <15 ng/L   Stress Test With Myocardial Perfusion One Day   Result Value Ref Range    Nuclear Prior Study 3.0     Exercise duration (min) 6 min    Exercise duration (sec) 49 sec    Estimated workload 9.5 METS    BH CV REST NUCLEAR ISOTOPE DOSE 9.6 mCi    BH CV STRESS NUCLEAR ISOTOPE DOSE 27.6 mCi    BH CV STRESS PROTOCOL 1 Jasbir     Stage 1 1.0     HR Stage 1 102     BP Stage 1 148/65     Duration Min Stage 1 3     Duration Sec Stage 1 0     Grade Stage 1 10     Speed Stage 1 1.7     BH CV STRESS METS STAGE 1 5.0     Stage 2 2.0     HR Stage 2 122     BP Stage 2 158/74     Duration Min Stage 2 3     Duration Sec Stage 2 0     Grade Stage 2 12     Speed Stage 2 2.5     BH CV STRESS METS STAGE 2 7.5     Stage 3 3.0     HR Stage 3 133     Duration Min Stage 3 3     Duration Sec Stage 3 0     Grade Stage 3 14     Speed Stage 3 3.4     BH CV STRESS METS STAGE 3 10.0     Baseline HR 70 bpm    Baseline /59 mmHg    Peak  bpm    Peak /74 mmHg    Recovery HR 86 bpm    Recovery /65 mmHg    Target HR (85%) 133 bpm    Max. Pred. HR (100%) 156 bpm    Percent Max Pred HR 85.26 %    Percent Target  %    Nuc Stress EF 60 %   ECG 12 Lead ED Triage Standing Order; Chest Pain   Result Value Ref Range    QT Interval 426 ms    QTC Interval 462 ms   Adult Transthoracic Echo Complete w/ Color, Spectral and Contrast if necessary per protocol   Result Value Ref Range    LVIDd 5.3 cm    LVIDs 3.5 cm    IVSd 0.96 cm    LVPWd 1.09 cm    FS 33.7 %    IVS/LVPW 0.88 cm     ESV(cubed) 44.6 ml    LV Sys Vol (BSA corrected) 20.1 cm2    EDV(cubed) 152.7 ml    LV Nascimento Vol (BSA corrected) 52.4 cm2    LVOT area 4.5 cm2    LV mass(C)d 209.5 grams    LVOT diam 2.40 cm    EDV(MOD-sp4) 129.0 ml    ESV(MOD-sp4) 49.5 ml    SV(MOD-sp4) 79.5 ml    SI(MOD-sp4) 32.3 ml/m2    EF(MOD-sp4) 61.6 %    MV E max carlos 57.8 cm/sec    MV A max carlos 78.4 cm/sec    MV E/A 0.74     LA ESV Index (BP) 15.4 ml/m2    Med Peak E' Carlos 7.0 cm/sec    Lat Peak E' Carlos 14.9 cm/sec    Avg E/e' ratio 5.28     TAPSE (>1.6) 3.2 cm    LA dimension (2D)  3.4 cm    Ao pk carlos 124.0 cm/sec    Ao max PG 6.2 mmHg    Ao mean PG 3.0 mmHg    Ao V2 VTI 26.8 cm    PA acc time 0.07 sec    Ao root diam 3.4 cm    ACS 1.70 cm   Green Top (Gel)   Result Value Ref Range    Extra Tube Hold for add-ons.    Lavender Top   Result Value Ref Range    Extra Tube hold for add-on    Gold Top - SST   Result Value Ref Range    Extra Tube Hold for add-ons.    Light Blue Top   Result Value Ref Range    Extra Tube Hold for add-ons.       XR Chest AP   Final Result     Unremarkable exam. No acute cardiopulmonary findings identified.           This report was finalized on 9/21/2023 3:07 PM by Dr. Nathan Flores MD.             Heart Score 4    ED Course  ED Course as of 09/30/23 0228   u Sep 21, 2023   1215 EKG normal sinus rhythm rate 71 parable 180 QRS 94 QTc 462 left axis deviation no ST elevation. Electronically signed by Samm Richards MD, 09/21/23, 12:15 PM EDT.   [BB]   8758 Case d/w Dr. Leonard, advises to admit for chest pain rule out. Make NPO for possible stress test in AM. D/w Dr. Miller, will admit to hospitalist service. [MB]      ED Course User Index  [BB] Samm Richards MD  [MB] Maribel Sanchez APRN                                           Medical Decision Making  Amount and/or Complexity of Data Reviewed  Labs: ordered.  Radiology: ordered.  ECG/medicine tests: ordered.    Risk  OTC drugs.  Prescription drug management.  Decision  regarding hospitalization.        Final diagnoses:   Unstable angina       ED Disposition  ED Disposition       ED Disposition   Decision to Admit    Condition   --    Comment   Level of Care: Telemetry [5]   Diagnosis: Unstable angina [435944]                 Arnulfo Leiva, APRN  [address] [facility] [address]  [facility] [id number] [phone]    Follow up on 9/29/2023  Will notify patient about follow-up appt. with Arnulfo Leiva on 9/25/23.    Donovan Swartz MD  [address] [facility] [address]  [facility] [id number] [phone]    Follow up on 10/26/2023  Will notify patient about follow-up appt. with Dr. Leonard 9/25/23.         Medication List        New Prescriptions      isosorbide mononitrate 30 MG 24 hr tablet  Commonly known as: IMDUR  Take 1 tablet by mouth Daily.     nitroglycerin 0.4 MG SL tablet  Commonly known as: NITROSTAT  Place 1 tablet under the tongue Every 5 (Five) Minutes As Needed for Chest Pain (Only if SBP Greater Than 100). Take no more than 3 doses in 15 minutes.            Changed      atorvastatin 80 MG tablet  Commonly known as: LIPITOR  Take 1 tablet by mouth Every Night for 120 days.  What changed:   medication strength  how much to take     gabapentin 600 MG tablet  Commonly known as: NEURONTIN  What changed: Another medication with the same name was removed. Continue taking this medication, and follow the directions you see here.     losartan 25 MG tablet  Commonly known as: COZAAR  Take 1 tablet by mouth Daily for 30 days.  What changed:   medication strength  how much to take               Where to Get Your Medications        These medications were sent to [facility] [id number] - [place] - [address] AT [facility] [address]  - [phone]  - [phone]   [address] [place] [address]      Phone: [phone]   atorvastatin 80 MG tablet  isosorbide mononitrate 30 MG 24 hr tablet  losartan 25 MG tablet  nitroglycerin 0.4 MG SL  tablet            Maribel Sanchez, APRN  09/30/23 0220

## 2023-10-12 RX ORDER — RANOLAZINE 500 MG/1
500 TABLET, EXTENDED RELEASE ORAL EVERY 12 HOURS SCHEDULED
Qty: 180 TABLET | Refills: 3 | Status: SHIPPED | OUTPATIENT
Start: 2023-10-12

## 2023-12-11 ENCOUNTER — OFFICE VISIT (OUTPATIENT)
Dept: CARDIOLOGY | Facility: CLINIC | Age: 63
End: 2023-12-11
Payer: MEDICARE

## 2023-12-11 VITALS
HEIGHT: 73 IN | BODY MASS INDEX: 36.37 KG/M2 | HEART RATE: 66 BPM | DIASTOLIC BLOOD PRESSURE: 71 MMHG | OXYGEN SATURATION: 98 % | SYSTOLIC BLOOD PRESSURE: 116 MMHG | WEIGHT: 274.4 LBS

## 2023-12-11 DIAGNOSIS — I21.4 NSTEMI (NON-ST ELEVATED MYOCARDIAL INFARCTION): ICD-10-CM

## 2023-12-11 DIAGNOSIS — I10 PRIMARY HYPERTENSION: ICD-10-CM

## 2023-12-11 DIAGNOSIS — E78.2 MIXED HYPERLIPIDEMIA: Primary | ICD-10-CM

## 2023-12-11 PROCEDURE — 3074F SYST BP LT 130 MM HG: CPT | Performed by: INTERNAL MEDICINE

## 2023-12-11 PROCEDURE — 3078F DIAST BP <80 MM HG: CPT | Performed by: INTERNAL MEDICINE

## 2023-12-11 PROCEDURE — 99213 OFFICE O/P EST LOW 20 MIN: CPT | Performed by: INTERNAL MEDICINE

## 2023-12-11 NOTE — PROGRESS NOTES
[facility] CARDIOLOGY  [address] [facility] [address] [place] [address]  Phone: [phone]  Fax: [phone]    12/11/2023    Chief Complaint   Patient presents with    Follow-up     Pt denies CP, no other cardiac symptoms    Med Review     No side effects tolerating medications well.        History:     Delmar Denise Sr. is a 63 y.o. male presenting for  follow-up evaluation   Clinically stable from cardiac standpoint   Symptoms:  CP no  SOB no    Orthopnea No  Lower extremity edema no   Palpitations no   Compliant with medications yes  Claudication no      Past Medical History:   Diagnosis Date    Arthritis     Back pain     Coronary artery disease involving native coronary artery of native heart without angina pectoris s/p RCA PCI in 6/2022 8/6/2022    Degenerative disc disease, lumbar     Mixed hyperlipidemia 8/6/2022       Past Surgical History:   Procedure Laterality Date    ABDOMINAL SURGERY      CARDIAC CATHETERIZATION N/A 6/29/2022    Procedure: Left Heart Cath;  Surgeon: Donovan Swartz MD;  Location: [facility] CATH INVASIVE LOCATION;  Service: Cardiology;  Laterality: N/A;    CARDIAC CATHETERIZATION N/A 6/29/2022    Procedure: Percutaneous Coronary Intervention;  Surgeon: Donovan Swartz MD;  Location: [facility] CATH INVASIVE LOCATION;  Service: Cardiology;  Laterality: N/A;    COLONOSCOPY      HEAD/NECK LESION/CYST EXCISION Left 8/25/2020    Procedure: Excision lipoma left posterior scalp;  Surgeon: Sravani Celaya MD;  Location: [facility] OR;  Service: General;  Laterality: Left;    JOINT REPLACEMENT Left     KNEE SURGERY Bilateral     KNEE SURGERY      bilateraly    SHOULDER SURGERY      SHOULDER SURGERY      bilateral        Past Social History:  Social History     Socioeconomic History    Marital status:    Tobacco Use    Smoking status: Never    Smokeless tobacco: Former     Types: Snuff   Vaping Use    Vaping Use: Never used   Substance and Sexual  "Activity    Alcohol use: Yes     Comment: occasionally    Drug use: No    Sexual activity: Defer     Partners: Female     Birth control/protection: None       Past Family History:  Family History   Adopted: Yes   Family history unknown: Yes       Review of Systems:   ROS       Current Outpatient Medications   Medication Sig Dispense Refill    aspirin 81 MG EC tablet Take 1 tablet by mouth Daily. 90 tablet 0    atorvastatin (LIPITOR) 80 MG tablet Take 1 tablet by mouth Every Night for 120 days. 30 tablet 3    HYDROcodone-acetaminophen (NORCO) 7.5-325 MG per tablet Take 1 tablet by mouth Every Night.      losartan (COZAAR) 25 MG tablet Take 1 tablet by mouth Daily for 30 days. 30 tablet 0    metoprolol tartrate (LOPRESSOR) 25 MG tablet Take 1 tablet by mouth Every 12 (Twelve) Hours. 90 tablet 3    nitroglycerin (NITROSTAT) 0.4 MG SL tablet Place 1 tablet under the tongue Every 5 (Five) Minutes As Needed for Chest Pain (Only if SBP Greater Than 100). Take no more than 3 doses in 15 minutes. 30 tablet 12    gabapentin (NEURONTIN) 600 MG tablet Take 1 tablet by mouth 2 (Two) Times a Day.      isosorbide mononitrate (IMDUR) 30 MG 24 hr tablet Take 1 tablet by mouth Daily. 30 tablet 0    ranolazine (RANEXA) 500 MG 12 hr tablet Take 1 tablet by mouth Every 12 (Twelve) Hours. 180 tablet 3    ticagrelor (Brilinta) 60 MG tablet tablet Take 1 tablet by mouth 2 (Two) Times a Day. 180 tablet 3     No current facility-administered medications for this visit.        No Known Allergies    Objective     /71   Pulse 66   Ht 185.4 cm (73\")   Wt 124 kg (274 lb 6.4 oz)   SpO2 98%   BMI 36.20 kg/m²     Physical Exam       DATA:   Results for orders placed during the hospital encounter of 09/21/23    Adult Transthoracic Echo Complete w/ Color, Spectral and Contrast if necessary per protocol    Interpretation Summary    Left ventricular systolic function is normal. Left ventricular ejection fraction appears to be 51 - 55%.    " Left ventricular diastolic function is consistent with (grade I) impaired relaxation.    There is no evidence of pericardial effusion   Results for orders placed during the hospital encounter of 09/21/23    Stress Test With Myocardial Perfusion One Day    Interpretation Summary    Myocardial perfusion imaging indicates a small-sized infarct located in the inferior wall with no significant ischemia noted.    Left ventricular ejection fraction is normal (Calculated EF = 60%).    Abnormal LV wall motion consistent with mild hypokineses of the inferior wall.    Impressions are consistent with an intermediate risk study.    Diaphragmatic attenuation artifact is present.    Findings consistent with a normal ECG stress test.   Results for orders placed during the hospital encounter of 09/21/23    Stress Test With Myocardial Perfusion One Day    Interpretation Summary    Myocardial perfusion imaging indicates a small-sized infarct located in the inferior wall with no significant ischemia noted.    Left ventricular ejection fraction is normal (Calculated EF = 60%).    Abnormal LV wall motion consistent with mild hypokineses of the inferior wall.    Impressions are consistent with an intermediate risk study.    Diaphragmatic attenuation artifact is present.    Findings consistent with a normal ECG stress test.   Results for orders placed during the hospital encounter of 06/29/22    Cardiac Catheterization/Vascular Study    Narrative  Images from the original result were not included.  CARDIAC CATHETERIZATION / INTERVENTION REPORT            DATE OF PROCEDURE: 6/29/2022      INDICATION FOR PROCEDURE: NSTEMI      PRE PROCEDURE DIAGNOSIS:  NSTEMI  HTN    POST PROCEDURE DIAGNOSIS:  CAD proximal % occlusion s/p PCI with 3.5 x 18 MATTHEW post dilated with 4.5 NC balloon at high pressure    Face to face mdoerate conscious  sedation time :      COMPLICATIONS : None    Specimens collected : None    PROCEDURE PERFORMED:    1.  Selective right and left Coronary Angiogram  2. Left heart catheretization  3. Left Ventriculography  4. PCI of RCA  5. IVUS of RCA  6.  Manual aspiration thrombectomy of RCA    Description of the procedure:  Prior to the procedure risk, benefits and possible alternative were discussed with the patient and informed consent was obtained. Patient was brought to cardiac cath lab table in post absorbtive state. Patient was prepped and drape in usual sterile fashion. IV Versed and Fentanyl was used for moderate sedation. 2% Lidocaine was used for topical anesthesia. R radial arterial site was prepped and a micropuncture needle was used to access the artery and a 5 F slender sheath was placed. 2.5 mg of Verapamil and 200 mcg of NTG was given through the sheath intra arterial and 5000 units of Heparin was given once the catheter crossed the aortic arch.    5 F TIG 4 catheters was used for right and left coronary angiogram and 5 F pigtail catheter was used for Left heart hemodynamics and left ventriculography. All the catheters were exchanged over 0.035 wire. The R radial arterial sheath was removed and TR band was applied and immediate and complete hemostasis was achieved. The patient tolerate the entire procedure well without any immediate known complications.    Coronary anatomy findings:    LM: Is a large calibre vessel , normal take off from left cusp, divides into LAD and Lcx. No significant stenosis noted    LAD: Large calibre vessel, mid after origin of D1 had mild 40% stenosis, no other significant stenosis noted, D1 patent    LCX: Moderate calibre vessel, mild luminal irregularities. Large hign OM1 artery with no stenosis, small OM2 artery which had about 40-50% stenosis in proximal portion.    RCA: Large calibre, dominant artery, normal take off from right cusp.  100% occluded in proximal after take of conus branch s/p PCI with LILIAN 3 flow, mid had mild 30-40% stenosis, RPDA and PL branches had no stenosis.  .    Left Ventriculography:    LV systolic function was mildly reduced  with visual estimated EF of 45-50% with inferior wall hypokinesia.  No significant mitral regurgitation noted.    LVEDP: 21 mmHg  No gradient across the aortic valve on pull back.      PERCUTANEOUS CORONARY INTERVENTION PROCEDURE NOTE:    The patient did not have any ST elevations on presentation, he already had collaterals supplying the distal RPDA and PL, I did asked the patient regarding symptom onset and he was very confident that his first ever chest pain was last night which is less than 24 hours so I decided to try to wire and do angioplasty to see if occlusion is more of an acute rather than subacute.    Heparin monotherapy was used for anticoagulation.  Total of 10,000 Units was given IV. Pt also received single bolus integrelin    6 F JR 4 guide was used to engage the RCA coaxially.    0.014 Runthrough guidewire was used to cross the lesion and parked distally.  There was no challenges in wiring the artery likely an acute occlusion.    There was significant clot burden even after wiring of the RCA so used [employer] LP manual aspiration catheter and significant red thrombi was extracted which actually did establish a good LILIAN-3 flow and 90% stenosis at the plaque rupture site.    Used a 3.0 x 15 compliant TREK balloon to predilate the lesion at 20 linda.    Prepped a 3.5 x 18 Gladis Drug eluting stent and position at the lesion and successfully deployed at 16 linda.  The stent did appear to be undersized for the more distal portion of the stent and the distal reference artery diameter was at least 5 mm from IVUS, I then used a 4.5 NC balloon to postdilated 20 linda.    Post stent deployment angio pictures showed good expansion with 0% residual stenosis, LILIAN 3 flow in the distal vascular bed and no dissection or distal wire perforation.    Lesion length: 15 mm  Pre PCI Stenosis: 100 %  Post PCI stenosis: 0 %  Pre PCI LILIAN flow: 0  Post PCI LILIAN  "flow: 3    EBL: Less than 10cc              Recommendations:  Aggressive guideline directed medical management for CAD  Dual Anti platelet medication with Asa 81 mg qd and Brilinta 90 mg bid for minimum 1 year.            Donovan Swartz MD, [facility]  Interventional Cardiology    06/29/22  12:39 EDT    Procedures     Lab Results   Component Value Date    CHOL 81 09/21/2023    CHOL 118 06/29/2022     Lab Results   Component Value Date    TRIG 87 09/21/2023    TRIG 59 06/29/2022     Lab Results   Component Value Date    HDL 52 09/21/2023    HDL 50 06/29/2022     Lab Results   Component Value Date    LDL 12 09/21/2023    LDL 55 06/29/2022       Lab Results   Component Value Date    TSH 0.791 09/21/2023               Invalid input(s): \"LABALBU\", \"PROT\"        Assessment and Plan    Assessment and Plan    Problem List Items Addressed This Visit          Cardiac and Vasculature    NSTEMI (non-ST elevated myocardial infarction)    Mixed hyperlipidemia - Primary    Primary hypertension           Recommended increase activity to 30 minutes of walking daily, most days of the week    Thank you for allowing me to participate in the care of Delmar Denise Sr.. Feel free to contact me directly with any further questions or concerns.          Donovan Swartz MD, [facility]  Interventional Cardiology    "

## 2024-01-03 RX ORDER — RANOLAZINE 500 MG/1
500 TABLET, EXTENDED RELEASE ORAL EVERY 12 HOURS SCHEDULED
Qty: 180 TABLET | Refills: 3 | Status: SHIPPED | OUTPATIENT
Start: 2024-01-03

## 2024-01-03 NOTE — TELEPHONE ENCOUNTER
Caller: Monae Denise    Relationship: Emergency Contact    Best call back number: [phone]     Requested Prescriptions:   Requested Prescriptions     Pending Prescriptions Disp Refills    ticagrelor (Brilinta) 60 MG tablet tablet 180 tablet 3     Sig: Take 1 tablet by mouth 2 (Two) Times a Day.    ranolazine (RANEXA) 500 MG 12 hr tablet 180 tablet 3     Sig: Take 1 tablet by mouth Every 12 (Twelve) Hours.        Pharmacy where request should be sent: [facility], [address] [phone] [facility] [phone]      Last office visit with prescribing clinician: 12/11/2023   Last telemedicine visit with prescribing clinician: Visit date not found   Next office visit with prescribing clinician: Visit date not found     Does the patient have less than a 3 day supply:  [] Yes  [x] No    Would you like a call back once the refill request has been completed: [x] Yes [] No    If the office needs to give you a call back, can they leave a voicemail: [x] Yes [] No    Shravan Reno Rep   01/03/24 11:28 EST

## 2024-02-05 ENCOUNTER — TELEPHONE (OUTPATIENT)
Dept: CARDIOLOGY | Facility: CLINIC | Age: 64
End: 2024-02-05

## 2024-07-25 ENCOUNTER — OFFICE VISIT (OUTPATIENT)
Dept: CARDIOLOGY | Facility: CLINIC | Age: 64
End: 2024-07-25
Payer: MEDICARE

## 2024-07-25 VITALS
HEIGHT: 73 IN | HEART RATE: 67 BPM | WEIGHT: 269 LBS | DIASTOLIC BLOOD PRESSURE: 79 MMHG | SYSTOLIC BLOOD PRESSURE: 128 MMHG | BODY MASS INDEX: 35.65 KG/M2 | OXYGEN SATURATION: 96 %

## 2024-07-25 DIAGNOSIS — I25.10 CORONARY ARTERY DISEASE INVOLVING NATIVE CORONARY ARTERY OF NATIVE HEART WITHOUT ANGINA PECTORIS: Primary | ICD-10-CM

## 2024-07-25 DIAGNOSIS — E78.2 MIXED HYPERLIPIDEMIA: Chronic | ICD-10-CM

## 2024-07-25 DIAGNOSIS — I10 PRIMARY HYPERTENSION: Chronic | ICD-10-CM

## 2024-07-25 PROCEDURE — 99214 OFFICE O/P EST MOD 30 MIN: CPT | Performed by: NURSE PRACTITIONER

## 2024-07-25 PROCEDURE — 1160F RVW MEDS BY RX/DR IN RCRD: CPT | Performed by: NURSE PRACTITIONER

## 2024-07-25 PROCEDURE — 3078F DIAST BP <80 MM HG: CPT | Performed by: NURSE PRACTITIONER

## 2024-07-25 PROCEDURE — 3074F SYST BP LT 130 MM HG: CPT | Performed by: NURSE PRACTITIONER

## 2024-07-25 PROCEDURE — 1159F MED LIST DOCD IN RCRD: CPT | Performed by: NURSE PRACTITIONER

## 2024-07-25 PROCEDURE — 93000 ELECTROCARDIOGRAM COMPLETE: CPT | Performed by: NURSE PRACTITIONER

## 2024-07-25 NOTE — PROGRESS NOTES
"Chief Complaint  Follow-up (Patient denies chest pain  , soa . Patient states having back pain )    Subjective          Delmar Denise Sr. presents to [facility] CARDIOLOGY for follow up.    History of Present Illness  Mr. Denise presents for follow-up of ASCVD, hypertension, and hyperlipidemia.  His last visit to clinic was 12/11/2023 with Dr. Leonard.  At that time it was recommended that he increased activity to 30 minutes of walking daily, most days of the week.    He reports that he is not having any chest pain, palpitations, or shortness of breath.  He is taking all of his medications as prescribed without troublesome side effects.    His blood pressure is at goal today.  His last lipid panel revealed excellent control of his LDL.  This will need to be reevaluated to ensure continued control.  Tobacco Use: Medium Risk (7/28/2024)    Patient History     Smoking Tobacco Use: Never     Smokeless Tobacco Use: Former     Passive Exposure: Not on file       Objective     Vital Signs:   /79 (BP Location: Left arm, Patient Position: Sitting, Cuff Size: Large Adult)   Pulse 67   Ht 185.4 cm (73\")   Wt 122 kg (269 lb)   SpO2 96%   BMI 35.49 kg/m²       Physical Exam     Result Review :   The following data was reviewed by: MATTHEW Amezcua on 07/25/2024:  Common labs          9/21/2023    12:32 9/21/2023    14:39 9/22/2023    02:11   Common Labs   Glucose 104   109    BUN 22   24    Creatinine 1.22   0.98    Sodium 139   140    Potassium 3.8   4.2    Chloride 104   107    Calcium 9.3   9.1    Albumin 4.2      Total Bilirubin 0.7      Alkaline Phosphatase 90      AST (SGOT) 22      ALT (SGPT) 18      WBC 8.68   7.42    Hemoglobin 13.4   11.9    Hematocrit 41.3   37.6    Platelets 230   189    Total Cholesterol  81     Triglycerides  87     HDL Cholesterol  52     LDL Cholesterol   12     Hemoglobin A1C 5.90        Lipid Panel          9/21/2023    14:39   Lipid Panel   Total Cholesterol 81  "   Triglycerides 87    HDL Cholesterol 52    VLDL Cholesterol 17    LDL Cholesterol  12    LDL/HDL Ratio 0.22      Data reviewed : Cardiology studies as detailed below      Last Cardiac Cath  Results for orders placed during the hospital encounter of 06/29/22    Cardiac Catheterization/Vascular Study    Narrative  Images from the original result were not included.  CARDIAC CATHETERIZATION / INTERVENTION REPORT            DATE OF PROCEDURE: 6/29/2022      INDICATION FOR PROCEDURE: NSTEMI      PRE PROCEDURE DIAGNOSIS:  NSTEMI  HTN    POST PROCEDURE DIAGNOSIS:  CAD proximal % occlusion s/p PCI with 3.5 x 18 MATTHEW post dilated with 4.5 NC balloon at high pressure    Face to face mdoerate conscious  sedation time :      COMPLICATIONS : None    Specimens collected : None    PROCEDURE PERFORMED:    1. Selective right and left Coronary Angiogram  2. Left heart catheretization  3. Left Ventriculography  4. PCI of RCA  5. IVUS of RCA  6.  Manual aspiration thrombectomy of RCA    Description of the procedure:  Prior to the procedure risk, benefits and possible alternative were discussed with the patient and informed consent was obtained. Patient was brought to cardiac cath lab table in post absorbtive state. Patient was prepped and drape in usual sterile fashion. IV Versed and Fentanyl was used for moderate sedation. 2% Lidocaine was used for topical anesthesia. R radial arterial site was prepped and a micropuncture needle was used to access the artery and a 5 F slender sheath was placed. 2.5 mg of Verapamil and 200 mcg of NTG was given through the sheath intra arterial and 5000 units of Heparin was given once the catheter crossed the aortic arch.    5 F TIG 4 catheters was used for right and left coronary angiogram and 5 F pigtail catheter was used for Left heart hemodynamics and left ventriculography. All the catheters were exchanged over 0.035 wire. The R radial arterial sheath was removed and TR band was applied and  immediate and complete hemostasis was achieved. The patient tolerate the entire procedure well without any immediate known complications.    Coronary anatomy findings:    LM: Is a large calibre vessel , normal take off from left cusp, divides into LAD and Lcx. No significant stenosis noted    LAD: Large calibre vessel, mid after origin of D1 had mild 40% stenosis, no other significant stenosis noted, D1 patent    LCX: Moderate calibre vessel, mild luminal irregularities. Large hign OM1 artery with no stenosis, small OM2 artery which had about 40-50% stenosis in proximal portion.    RCA: Large calibre, dominant artery, normal take off from right cusp.  100% occluded in proximal after take of conus branch s/p PCI with LILIAN 3 flow, mid had mild 30-40% stenosis, RPDA and PL branches had no stenosis. .    Left Ventriculography:    LV systolic function was mildly reduced  with visual estimated EF of 45-50% with inferior wall hypokinesia.  No significant mitral regurgitation noted.    LVEDP: 21 mmHg  No gradient across the aortic valve on pull back.      PERCUTANEOUS CORONARY INTERVENTION PROCEDURE NOTE:    The patient did not have any ST elevations on presentation, he already had collaterals supplying the distal RPDA and PL, I did asked the patient regarding symptom onset and he was very confident that his first ever chest pain was last night which is less than 24 hours so I decided to try to wire and do angioplasty to see if occlusion is more of an acute rather than subacute.    Heparin monotherapy was used for anticoagulation.  Total of 10,000 Units was given IV. Pt also received single bolus integrelin    6 F JR 4 guide was used to engage the RCA coaxially.    0.014 Runthrough guidewire was used to cross the lesion and parked distally.  There was no challenges in wiring the artery likely an acute occlusion.    There was significant clot burden even after wiring of the RCA so used Arrowsight[employer] LP manual aspiration catheter  and significant red thrombi was extracted which actually did establish a good LILIAN-3 flow and 90% stenosis at the plaque rupture site.    Used a 3.0 x 15 compliant TREK balloon to predilate the lesion at 20 linda.    Prepped a 3.5 x 18 Gladis Drug eluting stent and position at the lesion and successfully deployed at 16 linda.  The stent did appear to be undersized for the more distal portion of the stent and the distal reference artery diameter was at least 5 mm from IVUS, I then used a 4.5 NC balloon to postdilated 20 linda.    Post stent deployment angio pictures showed good expansion with 0% residual stenosis, LILIAN 3 flow in the distal vascular bed and no dissection or distal wire perforation.    Lesion length: 15 mm  Pre PCI Stenosis: 100 %  Post PCI stenosis: 0 %  Pre PCI LILIAN flow: 0  Post PCI LILIAN flow: 3    EBL: Less than 10cc              Recommendations:  Aggressive guideline directed medical management for CAD  Dual Anti platelet medication with Asa 81 mg qd and Brilinta 90 mg bid for minimum 1 year.            Donovan Swartz MD, [facility]  Interventional Cardiology    06/29/22  12:39 EDT      Last Stress test  Results for orders placed during the hospital encounter of 09/21/23    Stress Test With Myocardial Perfusion One Day    Interpretation Summary    Myocardial perfusion imaging indicates a small-sized infarct located in the inferior wall with no significant ischemia noted.    Left ventricular ejection fraction is normal (Calculated EF = 60%).    Abnormal LV wall motion consistent with mild hypokineses of the inferior wall.    Impressions are consistent with an intermediate risk study.    Diaphragmatic attenuation artifact is present.    Findings consistent with a normal ECG stress test.       Last Echo  Results for orders placed during the hospital encounter of 09/21/23    Adult Transthoracic Echo Complete w/ Color, Spectral and Contrast if necessary per protocol    Interpretation Summary    Left  ventricular systolic function is normal. Left ventricular ejection fraction appears to be 51 - 55%.    Left ventricular diastolic function is consistent with (grade I) impaired relaxation.    There is no evidence of pericardial effusion         ECG 12 Lead    Date/Time: 7/25/2024 7:30 PM  Performed by: Keisha Dial APRN    Authorized by: Keisha Dial APRN  Comparison: compared with previous ECG from 9/20/2023  Comparison to previous ECG: Sinus rhythm  Rhythm: sinus rhythm  Rate: normal  BPM: 70  QRS axis: left    Clinical impression: non-specific ECG           Current Outpatient Medications   Medication Sig Dispense Refill    aspirin 81 MG EC tablet Take 1 tablet by mouth Daily. 90 tablet 0    HYDROcodone-acetaminophen (NORCO) 7.5-325 MG per tablet Take 1 tablet by mouth Every Night.      metoprolol tartrate (LOPRESSOR) 25 MG tablet Take 1 tablet by mouth Every 12 (Twelve) Hours. 180 tablet 1    nitroglycerin (NITROSTAT) 0.4 MG SL tablet Place 1 tablet under the tongue Every 5 (Five) Minutes As Needed for Chest Pain (Only if SBP Greater Than 100). Take no more than 3 doses in 15 minutes. 30 tablet 12     No current facility-administered medications for this visit.            Assessment and Plan    Problem List Items Addressed This Visit       Mixed hyperlipidemia (Chronic)    Overview     09/21/2023-total cholesterol 81, triglycerides 87, HDL 52, LDL 12         Relevant Orders    Lipid Panel    Primary hypertension (Chronic)    Coronary artery disease involving native coronary artery of native heart without angina pectoris s/p RCA PCI in 6/2022  - Primary (Chronic)    Relevant Orders    Ambulatory Referral to Disease State Management    Comprehensive Metabolic Panel    CBC (No Diff)    ECG 12 Lead     Diagnoses and all orders for this visit:    1. Coronary artery disease involving native coronary artery of native heart without angina pectoris s/p RCA PCI in 6/2022  (Primary)  -     Ambulatory Referral to  Disease State Management  -     Comprehensive Metabolic Panel; Future  -     CBC (No Diff); Future  -     ECG 12 Lead    2. Primary hypertension    3. Mixed hyperlipidemia  -     Lipid Panel; Future            Follow Up     Return in about 6 months (around 1/25/2025).    Patient was given instructions and counseling regarding his condition or for health maintenance advice. Please see specific information pulled into the AVS if appropriate.       Electronically signed by MATTHEW Amezcua, 07/28/24, 7:29 PM EDT.      Dictated Utilizing Dragon Dictation: Part of this note may be an electronic transcription/translation of spoken language to printed text using the Dragon Dictation System

## 2024-07-26 ENCOUNTER — TELEPHONE (OUTPATIENT)
Dept: CARDIOLOGY | Facility: CLINIC | Age: 64
End: 2024-07-26
Payer: MEDICARE

## 2024-07-26 NOTE — TELEPHONE ENCOUNTER
Patient is asking if he was prescribed Ozempic, there is no mention of it be prescribed in his last office note please confirm. Thanks

## 2024-07-26 NOTE — TELEPHONE ENCOUNTER
Caller: Monae Denise    Relationship: Emergency Contact    Best call back number: [phone], CAN LEAVE VM    What is the best time to reach you: ANYTIME    Who are you requesting to speak with (clinical staff, provider,  specific staff member): CLINICAL    What was the call regarding: PT'S WIFE VIRI SAID PT WAS PRESCRIBED [id number] AND WANTED OT KNOW WHEN IT IS READY FOR PICKUP. NO MENTION OF THIS MEDICATION IN CHART OR OFFICE NOTE. PLEASE CALL BACK VIRI TO DISCUSS.

## 2024-07-28 PROBLEM — E78.2 MIXED HYPERLIPIDEMIA: Chronic | Status: ACTIVE | Noted: 2022-08-06

## 2024-07-28 PROBLEM — I10 PRIMARY HYPERTENSION: Chronic | Status: ACTIVE | Noted: 2022-08-06

## 2024-07-28 PROBLEM — I21.4 NSTEMI (NON-ST ELEVATED MYOCARDIAL INFARCTION): Status: RESOLVED | Noted: 2022-06-29 | Resolved: 2024-07-28

## 2024-07-28 PROBLEM — I20.0 UNSTABLE ANGINA: Status: RESOLVED | Noted: 2023-09-21 | Resolved: 2024-07-28

## 2024-07-28 PROBLEM — I25.10 CORONARY ARTERY DISEASE INVOLVING NATIVE CORONARY ARTERY OF NATIVE HEART WITHOUT ANGINA PECTORIS: Chronic | Status: ACTIVE | Noted: 2022-08-06

## 2024-07-28 PROBLEM — Z48.89 SUTURE CHECK: Status: RESOLVED | Noted: 2017-01-03 | Resolved: 2024-07-28

## 2024-07-29 ENCOUNTER — TELEPHONE (OUTPATIENT)
Dept: CARDIOLOGY | Facility: CLINIC | Age: 64
End: 2024-07-29
Payer: MEDICARE

## 2024-07-29 NOTE — TELEPHONE ENCOUNTER
Patient's spouse called in to ask about patient's ozempic injections. Explained to her that patient was referred to medication management clinic and that they would be calling them regarding appointment. She voiced understanding of this.

## 2024-08-01 NOTE — TELEPHONE ENCOUNTER
Morales Brown in the [facility] and she will reach out to the patient to let them know what is approved.

## 2024-08-02 ENCOUNTER — SPECIALTY PHARMACY (OUTPATIENT)
Dept: PHARMACY | Facility: HOSPITAL | Age: 64
End: 2024-08-02
Payer: MEDICARE

## 2024-08-02 ENCOUNTER — DISEASE STATE MANAGEMENT VISIT (OUTPATIENT)
Dept: PHARMACY | Facility: HOSPITAL | Age: 64
End: 2024-08-02
Payer: MEDICARE

## 2024-08-02 VITALS
HEART RATE: 70 BPM | HEIGHT: 73 IN | BODY MASS INDEX: 35.7 KG/M2 | DIASTOLIC BLOOD PRESSURE: 79 MMHG | WEIGHT: 269.4 LBS | SYSTOLIC BLOOD PRESSURE: 136 MMHG

## 2024-08-02 RX ORDER — SEMAGLUTIDE 0.25 MG/.5ML
0.25 INJECTION, SOLUTION SUBCUTANEOUS WEEKLY
Qty: 2 ML | Refills: 0 | Status: SHIPPED | OUTPATIENT
Start: 2024-08-02

## 2024-08-02 RX ORDER — ATORVASTATIN CALCIUM 80 MG/1
80 TABLET, FILM COATED ORAL NIGHTLY
COMMUNITY
Start: 2022-06-29

## 2024-08-02 RX ORDER — HYDROCODONE BITARTRATE AND ACETAMINOPHEN 10; 325 MG/1; MG/1
1 TABLET ORAL NIGHTLY
COMMUNITY
Start: 2024-07-23

## 2024-08-02 NOTE — PROGRESS NOTES
[facility]  Weight Management Program      Delmar Denise Sr. is a 64 y.o. male referred to the [facility] by Keisha Dial for clinical pharmacy and specialty pharmacy management of GLP1 for weight management/ASCVD.  The patient denies a personal history of thyroid cancer and denies a personal history of pancreatitis.   Unfortunately, patient is unaware of family history as he reports he was adopted. Screening for GLP-1 use pertinent to family history cannot be completed.     Delmar Denise Sr. has previously tried calorie counting and lifestyle changes for weight loss. Current weight loss efforts include calorie coutning.     Relevant Past Medical History and Co-morbidities  Past Medical History:   Diagnosis Date    Arthritis     Back pain     Coronary artery disease involving native coronary artery of native heart without angina pectoris s/p RCA PCI in 6/2022 08/06/2022    Degenerative disc disease, lumbar     Mixed hyperlipidemia 08/06/2022    NSTEMI (non-ST elevated myocardial infarction) 06/29/2022     Social History     Socioeconomic History    Marital status:    Tobacco Use    Smoking status: Never    Smokeless tobacco: Former     Types: Snuff   Vaping Use    Vaping status: Never Used   Substance and Sexual Activity    Alcohol use: Yes     Comment: occasionally    Drug use: No    Sexual activity: Defer     Partners: Female     Birth control/protection: None       Allergies  Patient has no known allergies.    Current Medication List    Current Outpatient Medications:     aspirin 81 MG EC tablet, Take 1 tablet by mouth Daily., Disp: 90 tablet, Rfl: 0    atorvastatin (LIPITOR) 80 MG tablet, Take 1 tablet by mouth Every Night., Disp: , Rfl:     HYDROcodone-acetaminophen (NORCO)  MG per tablet, Take 1 tablet by mouth Every Night., Disp: , Rfl:     metoprolol tartrate (LOPRESSOR) 25 MG tablet, Take 1 tablet by mouth Every 12 (Twelve) Hours., Disp: 180 tablet,  "Rfl: 1    nitroglycerin (NITROSTAT) 0.4 MG SL tablet, Place 1 tablet under the tongue Every 5 (Five) Minutes As Needed for Chest Pain (Only if SBP Greater Than 100). Take no more than 3 doses in 15 minutes., Disp: 30 tablet, Rfl: 12    Semaglutide-Weight Management (Wegovy) 0.25 MG/0.5ML solution auto-injector, Inject 0.5 mL under the skin into the appropriate area as directed 1 (One) Time Per Week., Disp: 2 mL, Rfl: 0  Medications that contribute to weight gain: None  Drug Interactions  Wegovy+ ASA: Increases risk for hypoglycemia  Weovy + Lopressor: Increases risk for hypoglycemia    Relevant Laboratory Values  Lab Results   Component Value Date    CHOL 81 09/21/2023    TRIG 87 09/21/2023    HDL 52 09/21/2023    LDL 12 09/21/2023     Lab Results   Component Value Date    GLUCOSE 109 (H) 09/22/2023    BUN 24 (H) 09/22/2023    CREATININE 0.98 09/22/2023    EGFRIFNONA 77 08/21/2020    BCR 24.5 09/22/2023    K 4.2 09/22/2023    CO2 26.7 09/22/2023    CALCIUM 9.1 09/22/2023    ALBUMIN 4.2 09/21/2023    AST 22 09/21/2023    ALT 18 09/21/2023     Body mass index is 35.54 kg/m².  Lab Results   Component Value Date    TSH 0.791 09/21/2023       Vaccinations:   Patient recommended to keep up with routine vaccinations.     Goals of Therapy  Clinical Goals or Therapeutic Targets: Prevent weight associated co-morbidities and complications      Date 8/2/24         Weight (lb) 269.4 lb       BMI kg/ 35.49       Waist Circumference (in)  52.75\"           Medication Assessment & Plan    Patient has current BMI of 35.49, which is considered Obesity.       Will order Wegovy 0.25 mg SC weekly.      Of note, reached out to Keisha Dial to make sure she was aware that patient did not have diabetes and therefore Ozempic wouldn't be covered, however, if wanting for ASCVD that Wegovy would be. She reported she wanted Wegovy. She is also aware that GLP-1 family history screening could not be completed because the patient reports he is " adopted and he is unsure of the answers. Katharine Dial wanted to proceed with Wegovy therapy.  Patient was also explained of these potential severe side effects and why family history is asked.     The following medications will need dose adjustments/closer monitoring once the GLP1 is started: NA    Discussed potential for increase risk of hypoglycemia with mediations. Patient aware to monitor for s/sx and rule of 15 discussed. Patient verbally acknowledged understanding.     Discussed lifestyle modifications, including diet and exercise.  Patient education provided.     Worked with patient to create SMART Goal(s):   Maintain water intake of at least 64 oz per day  Eliminate late night eating 3 nights a week (currently reports 7 nights of late eating)      Discuss below at next visit:    Aim for protein in take of 1.5g/kg/day    Strength Training: Target all major muscle groups, 8-12 repetitions for each exercise and at least 2 x a week on non-consecutive days. With a total weekly target of 60 minutes/week.     Patient was counseled that they will need to discuss holding medication 2-4 weeks prior to colonoscopies or surgical procedures with their surgery physician due to risk of aspiration and/or incomplete bowel prep.      Will follow-up with patient in clinic in 4 weeks.     Narcisa Pettit. Clarence, PharmD  8/2/2024  10:25 EDT

## 2024-08-02 NOTE — PROGRESS NOTES
[facility]  Weight Management Program      Delmar Denise Sr. is a 64 y.o. male referred to the [facility] by Keisha Dial for clinical pharmacy and specialty pharmacy management of GLP1 for weight management/ASCVD.  The patient denies a personal history of thyroid cancer and denies a personal history of pancreatitis.   Unfortunately, patient is unaware of family history as he reports he was adopted. Screening for GLP-1 use pertinent to family history cannot be completed.     Delmar Denise Sr. has previously tried calorie counting and lifestyle changes for weight loss. Current weight loss efforts include calorie coutning.     Relevant Past Medical History and Co-morbidities  Past Medical History:   Diagnosis Date    Arthritis     Back pain     Coronary artery disease involving native coronary artery of native heart without angina pectoris s/p RCA PCI in 6/2022 08/06/2022    Degenerative disc disease, lumbar     Mixed hyperlipidemia 08/06/2022    NSTEMI (non-ST elevated myocardial infarction) 06/29/2022     Social History     Socioeconomic History    Marital status:    Tobacco Use    Smoking status: Never    Smokeless tobacco: Former     Types: Snuff   Vaping Use    Vaping status: Never Used   Substance and Sexual Activity    Alcohol use: Yes     Comment: occasionally    Drug use: No    Sexual activity: Defer     Partners: Female     Birth control/protection: None       Allergies  Patient has no known allergies.    Current Medication List    Current Outpatient Medications:     aspirin 81 MG EC tablet, Take 1 tablet by mouth Daily., Disp: 90 tablet, Rfl: 0    HYDROcodone-acetaminophen (NORCO) 7.5-325 MG per tablet, Take 1 tablet by mouth Every Night., Disp: , Rfl:     metoprolol tartrate (LOPRESSOR) 25 MG tablet, Take 1 tablet by mouth Every 12 (Twelve) Hours., Disp: 180 tablet, Rfl: 1    nitroglycerin (NITROSTAT) 0.4 MG SL tablet, Place 1 tablet under the tongue Every 5  "(Five) Minutes As Needed for Chest Pain (Only if SBP Greater Than 100). Take no more than 3 doses in 15 minutes., Disp: 30 tablet, Rfl: 12  Medications that contribute to weight gain: None  Drug Interactions  Wegovy+ ASA: Increases risk for hypoglycemia  Weovy + Lopressor: Increases risk for hypoglycemia    Relevant Laboratory Values  Lab Results   Component Value Date    CHOL 81 09/21/2023    TRIG 87 09/21/2023    HDL 52 09/21/2023    LDL 12 09/21/2023     Lab Results   Component Value Date    GLUCOSE 109 (H) 09/22/2023    BUN 24 (H) 09/22/2023    CREATININE 0.98 09/22/2023    EGFRIFNONA 77 08/21/2020    BCR 24.5 09/22/2023    K 4.2 09/22/2023    CO2 26.7 09/22/2023    CALCIUM 9.1 09/22/2023    ALBUMIN 4.2 09/21/2023    AST 22 09/21/2023    ALT 18 09/21/2023     There is no height or weight on file to calculate BMI.  Lab Results   Component Value Date    TSH 0.791 09/21/2023       Vaccinations:   Patient recommended to keep up with routine vaccinations.     Goals of Therapy  Clinical Goals or Therapeutic Targets: Prevent weight associated co-morbidities and complications      Date 8/2/24         Weight (lb) 269.4 lb       BMI kg/ 35.49       Waist Circumference (in)  52.75\"           Medication Assessment & Plan    Patient has current BMI of 35.49, which is considered Obesity.       Will order Wegovy 0.25 mg SC weekly.      Of note, reached out to Keisha Dial to make sure she was aware that patient did not have diabetes and therefore Ozempic wouldn't be covered, however, if wanting for ASCVD that Wegovy would be. She reported she wanted Wegovy. She is also aware that GLP-1 family history screening could not be completed because the patient reports he is adopted and he is unsure of the answers. Katharine Dial wanted to proceed with Wegovy therapy.  Patient was also explained of these potential severe side effects and why family history is asked.     The following medications will need dose adjustments/closer " monitoring once the GLP1 is started: NA    Discussed potential for increase risk of hypoglycemia with mediations. Patient aware to monitor for s/sx and rule of 15 discussed. Patient verbally acknowledged understanding.     Discussed lifestyle modifications, including diet and exercise.  Patient education provided.     Worked with patient to create SMART Goal(s):   Maintain water intake of at least 64 oz per day  Eliminate late night eating 3 nights a week (currently reports 7 nights of late eating)      Discuss below at next visit:    Aim for protein in take of 1.5g/kg/day    Strength Training: Target all major muscle groups, 8-12 repetitions for each exercise and at least 2 x a week on non-consecutive days. With a total weekly target of 60 minutes/week.     Patient was counseled that they will need to discuss holding medication 2-4 weeks prior to colonoscopies or surgical procedures with their surgery physician due to risk of aspiration and/or incomplete bowel prep.      Will follow-up with patient in clinic in 4 weeks.     Narcisa Pettit. Clarence, PharmD  8/2/2024  09:54 EDT

## 2024-08-02 NOTE — PROGRESS NOTES
Wegovy® (semaglutide)  Medication Expectations   Why am I taking this medication? You are taking Wegovy for weight loss because you have excess weight and also have weight-related medical problems or obesity. It should be used along with a reduced calorie diet and increased physical activity.    What should I expect while on this medication? You should expect to lose at least 5% of your body weight after 3 months of therapy. It can also help keep weight lost off.    How does the medication work? Wegovy is an injection that addresses one of your body's natural responses to weight loss. It works like a naturally produced hormone in the body called GLP-1 that regulates appetite which can lead to eating fewer calories and losing weight. This medication also slows down food from leaving your stomach, making you feel munoz for longer.   How long will I be on this medication for? The amount of time you will be on this medication will be determined by your doctor. Do not abruptly stop this medication without talking to your doctor first.    How do I take this medication? Take as directed on your prescription label. Wegovy is injected under the skin (subcutaneously) of your stomach, thigh, or upper arm. This medication should be taken once weekly and can be given with or without food. Rotate injection sites weekly. If changing the day of administration is necessary, allow at least 48 hours between 2 doses.     After removal of the pen cap, the needles will be hidden inside the needle cover.  To begin injection, press the needle cover firmly against the skin.  Once injected, continue to press the device against the skin until the yellow bar has stopped moving.  Then, remove the needle from the skin.    What are some possible side effects? You may notice you don't feel as hungry, especially when you first start using Wegovy. Some common side effects include nausea, vomiting, diarrhea, vomiting, indigestion, constipation,  tiredness, and headache. Redness, itching, and/or swelling can occur where the shot was given. You should also monitor for low blood sugar (hypoglycemia), especially if you are taking Wegovy with other medications that cause low blood sugar. Stop using Wegovy and call your doctor immediately if you have severe pain in your stomach area that will not go away as this could be a sign of pancreatitis (inflammation of your pancreas).     What happens if I miss a dose? If you miss a dose, take it as soon as you remember within 5 days. Resume usual schedule thereafter.  If > 5 days have elapsed, skip the missed dose and resume administration at the next scheduled weekly dose.      Medication Safety   What are things I should warn my doctor immediately about? Do not use Wegovy if you or a family member have ever had medullary thyroid cancer (MTC) or Multiple Endocrine Neoplasia syndrome type 2 (MEN 2). Tell your doctor if you get a lump or swelling in your neck, hoarseness, difficulty swallowing, or feel short of breath (these may be symptoms of thyroid cancer). Talk to your doctor if you have or have had problems with your pancreas, kidneys, or liver. Tell your doctor if you have problems with digesting food or slowed emptying of your stomach (gastroparesis). Talk to your doctor if you are pregnant, planning to become pregnant, or breastfeeding. Also tell your doctor if you notice any signs/symptoms of an allergic reaction (rash, hives, difficulty breathing, etc.).   What are things that I should be cautious of? Be cautious of any side effects from this medication. Talk to your doctor if any new ones develop or aren't getting better.   What are some medications that can interact with this one? Taking Wegovy with other medications that lower your blood sugar such as insulins and glipizide/glimepiride/glyburide may increase the risk of low blood sugar. It should not be taken with other medicines called GLP-1 receptor  agonists, as these work the same way as Wegovy. Because Wegovy slows stomach emptying, it can affect the way some medicines work. Always tell your doctor or pharmacist immediately if you start taking any new medications, including over-the-counter medications, vitamins, and herbal supplements.      Medication Storage/Handling   How should I handle this medication? Keep this medication out of reach of pets/children and keep the pen capped when not in use. Do not share your medicine pens with others.    How does this medication need to be stored? Store your new, unused pens in the original carton in the refrigerator. Protect it from light. Do not freeze. Prior to cap removal, the pen can be kept at room temperature up to 28 days.    How should I dispose of this medication? Used Wegovy pens should be thrown after each use. Place your used pen and needle in an approved sharps container. If you do not have a sharps container, you may use a household container made of heavy-duty plastic with a tight-fitting lid that is leak resistant (e.g., heavy-duty plastic laundry detergent bottle). If your doctor decides to stop this medication, take to your local police station for proper disposal. Some pharmacies also have take-back bins for medication drop-off.       Resources/Support   How can I remind myself to take this medication? You can download reminder apps to help you manage your refills. You may also set an alarm on your phone to remind you.    Is financial support available?  [employer] can provide co-pay cards if you have commercial insurance.    Which vaccines are recommended for me? Talk to your doctor about these vaccines: Flu, Coronavirus (COVID-19), Pneumococcal (pneumonia), Tdap, Zoster (shingles)

## 2024-08-02 NOTE — PROGRESS NOTES
Wegovy® (semaglutide)  Medication Expectations   Why am I taking this medication? You are taking Wegovy for weight loss because you have excess weight and also have weight-related medical problems or obesity. It should be used along with a reduced calorie diet and increased physical activity.    What should I expect while on this medication? You should expect to lose at least 5% of your body weight after 3 months of therapy. It can also help keep weight lost off.    How does the medication work? Wegovy is an injection that addresses one of your body's natural responses to weight loss. It works like a naturally produced hormone in the body called GLP-1 that regulates appetite which can lead to eating fewer calories and losing weight. This medication also slows down food from leaving your stomach, making you feel munoz for longer.   How long will I be on this medication for? The amount of time you will be on this medication will be determined by your doctor. Do not abruptly stop this medication without talking to your doctor first.    How do I take this medication? Take as directed on your prescription label. Wegovy is injected under the skin (subcutaneously) of your stomach, thigh, or upper arm. This medication should be taken once weekly and can be given with or without food. Rotate injection sites weekly. If changing the day of administration is necessary, allow at least 48 hours between 2 doses.     After removal of the pen cap, the needles will be hidden inside the needle cover.  To begin injection, press the needle cover firmly against the skin.  Once injected, continue to press the device against the skin until the yellow bar has stopped moving.  Then, remove the needle from the skin.    What are some possible side effects? You may notice you don't feel as hungry, especially when you first start using Wegovy. Some common side effects include nausea, vomiting, diarrhea, vomiting, indigestion, constipation,  tiredness, and headache. Redness, itching, and/or swelling can occur where the shot was given. You should also monitor for low blood sugar (hypoglycemia), especially if you are taking Wegovy with other medications that cause low blood sugar. Stop using Wegovy and call your doctor immediately if you have severe pain in your stomach area that will not go away as this could be a sign of pancreatitis (inflammation of your pancreas).     What happens if I miss a dose? If you miss a dose, take it as soon as you remember within 5 days. Resume usual schedule thereafter.  If > 5 days have elapsed, skip the missed dose and resume administration at the next scheduled weekly dose.      Medication Safety   What are things I should warn my doctor immediately about? Do not use Wegovy if you or a family member have ever had medullary thyroid cancer (MTC) or Multiple Endocrine Neoplasia syndrome type 2 (MEN 2). Tell your doctor if you get a lump or swelling in your neck, hoarseness, difficulty swallowing, or feel short of breath (these may be symptoms of thyroid cancer). Talk to your doctor if you have or have had problems with your pancreas, kidneys, or liver. Tell your doctor if you have problems with digesting food or slowed emptying of your stomach (gastroparesis). Talk to your doctor if you are pregnant, planning to become pregnant, or breastfeeding. Also tell your doctor if you notice any signs/symptoms of an allergic reaction (rash, hives, difficulty breathing, etc.).   What are things that I should be cautious of? Be cautious of any side effects from this medication. Talk to your doctor if any new ones develop or aren't getting better.   What are some medications that can interact with this one? Taking Wegovy with other medications that lower your blood sugar such as insulins and glipizide/glimepiride/glyburide may increase the risk of low blood sugar. It should not be taken with other medicines called GLP-1 receptor  agonists, as these work the same way as Wegovy. Because Wegovy slows stomach emptying, it can affect the way some medicines work. Always tell your doctor or pharmacist immediately if you start taking any new medications, including over-the-counter medications, vitamins, and herbal supplements.      Medication Storage/Handling   How should I handle this medication? Keep this medication out of reach of pets/children and keep the pen capped when not in use. Do not share your medicine pens with others.    How does this medication need to be stored? Store your new, unused pens in the original carton in the refrigerator. Protect it from light. Do not freeze. Prior to cap removal, the pen can be kept at room temperature up to 28 days.    How should I dispose of this medication? Used Wegovy pens should be thrown after each use. Place your used pen and needle in an approved sharps container. If you do not have a sharps container, you may use a household container made of heavy-duty plastic with a tight-fitting lid that is leak resistant (e.g., heavy-duty plastic laundry detergent bottle). If your doctor decides to stop this medication, take to your local police station for proper disposal. Some pharmacies also have take-back bins for medication drop-off.       Resources/Support   How can I remind myself to take this medication? You can download reminder apps to help you manage your refills. You may also set an alarm on your phone to remind you.    Is financial support available?  [employer] can provide co-pay cards if you have commercial insurance.    Which vaccines are recommended for me? Talk to your doctor about these vaccines: Flu, Coronavirus (COVID-19), Pneumococcal (pneumonia), Tdap, Zoster (shingles)

## 2024-08-30 ENCOUNTER — DISEASE STATE MANAGEMENT VISIT (OUTPATIENT)
Dept: PHARMACY | Facility: HOSPITAL | Age: 64
End: 2024-08-30
Payer: MEDICARE

## 2024-08-30 VITALS
SYSTOLIC BLOOD PRESSURE: 151 MMHG | HEART RATE: 65 BPM | BODY MASS INDEX: 34.59 KG/M2 | HEIGHT: 73 IN | DIASTOLIC BLOOD PRESSURE: 92 MMHG | WEIGHT: 261 LBS

## 2024-08-30 RX ORDER — SEMAGLUTIDE 0.5 MG/.5ML
0.5 INJECTION, SOLUTION SUBCUTANEOUS WEEKLY
Qty: 2 ML | Refills: 0 | Status: SHIPPED | OUTPATIENT
Start: 2024-08-30

## 2024-08-30 NOTE — PROGRESS NOTES
[facility]  Weight Management Program      Delmar Denise Sr. is a 64 y.o. male referred to the [facility] by Keisha Dial for clinical pharmacy and specialty pharmacy management of GLP1 for weight management/ASCVD.  The patient denies a personal history of thyroid cancer and denies a personal history of pancreatitis.   Unfortunately, patient is unaware of family history as he reports he was adopted. Screening for GLP-1 use pertinent to family history cannot be completed.     Delmar Denise Sr. has previously tried calorie counting and lifestyle changes for weight loss. Current weight loss efforts include calorie coutning.     Patient is currently on Wegovy 0.25 mg weekly. Patient denies any lumps/swelling/hoarseness or severe abdominal pain. Patient denies any trouble giving the injection or missed doses. Patient reports that he has felt appetite suppression from the medication especially with night time eating. Patient reports that he hasn't wanted to eat late at night/prior to bedtime at all since being on Wegovy. Patient reports he is staying well hydrated and averages 64 oz of water a day. He is doing well with the previous mentioned SMART goals and would like to continue with these currently.    Patient denied any changes to medications or allergies.     SBP was slightly elevated today. Patient reported that he was surprised by this because this morning when he checked it at home it was 115/74. Patient reports BP has been stable on home BP checks and that he does get pain with his hip when walking longer distances such as coming into the clinic today.     Relevant Past Medical History and Co-morbidities  Past Medical History:   Diagnosis Date    Arthritis     Back pain     Coronary artery disease involving native coronary artery of native heart without angina pectoris s/p RCA PCI in 6/2022 08/06/2022    Degenerative disc disease, lumbar     Mixed hyperlipidemia  08/06/2022    NSTEMI (non-ST elevated myocardial infarction) 06/29/2022     Social History     Socioeconomic History    Marital status:    Tobacco Use    Smoking status: Never    Smokeless tobacco: Former     Types: Snuff   Vaping Use    Vaping status: Never Used   Substance and Sexual Activity    Alcohol use: Yes     Comment: occasionally    Drug use: No    Sexual activity: Defer     Partners: Female     Birth control/protection: None       Allergies  Patient has no known allergies.    Current Medication List    Current Outpatient Medications:     aspirin 81 MG EC tablet, Take 1 tablet by mouth Daily., Disp: 90 tablet, Rfl: 0    atorvastatin (LIPITOR) 80 MG tablet, Take 1 tablet by mouth Every Night., Disp: , Rfl:     HYDROcodone-acetaminophen (NORCO)  MG per tablet, Take 1 tablet by mouth Every Night., Disp: , Rfl:     metoprolol tartrate (LOPRESSOR) 25 MG tablet, Take 1 tablet by mouth Every 12 (Twelve) Hours., Disp: 180 tablet, Rfl: 1    nitroglycerin (NITROSTAT) 0.4 MG SL tablet, Place 1 tablet under the tongue Every 5 (Five) Minutes As Needed for Chest Pain (Only if SBP Greater Than 100). Take no more than 3 doses in 15 minutes., Disp: 30 tablet, Rfl: 12    Semaglutide-Weight Management (Wegovy) 0.25 MG/0.5ML solution auto-injector, Inject 0.5 mL under the skin into the appropriate area as directed 1 (One) Time Per Week., Disp: 2 mL, Rfl: 0  Medications that contribute to weight gain: None  Drug Interactions  Wegovy+ ASA: Increases risk for hypoglycemia  Weovy + Lopressor: Increases risk for hypoglycemia    Relevant Laboratory Values  Lab Results   Component Value Date    CHOL 81 09/21/2023    TRIG 87 09/21/2023    HDL 52 09/21/2023    LDL 12 09/21/2023     Lab Results   Component Value Date    GLUCOSE 109 (H) 09/22/2023    BUN 24 (H) 09/22/2023    CREATININE 0.98 09/22/2023    EGFRIFNONA 77 08/21/2020    BCR 24.5 09/22/2023    K 4.2 09/22/2023    CO2 26.7 09/22/2023    CALCIUM 9.1 09/22/2023     "ALBUMIN 4.2 09/21/2023    AST 22 09/21/2023    ALT 18 09/21/2023     There is no height or weight on file to calculate BMI.  Lab Results   Component Value Date    TSH 0.791 09/21/2023       Vaccinations:   Patient recommended to keep up with routine vaccinations.     Goals of Therapy  Clinical Goals or Therapeutic Targets: Prevent weight associated co-morbidities and complications      Date 8/2/24 8/30/24      Weight (lb) 269.4 lb 261 lb      BMI kg/ 35.49 34.43      Waist Circumference (in)  52.75\" 52\"          Medication Assessment & Plan    Patient has current BMI of 34.43. , which is considered Obesity.  Patient has lost 8.4 lbs since starting therapy. Congratulated patient on his success thus far!      Will order Wegovy 0.5 mg SC weekly.      See initial progress note regarding Keisha Dial's okay for patient to be on medication without being able to complete full screening information.     The following medications will need dose adjustments/closer monitoring once the GLP1 is started: NA    Discussed potential for increase risk of hypoglycemia with mediations. Patient aware to monitor for s/sx and rule of 15 discussed. Patient verbally acknowledged understanding.     Patient reports that he may have upcoming hip surgery and/or injections for hip issues. Patient advised to make sure all parties are aware that he is on Wegovy so that if it is needing to be held for any reason that they can advise him on this. Patient reported that he has told them he is taking the medication.     Discussed lifestyle modifications, including diet and exercise.  Patient education provided.     Worked with patient to create SMART Goal(s):   Maintain water intake of at least 64 oz per day  Eliminate late night eating 3 nights a week (currently reports 7 nights of late eating)      Discussed with patient:     Aim for protein in take of 1.5g/kg/day    Strength Training: Target all major muscle groups, 8-12 repetitions for each " exercise and at least 2 x a week on non-consecutive days. With a total weekly target of 60 minutes/week.     Patient was counseled that they will need to discuss holding medication 2-4 weeks prior to colonoscopies or surgical procedures with their surgery physician due to risk of aspiration and/or incomplete bowel prep.      Will follow-up with patient in clinic in 4 weeks.     Narcisa Pettit. Clarence, PharmJAXSON  8/30/2024  08:35 EDT

## 2024-09-27 ENCOUNTER — DISEASE STATE MANAGEMENT VISIT (OUTPATIENT)
Dept: PHARMACY | Facility: HOSPITAL | Age: 64
End: 2024-09-27
Payer: MEDICARE

## 2024-09-27 VITALS
HEART RATE: 73 BPM | DIASTOLIC BLOOD PRESSURE: 83 MMHG | BODY MASS INDEX: 34.48 KG/M2 | SYSTOLIC BLOOD PRESSURE: 147 MMHG | WEIGHT: 260.2 LBS | HEIGHT: 73 IN

## 2024-09-27 RX ORDER — SEMAGLUTIDE 1 MG/.5ML
1 INJECTION, SOLUTION SUBCUTANEOUS WEEKLY
Qty: 2 ML | Refills: 0 | Status: SHIPPED | OUTPATIENT
Start: 2024-09-27

## 2024-10-10 RX ORDER — METOPROLOL TARTRATE 25 MG/1
25 TABLET, FILM COATED ORAL EVERY 12 HOURS
Qty: 180 TABLET | Refills: 1 | Status: SHIPPED | OUTPATIENT
Start: 2024-10-10

## 2024-10-14 ENCOUNTER — OFFICE VISIT (OUTPATIENT)
Dept: FAMILY MEDICINE CLINIC | Facility: CLINIC | Age: 64
End: 2024-10-14
Payer: MEDICARE

## 2024-10-14 VITALS
WEIGHT: 263.8 LBS | HEART RATE: 91 BPM | SYSTOLIC BLOOD PRESSURE: 130 MMHG | HEIGHT: 73 IN | TEMPERATURE: 97.5 F | BODY MASS INDEX: 34.96 KG/M2 | DIASTOLIC BLOOD PRESSURE: 80 MMHG | OXYGEN SATURATION: 99 %

## 2024-10-14 DIAGNOSIS — M25.551 PAIN OF RIGHT HIP: Primary | ICD-10-CM

## 2024-10-14 DIAGNOSIS — E78.2 MIXED HYPERLIPIDEMIA: Chronic | ICD-10-CM

## 2024-10-14 DIAGNOSIS — I10 PRIMARY HYPERTENSION: Chronic | ICD-10-CM

## 2024-10-14 PROCEDURE — 3075F SYST BP GE 130 - 139MM HG: CPT | Performed by: FAMILY MEDICINE

## 2024-10-14 PROCEDURE — 1160F RVW MEDS BY RX/DR IN RCRD: CPT | Performed by: FAMILY MEDICINE

## 2024-10-14 PROCEDURE — 3079F DIAST BP 80-89 MM HG: CPT | Performed by: FAMILY MEDICINE

## 2024-10-14 PROCEDURE — 90677 PCV20 VACCINE IM: CPT | Performed by: FAMILY MEDICINE

## 2024-10-14 PROCEDURE — 1159F MED LIST DOCD IN RCRD: CPT | Performed by: FAMILY MEDICINE

## 2024-10-14 PROCEDURE — 1125F AMNT PAIN NOTED PAIN PRSNT: CPT | Performed by: FAMILY MEDICINE

## 2024-10-14 PROCEDURE — G0009 ADMIN PNEUMOCOCCAL VACCINE: HCPCS | Performed by: FAMILY MEDICINE

## 2024-10-14 PROCEDURE — 99214 OFFICE O/P EST MOD 30 MIN: CPT | Performed by: FAMILY MEDICINE

## 2024-10-14 RX ORDER — CYCLOBENZAPRINE HCL 10 MG
10 TABLET ORAL 2 TIMES DAILY PRN
Qty: 60 TABLET | Refills: 0 | Status: SHIPPED | OUTPATIENT
Start: 2024-10-14

## 2024-10-15 NOTE — PROGRESS NOTES
"Chief Complaint  Establish Care and Hyperlipidemia    Subjective          Delmar Denise Sr. presents to [facility]  Hyperlipidemia        Patient here to establish care.  He has a history of hyperlipidemia, hypertension, and coronary artery disease.  He is doing well with current medications at this time.  Complains of insomnia due to his increased hip pain.  He is seeing orthopedic about the hip pain.    Review of Systems      Objective   Vital Signs:   /80 (BP Location: Right arm, Patient Position: Sitting, Cuff Size: Large Adult)   Pulse 91   Temp 97.5 °F (36.4 °C) (Temporal)   Ht 185.4 cm (73\")   Wt 120 kg (263 lb 12.8 oz)   SpO2 99%   BMI 34.80 kg/m²     Physical Exam  Constitutional:       General: He is not in acute distress.     Appearance: Normal appearance. He is well-developed and well-groomed. He is not ill-appearing, toxic-appearing or diaphoretic.   HENT:      Head: Normocephalic.      Nose: Nose normal. No congestion or rhinorrhea.      Mouth/Throat:      Mouth: Mucous membranes are moist.      Pharynx: Oropharynx is clear. No oropharyngeal exudate or posterior oropharyngeal erythema.   Eyes:      General: Lids are normal.         Right eye: No discharge.         Left eye: No discharge.      Extraocular Movements: Extraocular movements intact.      Pupils: Pupils are equal, round, and reactive to light.   Neck:      Vascular: No carotid bruit.   Cardiovascular:      Rate and Rhythm: Normal rate and regular rhythm.      Pulses: Normal pulses.      Heart sounds: Normal heart sounds. No murmur heard.     No friction rub. No gallop.   Pulmonary:      Effort: Pulmonary effort is normal. No respiratory distress.      Breath sounds: Normal breath sounds. No stridor. No wheezing, rhonchi or rales.   Chest:      Chest wall: No tenderness.   Abdominal:      General: Bowel sounds are normal. There is no distension.      Palpations: Abdomen is soft. There is no mass. "      Tenderness: There is no abdominal tenderness. There is no right CVA tenderness, left CVA tenderness, guarding or rebound.      Hernia: No hernia is present.   Musculoskeletal:         General: No swelling or tenderness. Normal range of motion.      Cervical back: Normal range of motion and neck supple. No rigidity or tenderness.      Right hip: Tenderness present. Decreased range of motion.      Right lower leg: No edema.      Left lower leg: No edema.   Lymphadenopathy:      Cervical: No cervical adenopathy.   Skin:     General: Skin is warm.      Capillary Refill: Capillary refill takes less than 2 seconds.      Coloration: Skin is not jaundiced.      Findings: No bruising, erythema or rash.   Neurological:      General: No focal deficit present.      Mental Status: He is alert and oriented to person, place, and time.      Motor: Motor function is intact. No weakness.      Coordination: Coordination is intact.      Gait: Gait is intact. Gait normal.   Psychiatric:         Attention and Perception: Attention normal.         Mood and Affect: Mood normal.         Speech: Speech normal.         Behavior: Behavior normal.         Cognition and Memory: Cognition normal.         Judgment: Judgment normal.        Result Review :                 Assessment and Plan    Diagnoses and all orders for this visit:    1. Pain of right hip (Primary)  -     cyclobenzaprine (FLEXERIL) 10 MG tablet; Take 1 tablet by mouth 2 (Two) Times a Day As Needed for Muscle Spasms.  Dispense: 60 tablet; Refill: 0    2. Mixed hyperlipidemia  Comments:  Continue current medications    3. Primary hypertension  Comments:  Continue current medications    Other orders  -     Pneumococcal Conjugate Vaccine 20-Valent (PCV20)      Patient's Body mass index is 34.8 kg/m². indicating that he is obese (BMI >30). Obesity-related health conditions include the following: hypertension, coronary heart disease, and dyslipidemias. Obesity is unchanged. BMI is  is above average; BMI management plan is completed. We discussed low calorie, low carb based diet program, portion control, and increasing exercise..    Follow Up   Return in about 3 months (around 1/14/2025) for Next scheduled follow up.  Patient was given instructions and counseling regarding his condition or for health maintenance advice. Please see specific information pulled into the AVS if appropriate.     This document has been electronically signed by MATTHEW Gaston  October 15, 2024 10:12 EDT

## 2024-10-17 ENCOUNTER — TELEPHONE (OUTPATIENT)
Dept: FAMILY MEDICINE CLINIC | Facility: CLINIC | Age: 64
End: 2024-10-17
Payer: MEDICARE

## 2024-10-17 NOTE — TELEPHONE ENCOUNTER
10/17/2024 PA for Cyclobenzaprine (Flexeril) 10MG tablets submitted and approved from 07/18/2024 to 10/17/2025. Patient and [facility] [phone] notified.

## 2024-10-24 ENCOUNTER — DISEASE STATE MANAGEMENT VISIT (OUTPATIENT)
Dept: PHARMACY | Facility: HOSPITAL | Age: 64
End: 2024-10-24
Payer: MEDICARE

## 2024-10-24 VITALS
SYSTOLIC BLOOD PRESSURE: 126 MMHG | WEIGHT: 255 LBS | HEIGHT: 73 IN | BODY MASS INDEX: 33.8 KG/M2 | DIASTOLIC BLOOD PRESSURE: 83 MMHG | HEART RATE: 78 BPM

## 2024-10-24 DIAGNOSIS — E66.01 MORBID (SEVERE) OBESITY DUE TO EXCESS CALORIES: Primary | ICD-10-CM

## 2024-10-24 RX ORDER — SEMAGLUTIDE 1.7 MG/.75ML
1.7 INJECTION, SOLUTION SUBCUTANEOUS WEEKLY
Qty: 3 ML | Refills: 0 | Status: SHIPPED | OUTPATIENT
Start: 2024-10-24

## 2024-10-24 NOTE — PROGRESS NOTES
[facility]  Weight Management Program      Delmar Denise Sr. is a 64 y.o. male referred to the [facility] by Keisha Dial for clinical pharmacy and specialty pharmacy management of GLP1 for weight management/ASCVD.  The patient denies a personal history of thyroid cancer and denies a personal history of pancreatitis.   Unfortunately, patient is unaware of family history as he reports he was adopted. Screening for GLP-1 use pertinent to family history cannot be completed.     Delmar Denise Sr. has previously tried calorie counting and lifestyle changes for weight loss. Current weight loss efforts include calorie coutning.     Patient is currently on Wegovy 1 mg weekly. Patient denies any lumps/swelling/hoarseness in neck/throat or abdominal pain. Patient reports tolerating medication well without any side effects. Patient denies any trouble giving injection or any missed doses. Patient wishes to titrate dose at this time. Patient reports that he has not been doing well with exercise because of his hip pain. He has also not been drinking as much water lately but has cut back on eating late at night.    Patient will be having hip surgery soon but it is not scheduled yet. He is aware that he will need to let them know that he is on Wegovy and will likely need to hold prior to procedure.      Relevant Past Medical History and Co-morbidities  Past Medical History:   Diagnosis Date    Arthritis     Back pain     Coronary artery disease involving native coronary artery of native heart without angina pectoris s/p RCA PCI in 6/2022 08/06/2022    Degenerative disc disease, lumbar     Mixed hyperlipidemia 08/06/2022    NSTEMI (non-ST elevated myocardial infarction) 06/29/2022     Social History     Socioeconomic History    Marital status:    Tobacco Use    Smoking status: Never    Smokeless tobacco: Former     Types: Snuff   Vaping Use    Vaping status: Never Used   Substance and  Sexual Activity    Alcohol use: Yes     Comment: occasionally    Drug use: No    Sexual activity: Defer     Partners: Female     Birth control/protection: None       Allergies  Patient has no known allergies.    Current Medication List    Current Outpatient Medications:     aspirin 81 MG EC tablet, Take 1 tablet by mouth Daily., Disp: 90 tablet, Rfl: 0    atorvastatin (LIPITOR) 80 MG tablet, Take 1 tablet by mouth Every Night., Disp: , Rfl:     cyclobenzaprine (FLEXERIL) 10 MG tablet, Take 1 tablet by mouth 2 (Two) Times a Day As Needed for Muscle Spasms., Disp: 60 tablet, Rfl: 0    HYDROcodone-acetaminophen (NORCO)  MG per tablet, Take 1 tablet by mouth Every Night., Disp: , Rfl:     metoprolol tartrate (LOPRESSOR) 25 MG tablet, TAKE ONE TABLET BY MOUTH EVERY 12 HOURS, Disp: 180 tablet, Rfl: 1    nitroglycerin (NITROSTAT) 0.4 MG SL tablet, Place 1 tablet under the tongue Every 5 (Five) Minutes As Needed for Chest Pain (Only if SBP Greater Than 100). Take no more than 3 doses in 15 minutes., Disp: 30 tablet, Rfl: 12    Semaglutide-Weight Management (Wegovy) 1 MG/0.5ML solution auto-injector, Inject 0.5 mL under the skin into the appropriate area as directed 1 (One) Time Per Week., Disp: 2 mL, Rfl: 0  Medications that contribute to weight gain: None  Drug Interactions  Wegovy+ ASA: Increases risk for hypoglycemia  Weovy + Lopressor: Increases risk for hypoglycemia    Relevant Laboratory Values  Lab Results   Component Value Date    CHOL 81 09/21/2023    TRIG 87 09/21/2023    HDL 52 09/21/2023    LDL 12 09/21/2023     Lab Results   Component Value Date    GLUCOSE 109 (H) 09/22/2023    BUN 24 (H) 09/22/2023    CREATININE 0.98 09/22/2023    EGFRIFNONA 77 08/21/2020    BCR 24.5 09/22/2023    K 4.2 09/22/2023    CO2 26.7 09/22/2023    CALCIUM 9.1 09/22/2023    ALBUMIN 4.2 09/21/2023    AST 22 09/21/2023    ALT 18 09/21/2023     There is no height or weight on file to calculate BMI.  Lab Results   Component Value  "Date    TSH 0.791 09/21/2023       Vaccinations:   Patient recommended to keep up with routine vaccinations.     Goals of Therapy  Clinical Goals or Therapeutic Targets: Prevent weight associated co-morbidities and complications      Date 8/2/24   8/30/24 9/27/24 10/24/24    Weight (lb) 269.4 lb 261 lb 260.2 lb 255 lb    BMI kg/ 35.49 34.43 34.33 33.64    Waist Circumference (in)  52.75\" 52\" 51.75\" 51\"        Medication Assessment & Plan    Patient has current BMI of 33.64, which is considered Class I Obesity.  Patient has lost 14.4 lbs since starting therapy. Congratulated patient on his success thus far!    Will order Wegovy 1.7 mg SC weekly.      See initial progress note regarding Keishakennedi Jerniganer's okay for patient to be on medication without being able to complete full screening information.     The following medications will need dose adjustments/closer monitoring once the GLP1 is started: NA    Discussed potential for increase risk of hypoglycemia with mediations. Patient aware to monitor for s/sx and rule of 15 discussed. Patient verbally acknowledged understanding.     Patient reports that he may have upcoming hip surgery and/or injections for hip issues. Patient advised to make sure all parties are aware that he is on Wegovy so that if it is needing to be held for any reason that they can advise him on this. Patient reported that he has told them he is taking the medication.     Discussed lifestyle modifications, including diet and exercise.  Patient education provided.     Worked with patient to create SMART Goal(s):   Maintain water intake of at least 64 oz per day  Eliminate late night eating 3 nights a week (currently reports 7 nights of late eating)      Discussed with patient:     Aim for protein in take of 1.5g/kg/day    Strength Training: Target all major muscle groups, 8-12 repetitions for each exercise and at least 2 x a week on non-consecutive days. With a total weekly target of 60 minutes/week. "     Patient was counseled that they will need to discuss holding medication 2-4 weeks prior to colonoscopies or surgical procedures with their surgery physician due to risk of aspiration and/or incomplete bowel prep.      Advised patient to get labs before next visit: Placed order for Thyroid panel and Hgb A1C (Katharine Dial already has CMP and lipid panel ordered). Patient may wait closer to cardiology appointment before getting labs done so that he doesn't have to do them twice since insurance may not cover.    Will follow-up with patient in clinic in 4 weeks.     Violeta Forde, PharmD  10/24/2024  14:05 EDT

## 2024-11-20 ENCOUNTER — DISEASE STATE MANAGEMENT VISIT (OUTPATIENT)
Dept: PHARMACY | Facility: HOSPITAL | Age: 64
End: 2024-11-20
Payer: MEDICARE

## 2024-11-20 VITALS
DIASTOLIC BLOOD PRESSURE: 80 MMHG | HEIGHT: 73 IN | HEART RATE: 98 BPM | BODY MASS INDEX: 33.69 KG/M2 | WEIGHT: 254.2 LBS | SYSTOLIC BLOOD PRESSURE: 141 MMHG

## 2024-11-20 RX ORDER — SEMAGLUTIDE 2.4 MG/.75ML
2.4 INJECTION, SOLUTION SUBCUTANEOUS WEEKLY
Qty: 3 ML | Refills: 0 | Status: SHIPPED | OUTPATIENT
Start: 2024-11-20

## 2024-11-20 NOTE — PROGRESS NOTES
[facility]  Weight Management Program      Delmar Denise Sr. is a 64 y.o. male referred to the [facility] by Keisha Dial for clinical pharmacy and specialty pharmacy management of GLP1 for weight management/ASCVD.  The patient denies a personal history of thyroid cancer and denies a personal history of pancreatitis.   Unfortunately, patient is unaware of family history as he reports he was adopted. Screening for GLP-1 use pertinent to family history cannot be completed.     Delmar Denise Sr. has previously tried calorie counting and lifestyle changes for weight loss. Current weight loss efforts include calorie coutning.     Patient is currently on Wegovy 1.7 mg weekly. Patient denies any lumps/swelling/hoarseness in neck/throat or abdominal pain. Patient reports tolerating medication well without any side effects. Patient denies any trouble giving injection or any missed doses. . Patient reports that he has not been doing well with exercise because of his hip pain. Patient does report that he is drinking more water then what he had been on last visit. He also reports that he has basically eliminated late night eating.     Patient will be having hip surgery soon but it is not scheduled yet. He is aware that he will need to let them know that he is on Wegovy and will likely need to hold prior to procedure. See below in plan for updated information regarding this.     Patient wishes to titrate dose at this time. Patient denies any changes to medications or allergies.       Relevant Past Medical History and Co-morbidities  Past Medical History:   Diagnosis Date    Arthritis     Back pain     Coronary artery disease involving native coronary artery of native heart without angina pectoris s/p RCA PCI in 6/2022 08/06/2022    Degenerative disc disease, lumbar     Mixed hyperlipidemia 08/06/2022    NSTEMI (non-ST elevated myocardial infarction) 06/29/2022     Social History      Socioeconomic History    Marital status:    Tobacco Use    Smoking status: Never    Smokeless tobacco: Former     Types: Snuff   Vaping Use    Vaping status: Never Used   Substance and Sexual Activity    Alcohol use: Yes     Comment: occasionally    Drug use: No    Sexual activity: Defer     Partners: Female     Birth control/protection: None       Allergies  Patient has no known allergies.    Current Medication List    Current Outpatient Medications:     aspirin 81 MG EC tablet, Take 1 tablet by mouth Daily., Disp: 90 tablet, Rfl: 0    atorvastatin (LIPITOR) 80 MG tablet, Take 1 tablet by mouth Every Night., Disp: , Rfl:     cyclobenzaprine (FLEXERIL) 10 MG tablet, Take 1 tablet by mouth 2 (Two) Times a Day As Needed for Muscle Spasms., Disp: 60 tablet, Rfl: 0    HYDROcodone-acetaminophen (NORCO)  MG per tablet, Take 1 tablet by mouth Every Night., Disp: , Rfl:     metoprolol tartrate (LOPRESSOR) 25 MG tablet, TAKE ONE TABLET BY MOUTH EVERY 12 HOURS, Disp: 180 tablet, Rfl: 1    nitroglycerin (NITROSTAT) 0.4 MG SL tablet, Place 1 tablet under the tongue Every 5 (Five) Minutes As Needed for Chest Pain (Only if SBP Greater Than 100). Take no more than 3 doses in 15 minutes., Disp: 30 tablet, Rfl: 12    Semaglutide-Weight Management (Wegovy) 1.7 MG/0.75ML solution auto-injector, Inject 0.75 mL under the skin into the appropriate area as directed 1 (One) Time Per Week., Disp: 3 mL, Rfl: 0  Medications that contribute to weight gain: None  Drug Interactions  Wegovy+ ASA: Increases risk for hypoglycemia  Weovy + Lopressor: Increases risk for hypoglycemia    Relevant Laboratory Values  Lab Results   Component Value Date    CHOL 81 09/21/2023    TRIG 87 09/21/2023    HDL 52 09/21/2023    LDL 12 09/21/2023     Lab Results   Component Value Date    GLUCOSE 109 (H) 09/22/2023    BUN 24 (H) 09/22/2023    CREATININE 0.98 09/22/2023    EGFRIFNONA 77 08/21/2020    BCR 24.5 09/22/2023    K 4.2 09/22/2023    CO2  "26.7 09/22/2023    CALCIUM 9.1 09/22/2023    ALBUMIN 4.2 09/21/2023    AST 22 09/21/2023    ALT 18 09/21/2023     There is no height or weight on file to calculate BMI.  Lab Results   Component Value Date    TSH 0.791 09/21/2023       Vaccinations:   Patient recommended to keep up with routine vaccinations.     Goals of Therapy  Clinical Goals or Therapeutic Targets: Prevent weight associated co-morbidities and complications      Date 8/2/24   8/30/24 9/27/24 10/24/24 11/20/24   Weight (lb) 269.4 lb 261 lb 260.2 lb 255 lb 254.2 lb   BMI kg/ 35.49 34.43 34.33 33.64 33.54   Waist Circumference (in)  52.75\" 52\" 51.75\" 51\" 51.5\"       Medication Assessment & Plan    Patient has current BMI of 33.54, which is considered Class I Obesity.  Patient has lost 15.2 lbs since starting therapy. Congratulated patient on his success thus far!    Will order Wegovy 2.4 mg SC weekly.      See initial progress note regarding Keisha Dial's okay for patient to be on medication without being able to complete full screening information.     The following medications will need dose adjustments/closer monitoring once the GLP1 is started: NA    Discussed potential for increase risk of hypoglycemia with mediations. Patient aware to monitor for s/sx and rule of 15 discussed. Patient verbally acknowledged understanding.     Patient reports that he may have upcoming hip surgery and/or injections for hip issues. Patient advised to make sure all parties are aware that he is on Wegovy so that if it is needing to be held for any reason that they can advise him on this. Patient reported that he has told them he is taking the medication.  Patient updated the clinic today that he will be evaluated tomorrow to see how many stents he may need in the area and he wouldn't be having any sort of surgery until at least 2 weeks after tomorrow.  Again re-iterated the importance of patient to discuss tomorrow at his appointment about any need to hold Wegovy prior " to any procedure being scheduled.     Discussed lifestyle modifications, including diet and exercise.  Patient education provided.     Worked with patient to create SMART Goal(s):   Maintain water intake of at least 64 oz per day  Eliminate late night eating 3 nights a week (currently reports 7 nights of late eating)      Discussed with patient:     Aim for protein in take of 1.5g/kg/day    Strength Training: Target all major muscle groups, 8-12 repetitions for each exercise and at least 2 x a week on non-consecutive days. With a total weekly target of 60 minutes/week.     Patient was counseled that they will need to discuss holding medication 2-4 weeks prior to colonoscopies or surgical procedures with their surgery physician due to risk of aspiration and/or incomplete bowel prep.      Patient has previously been advised of completing pertinent lab work.  Patient reports he will be completing these in December so that it can suffice for both this clinic and before his cardiology appointment.     Will follow-up with patient in clinic in 4 weeks.     Narcisa Pettit. Clarence, PharmD  11/20/2024  09:05 EST

## 2024-12-06 ENCOUNTER — TELEPHONE (OUTPATIENT)
Dept: CARDIOLOGY | Facility: CLINIC | Age: 64
End: 2024-12-06
Payer: MEDICARE

## 2024-12-06 NOTE — TELEPHONE ENCOUNTER
REQUEST FOR CARDIAC CLEARANCE    Caller name: JENIFER MERRILL    Phone Number: [phone]    Surgeon's name: DR. DEPALMA [facility] [place]    Type of planned surgery: HIP SURGERY    Date of planned surgery: 12.31.24    Type of anesthesia: UNKNOWN    Have you been experiencing chest pain or shortness of breath? NO    Is your doctor requesting for you to stop any of your medications prior to your surgery? NO    Where should we fax the clearance to? PT DOES NOT HAVE FAX NUMBER NOW

## 2024-12-06 NOTE — TELEPHONE ENCOUNTER
Patient had called in requesting cardiac clearance for hip surgery;. Called patient and have him scheduled for visit on 12/12/24 at 10 am.

## 2024-12-09 ENCOUNTER — LAB (OUTPATIENT)
Dept: LAB | Facility: HOSPITAL | Age: 64
End: 2024-12-09
Payer: MEDICARE

## 2024-12-09 DIAGNOSIS — I25.10 CORONARY ARTERY DISEASE INVOLVING NATIVE CORONARY ARTERY OF NATIVE HEART WITHOUT ANGINA PECTORIS: ICD-10-CM

## 2024-12-09 DIAGNOSIS — E78.2 MIXED HYPERLIPIDEMIA: Chronic | ICD-10-CM

## 2024-12-09 LAB
ALBUMIN SERPL-MCNC: 4 G/DL (ref 3.5–5.2)
ALBUMIN/GLOB SERPL: 1.3 G/DL
ALP SERPL-CCNC: 88 U/L (ref 39–117)
ALT SERPL W P-5'-P-CCNC: 14 U/L (ref 1–41)
ANION GAP SERPL CALCULATED.3IONS-SCNC: 9 MMOL/L (ref 5–15)
AST SERPL-CCNC: 19 U/L (ref 1–40)
BILIRUB SERPL-MCNC: 0.7 MG/DL (ref 0–1.2)
BUN SERPL-MCNC: 20 MG/DL (ref 8–23)
BUN/CREAT SERPL: 18 (ref 7–25)
CALCIUM SPEC-SCNC: 9.2 MG/DL (ref 8.6–10.5)
CHLORIDE SERPL-SCNC: 105 MMOL/L (ref 98–107)
CHOLEST SERPL-MCNC: 85 MG/DL (ref 0–200)
CO2 SERPL-SCNC: 26 MMOL/L (ref 22–29)
CREAT SERPL-MCNC: 1.11 MG/DL (ref 0.76–1.27)
DEPRECATED RDW RBC AUTO: 38.3 FL (ref 37–54)
EGFRCR SERPLBLD CKD-EPI 2021: 73.7 ML/MIN/1.73
ERYTHROCYTE [DISTWIDTH] IN BLOOD BY AUTOMATED COUNT: 12.6 % (ref 12.3–15.4)
GLOBULIN UR ELPH-MCNC: 3.2 GM/DL
GLUCOSE SERPL-MCNC: 90 MG/DL (ref 65–99)
HBA1C MFR BLD: 5.6 % (ref 4.8–5.6)
HCT VFR BLD AUTO: 42.4 % (ref 37.5–51)
HDLC SERPL-MCNC: 51 MG/DL (ref 40–60)
HGB BLD-MCNC: 14.2 G/DL (ref 13–17.7)
LDLC SERPL CALC-MCNC: 21 MG/DL (ref 0–100)
LDLC/HDLC SERPL: 0.47 {RATIO}
MCH RBC QN AUTO: 28.5 PG (ref 26.6–33)
MCHC RBC AUTO-ENTMCNC: 33.5 G/DL (ref 31.5–35.7)
MCV RBC AUTO: 85.1 FL (ref 79–97)
PLATELET # BLD AUTO: 238 10*3/MM3 (ref 140–450)
PMV BLD AUTO: 9.9 FL (ref 6–12)
POTASSIUM SERPL-SCNC: 4.2 MMOL/L (ref 3.5–5.2)
PROT SERPL-MCNC: 7.2 G/DL (ref 6–8.5)
RBC # BLD AUTO: 4.98 10*6/MM3 (ref 4.14–5.8)
SODIUM SERPL-SCNC: 140 MMOL/L (ref 136–145)
T-UPTAKE NFR SERPL: 1.06 TBI (ref 0.8–1.3)
T4 SERPL-MCNC: 6.74 MCG/DL (ref 4.5–11.7)
TRIGL SERPL-MCNC: 50 MG/DL (ref 0–150)
TSH SERPL DL<=0.05 MIU/L-ACNC: 1.78 UIU/ML (ref 0.27–4.2)
VLDLC SERPL-MCNC: 13 MG/DL (ref 5–40)
WBC NRBC COR # BLD AUTO: 6.1 10*3/MM3 (ref 3.4–10.8)

## 2024-12-09 PROCEDURE — 80061 LIPID PANEL: CPT

## 2024-12-09 PROCEDURE — 85027 COMPLETE CBC AUTOMATED: CPT

## 2024-12-09 PROCEDURE — 36415 COLL VENOUS BLD VENIPUNCTURE: CPT

## 2024-12-09 PROCEDURE — 80053 COMPREHEN METABOLIC PANEL: CPT

## 2024-12-12 ENCOUNTER — OFFICE VISIT (OUTPATIENT)
Dept: CARDIOLOGY | Facility: CLINIC | Age: 64
End: 2024-12-12
Payer: MEDICARE

## 2024-12-12 VITALS
HEART RATE: 76 BPM | HEIGHT: 73 IN | WEIGHT: 263 LBS | DIASTOLIC BLOOD PRESSURE: 86 MMHG | OXYGEN SATURATION: 97 % | SYSTOLIC BLOOD PRESSURE: 127 MMHG | BODY MASS INDEX: 34.85 KG/M2

## 2024-12-12 DIAGNOSIS — E78.2 MIXED HYPERLIPIDEMIA: Chronic | ICD-10-CM

## 2024-12-12 DIAGNOSIS — Z01.818 PREOPERATIVE CLEARANCE: ICD-10-CM

## 2024-12-12 DIAGNOSIS — I10 PRIMARY HYPERTENSION: Chronic | ICD-10-CM

## 2024-12-12 DIAGNOSIS — I25.10 CORONARY ARTERY DISEASE INVOLVING NATIVE CORONARY ARTERY OF NATIVE HEART WITHOUT ANGINA PECTORIS: Primary | Chronic | ICD-10-CM

## 2024-12-12 NOTE — ASSESSMENT & PLAN NOTE
Coronary Artery Disease: Coronary artery disease is stable.  Goals of Care:Medication tolerance and compliance and control progression of disease  Plan: Continue current treatment regimen.  Cardiac status will be reassessed in 6 months.

## 2024-12-12 NOTE — ASSESSMENT & PLAN NOTE
Dyslipidemia is Controlled  Goals of care: Medication compliance and tolerance, Control progression of disease, and Maintain LDL <70  Plan: Continue current medications and may consider down titration of statin in 6 months  Follow up: in 6 months

## 2024-12-12 NOTE — PROGRESS NOTES
"Chief Complaint  Follow-up (Denies chest pain, soa or swelling today, needs cardiac clearance )    Subjective          Delmar Coffman Camelia Chen presents to [facility] for follow up.    History of Present Illness        History of Present Illness  The patient is a 64-year-old male who presents for preoperative clearance for sacroiliac joint hardware fixation.  We follow him for coronary artery disease, hypertension, and hyperlipidemia.  His last visit to clinic was 07/25/2024 with me.  At that time no changes were made to his medication regimen.    He reports no chest pain or shortness of breath. He also reports no new or concerning symptoms. He has no history of heart failure, stroke, or mini-stroke. He is not on insulin therapy. He underwent blood work on Monday. His current medication regimen includes aspirin, metoprolol, atorvastatin, and Wegovy. He administers Wegovy on Saturdays and experienced constipation when he first started taking it. He has been advised to discontinue Wegovy 1 week prior to surgery.    His blood pressure is well-controlled on current medications.  His lipid panel also reveals excellent control.  He is tolerating his medications without complication       Tobacco Use: Medium Risk (12/12/2024)    Patient History     Smoking Tobacco Use: Never     Smokeless Tobacco Use: Former     Passive Exposure: Not on file       Objective     Vital Signs:   /86 (BP Location: Left arm, Patient Position: Sitting, Cuff Size: Large Adult)   Pulse 76   Ht 185.4 cm (73\")   Wt 119 kg (263 lb)   SpO2 97%   BMI 34.70 kg/m²       Physical Exam  Vitals and nursing note reviewed. Exam conducted with a chaperone present (Wife accompanies).   Constitutional:       General: He is not in acute distress.     Appearance: He is obese.   HENT:      Head: Normocephalic and atraumatic.   Eyes:      Conjunctiva/sclera: Conjunctivae normal.   Neck:      Vascular: No carotid bruit. "   Cardiovascular:      Rate and Rhythm: Normal rate and regular rhythm.      Pulses: Normal pulses.   Pulmonary:      Effort: Pulmonary effort is normal.      Breath sounds: Normal breath sounds.   Musculoskeletal:      Cervical back: Neck supple.      Right lower leg: No edema.      Left lower leg: No edema.   Skin:     General: Skin is warm and dry.   Neurological:      General: No focal deficit present.      Mental Status: He is alert.   Psychiatric:         Mood and Affect: Mood normal.         Behavior: Behavior normal.             Result Review :   The following data was reviewed by: MATTHEW Amezcua on 12/12/2024:  Common labs          12/9/2024    08:07   Common Labs   Glucose 90    BUN 20    Creatinine 1.11    Sodium 140    Potassium 4.2    Chloride 105    Calcium 9.2    Albumin 4.0    Total Bilirubin 0.7    Alkaline Phosphatase 88    AST (SGOT) 19    ALT (SGPT) 14    WBC 6.10    Hemoglobin 14.2    Hematocrit 42.4    Platelets 238    Total Cholesterol 85    Triglycerides 50    HDL Cholesterol 51    LDL Cholesterol  21    Hemoglobin A1C 5.60      Lipid Panel          12/9/2024    08:07   Lipid Panel   Total Cholesterol 85    Triglycerides 50    HDL Cholesterol 51    VLDL Cholesterol 13    LDL Cholesterol  21    LDL/HDL Ratio 0.47      Data reviewed : Cardiology studies as detailed below      Last Cardiac Cath  Results for orders placed during the hospital encounter of 06/29/22    Cardiac Catheterization/Vascular Study    Narrative  Images from the original result were not included.  CARDIAC CATHETERIZATION / INTERVENTION REPORT            DATE OF PROCEDURE: 6/29/2022      INDICATION FOR PROCEDURE: NSTEMI      PRE PROCEDURE DIAGNOSIS:  NSTEMI  HTN    POST PROCEDURE DIAGNOSIS:  CAD proximal % occlusion s/p PCI with 3.5 x 18 MATTHEW post dilated with 4.5 NC balloon at high pressure    Face to face mdoerate conscious  sedation time :      COMPLICATIONS : None    Specimens collected : None    PROCEDURE  PERFORMED:    1. Selective right and left Coronary Angiogram  2. Left heart catheretization  3. Left Ventriculography  4. PCI of RCA  5. IVUS of RCA  6.  Manual aspiration thrombectomy of RCA    Description of the procedure:  Prior to the procedure risk, benefits and possible alternative were discussed with the patient and informed consent was obtained. Patient was brought to cardiac cath lab table in post absorbtive state. Patient was prepped and drape in usual sterile fashion. IV Versed and Fentanyl was used for moderate sedation. 2% Lidocaine was used for topical anesthesia. R radial arterial site was prepped and a micropuncture needle was used to access the artery and a 5 F slender sheath was placed. 2.5 mg of Verapamil and 200 mcg of NTG was given through the sheath intra arterial and 5000 units of Heparin was given once the catheter crossed the aortic arch.    5 F TIG 4 catheters was used for right and left coronary angiogram and 5 F pigtail catheter was used for Left heart hemodynamics and left ventriculography. All the catheters were exchanged over 0.035 wire. The R radial arterial sheath was removed and TR band was applied and immediate and complete hemostasis was achieved. The patient tolerate the entire procedure well without any immediate known complications.    Coronary anatomy findings:    LM: Is a large calibre vessel , normal take off from left cusp, divides into LAD and Lcx. No significant stenosis noted    LAD: Large calibre vessel, mid after origin of D1 had mild 40% stenosis, no other significant stenosis noted, D1 patent    LCX: Moderate calibre vessel, mild luminal irregularities. Large hign OM1 artery with no stenosis, small OM2 artery which had about 40-50% stenosis in proximal portion.    RCA: Large calibre, dominant artery, normal take off from right cusp.  100% occluded in proximal after take of conus branch s/p PCI with LILIAN 3 flow, mid had mild 30-40% stenosis, RPDA and PL branches had  no stenosis. .    Left Ventriculography:    LV systolic function was mildly reduced  with visual estimated EF of 45-50% with inferior wall hypokinesia.  No significant mitral regurgitation noted.    LVEDP: 21 mmHg  No gradient across the aortic valve on pull back.      PERCUTANEOUS CORONARY INTERVENTION PROCEDURE NOTE:    The patient did not have any ST elevations on presentation, he already had collaterals supplying the distal RPDA and PL, I did asked the patient regarding symptom onset and he was very confident that his first ever chest pain was last night which is less than 24 hours so I decided to try to wire and do angioplasty to see if occlusion is more of an acute rather than subacute.    Heparin monotherapy was used for anticoagulation.  Total of 10,000 Units was given IV. Pt also received single bolus integrelin    6 F JR 4 guide was used to engage the RCA coaxially.    0.014 Runthrough guidewire was used to cross the lesion and parked distally.  There was no challenges in wiring the artery likely an acute occlusion.    There was significant clot burden even after wiring of the RCA so used [employer] manual aspiration catheter and significant red thrombi was extracted which actually did establish a good LILIAN-3 flow and 90% stenosis at the plaque rupture site.    Used a 3.0 x 15 compliant TREK balloon to predilate the lesion at 20 linda.    Prepped a 3.5 x 18 [employer] eluting stent and position at the lesion and successfully deployed at 16 linda.  The stent did appear to be undersized for the more distal portion of the stent and the distal reference artery diameter was at least 5 mm from IVUS, I then used a 4.5 NC balloon to postdilated 20 linda.    Post stent deployment angio pictures showed good expansion with 0% residual stenosis, LILIAN 3 flow in the distal vascular bed and no dissection or distal wire perforation.    Lesion length: 15 mm  Pre PCI Stenosis: 100 %  Post PCI stenosis: 0 %  Pre PCI LILIAN flow:  0  Post PCI LILIAN flow: 3    EBL: Less than 10cc              Recommendations:  Aggressive guideline directed medical management for CAD  Dual Anti platelet medication with Asa 81 mg qd and Brilinta 90 mg bid for minimum 1 year.            Donovan Swartz MD, [facility]  Interventional Cardiology    06/29/22  12:39 EDT      Last Stress test  Results for orders placed during the hospital encounter of 09/21/23    Stress Test With Myocardial Perfusion One Day    Interpretation Summary    Myocardial perfusion imaging indicates a small-sized infarct located in the inferior wall with no significant ischemia noted.    Left ventricular ejection fraction is normal (Calculated EF = 60%).    Abnormal LV wall motion consistent with mild hypokineses of the inferior wall.    Impressions are consistent with an intermediate risk study.    Diaphragmatic attenuation artifact is present.    Findings consistent with a normal ECG stress test.       Last Echo  Results for orders placed during the hospital encounter of 09/21/23    Adult Transthoracic Echo Complete w/ Color, Spectral and Contrast if necessary per protocol    Interpretation Summary    Left ventricular systolic function is normal. Left ventricular ejection fraction appears to be 51 - 55%.    Left ventricular diastolic function is consistent with (grade I) impaired relaxation.    There is no evidence of pericardial effusion         ECG 12 Lead    Date/Time: 12/12/2024 10:23 AM  Performed by: Keisha Dial APRN    Authorized by: Keisha Dial APRN  Comparison: compared with previous ECG from 7/25/2024  Comparison to previous ECG: Sinus rhythm with right bundle branch block  Rhythm: sinus rhythm  Rate: normal  BPM: 77  Q waves: aVF    QRS axis: left  Other findings: poor R wave progression    Clinical impression: non-specific ECG           Current Outpatient Medications   Medication Sig Dispense Refill    aspirin 81 MG EC tablet Take 1 tablet by mouth Daily. 90 tablet 0     atorvastatin (LIPITOR) 80 MG tablet Take 1 tablet by mouth Every Night.      cyclobenzaprine (FLEXERIL) 10 MG tablet Take 1 tablet by mouth 2 (Two) Times a Day As Needed for Muscle Spasms. 60 tablet 0    HYDROcodone-acetaminophen (NORCO)  MG per tablet Take 1 tablet by mouth Every Night.      metoprolol tartrate (LOPRESSOR) 25 MG tablet TAKE ONE TABLET BY MOUTH EVERY 12 HOURS 180 tablet 1    nitroglycerin (NITROSTAT) 0.4 MG SL tablet Place 1 tablet under the tongue Every 5 (Five) Minutes As Needed for Chest Pain (Only if SBP Greater Than 100). Take no more than 3 doses in 15 minutes. 30 tablet 12    Semaglutide-Weight Management (Wegovy) 2.4 MG/0.75ML solution auto-injector Inject 2.4 mg under the skin into the appropriate area as directed 1 (One) Time Per Week. 3 mL 0     No current facility-administered medications for this visit.            Assessment and Plan    Problem List Items Addressed This Visit       Mixed hyperlipidemia (Chronic)    Overview     Goals of care-LDL less than 70  09/21/2023-total cholesterol 81, triglycerides 87, HDL 52, LDL 12  12/09/2024-total cholesterol 85, triglycerides 50, HDL 51, LDL 21         Current Assessment & Plan      Dyslipidemia is Controlled  Goals of care: Medication compliance and tolerance, Control progression of disease, and Maintain LDL <70  Plan: Continue current medications and may consider down titration of statin in 6 months  Follow up: in 6 months               Primary hypertension (Chronic)    Current Assessment & Plan     Hypertension is Controlled  Goals of Care:Medication compliance and tolerance, Systolic blood pressure <130, and Diastolic blood pressure  <80  Plan:  Continue current medications  Follow up:in 6 months           Coronary artery disease involving native coronary artery of native heart without angina pectoris s/p RCA PCI in 6/2022  - Primary (Chronic)    Current Assessment & Plan     Coronary Artery Disease: Coronary artery disease is  stable.  Goals of Care:Medication tolerance and compliance and control progression of disease  Plan: Continue current treatment regimen.  Cardiac status will be reassessed in 6 months.           Relevant Orders    ECG 12 Lead     Other Visit Diagnoses       Preoperative clearance        Relevant Orders    ECG 12 Lead          Diagnoses and all orders for this visit:    1. Coronary artery disease involving native coronary artery of native heart without angina pectoris s/p RCA PCI in 6/2022  (Primary)  Assessment & Plan:  Coronary Artery Disease: Coronary artery disease is stable.  Goals of Care:Medication tolerance and compliance and control progression of disease  Plan: Continue current treatment regimen.  Cardiac status will be reassessed in 6 months.      Orders:  -     ECG 12 Lead    2. Mixed hyperlipidemia  Assessment & Plan:   Dyslipidemia is Controlled  Goals of care: Medication compliance and tolerance, Control progression of disease, and Maintain LDL <70  Plan: Continue current medications and may consider down titration of statin in 6 months  Follow up: in 6 months            3. Primary hypertension  Assessment & Plan:  Hypertension is Controlled  Goals of Care:Medication compliance and tolerance, Systolic blood pressure <130, and Diastolic blood pressure  <80  Plan:  Continue current medications  Follow up:in 6 months        4. Preoperative clearance  -     ECG 12 Lead      Assessment & Plan  1. Preoperative clearance for sacroiliac joint hardware fixation.  His Revised Cardiac Index indicates a 6% risk of a major cardiac event, which is considered low. The surgical procedure is classified as moderate risk due to his age, cardiac disease, hypertension, and obesity. However, these factors do not contraindicate the surgery. His EKG shows no significant changes compared to the previous one. Kidney function, liver function, A1c, thyroid level, and cholesterol are all within normal limits. LDL cholesterol is  21. He will discontinue Wegovy on 12/28/2024, with the last dose administered on 12/21/2024, and can resume postoperatively. Aspirin will be withheld for a week prior to surgery, with the last dose taken on 12/24/2024. Atorvastatin dosage will remain unchanged at this time. If his LDL cholesterol level remains in the 20s upon reevaluation in 6 months, a reduction in the atorvastatin dosage may be considered.         Follow Up     Return in about 6 months (around 6/12/2025).    Patient was given instructions and counseling regarding his condition or for health maintenance advice. Please see specific information pulled into the AVS if appropriate.       Electronically signed by MATTHEW Amezcua, 12/12/24, 10:25 AM EST.    Patient or patient representative verbalized consent for the use of Ambient Listening during the visit with  MATTHEW Amezcua for chart documentation. 12/12/2024  10:25 EST     Dictated Utilizing Dragon Dictation: Part of this note may be an electronic transcription/translation of spoken language to printed text using the Dragon Dictation System

## 2024-12-12 NOTE — ASSESSMENT & PLAN NOTE
Hypertension is Controlled  Goals of Care:Medication compliance and tolerance, Systolic blood pressure <130, and Diastolic blood pressure  <80  Plan:  Continue current medications  Follow up:in 6 months

## 2024-12-19 ENCOUNTER — DISEASE STATE MANAGEMENT VISIT (OUTPATIENT)
Dept: PHARMACY | Facility: HOSPITAL | Age: 64
End: 2024-12-19
Payer: MEDICARE

## 2024-12-19 VITALS
DIASTOLIC BLOOD PRESSURE: 80 MMHG | HEART RATE: 81 BPM | SYSTOLIC BLOOD PRESSURE: 132 MMHG | HEIGHT: 73 IN | BODY MASS INDEX: 33.74 KG/M2 | WEIGHT: 254.6 LBS

## 2024-12-19 RX ORDER — SEMAGLUTIDE 2.4 MG/.75ML
2.4 INJECTION, SOLUTION SUBCUTANEOUS WEEKLY
Qty: 3 ML | Refills: 0 | Status: SHIPPED | OUTPATIENT
Start: 2024-12-19

## 2024-12-19 NOTE — PROGRESS NOTES
[facility]  Weight Management Program      Delmar Denise Sr. is a 64 y.o. male referred to the [facility] by Keisha Dial for clinical pharmacy and specialty pharmacy management of GLP1 for weight management/ASCVD.  The patient denies a personal history of thyroid cancer and denies a personal history of pancreatitis.   Unfortunately, patient is unaware of family history as he reports he was adopted. Screening for GLP-1 use pertinent to family history cannot be completed.     Delmar Denise Sr. has previously tried calorie counting and lifestyle changes for weight loss. Current weight loss efforts include calorie coutning.     Patient is currently on Wegovy 2.4 mg weekly. Patient denies any lumps/swelling/hoarseness in neck/throat or abdominal pain. Patient reports tolerating medication well without any side effects. Patient denies any trouble giving injection or any missed doses. . Patient reports that he has not been doing well with exercise because of his hip pain. Patient does report that he is drinking more water then what he had been on last visit, maybe half a gallon. He also reports that he has basically eliminated late night eating. He reports seeing quite a bit of appetite suppression.     Patient will be having hip surgery soon and it is scheduled for December 31st. He is aware that he will need to let them know that he is on Wegovy and will likely need to hold prior to procedure. See below in plan for updated information regarding this.     Patient denies any changes to medications or allergies.       Relevant Past Medical History and Co-morbidities  Past Medical History:   Diagnosis Date    Arthritis     Back pain     Coronary artery disease involving native coronary artery of native heart without angina pectoris s/p RCA PCI in 6/2022 08/06/2022    Degenerative disc disease, lumbar     Mixed hyperlipidemia 08/06/2022    NSTEMI (non-ST elevated myocardial  infarction) 06/29/2022     Social History     Socioeconomic History    Marital status:    Tobacco Use    Smoking status: Never    Smokeless tobacco: Former     Types: Snuff   Vaping Use    Vaping status: Never Used   Substance and Sexual Activity    Alcohol use: Yes     Comment: occasionally    Drug use: No    Sexual activity: Defer     Partners: Female     Birth control/protection: None       Allergies  Patient has no known allergies.    Current Medication List    Current Outpatient Medications:     aspirin 81 MG EC tablet, Take 1 tablet by mouth Daily., Disp: 90 tablet, Rfl: 0    atorvastatin (LIPITOR) 80 MG tablet, Take 1 tablet by mouth Every Night., Disp: , Rfl:     cyclobenzaprine (FLEXERIL) 10 MG tablet, Take 1 tablet by mouth 2 (Two) Times a Day As Needed for Muscle Spasms., Disp: 60 tablet, Rfl: 0    HYDROcodone-acetaminophen (NORCO)  MG per tablet, Take 1 tablet by mouth Every Night., Disp: , Rfl:     metoprolol tartrate (LOPRESSOR) 25 MG tablet, TAKE ONE TABLET BY MOUTH EVERY 12 HOURS, Disp: 180 tablet, Rfl: 1    nitroglycerin (NITROSTAT) 0.4 MG SL tablet, Place 1 tablet under the tongue Every 5 (Five) Minutes As Needed for Chest Pain (Only if SBP Greater Than 100). Take no more than 3 doses in 15 minutes., Disp: 30 tablet, Rfl: 12    Semaglutide-Weight Management (Wegovy) 2.4 MG/0.75ML solution auto-injector, Inject 2.4 mg under the skin into the appropriate area as directed 1 (One) Time Per Week., Disp: 3 mL, Rfl: 0  Medications that contribute to weight gain: None  Drug Interactions  Wegovy+ ASA: Increases risk for hypoglycemia  Weovy + Lopressor: Increases risk for hypoglycemia    Relevant Laboratory Values  Lab Results   Component Value Date    CHOL 85 12/09/2024    TRIG 50 12/09/2024    HDL 51 12/09/2024    LDL 21 12/09/2024     Lab Results   Component Value Date    GLUCOSE 90 12/09/2024    BUN 20 12/09/2024    CREATININE 1.11 12/09/2024    EGFRIFNONA 77 08/21/2020    BCR 18.0  "12/09/2024    K 4.2 12/09/2024    CO2 26.0 12/09/2024    CALCIUM 9.2 12/09/2024    ALBUMIN 4.0 12/09/2024    AST 19 12/09/2024    ALT 14 12/09/2024     Body mass index is 33.6 kg/m².  Lab Results   Component Value Date    TSH 1.780 12/09/2024       Vaccinations:   Patient recommended to keep up with routine vaccinations.     Goals of Therapy  Clinical Goals or Therapeutic Targets: Prevent weight associated co-morbidities and complications      Date 8/2/24   8/30/24 9/27/24 10/24/24 11/20/24 12/19/24   Weight (lb) 269.4 lb 261 lb 260.2 lb 255 lb 254.2 lb 254.6 lb   BMI kg/ 35.49 34.43 34.33 33.64 33.54 33.60   Waist Circumference (in)  52.75\" 52\" 51.75\" 51\" 51.5\" 49.5\"       Medication Assessment & Plan    Patient has current BMI of 33.60, which is considered Class I Obesity.  Patient has lost about 15 lbs since starting therapy. Congratulated patient on his success thus far!    Will re-order Wegovy 2.4 mg SC weekly.      See initial progress note regarding Keisha Dial's okay for patient to be on medication without being able to complete full screening information.     The following medications will need dose adjustments/closer monitoring once the GLP1 is started: NA    Discussed potential for increase risk of hypoglycemia with mediations. Patient aware to monitor for s/sx and rule of 15 discussed. Patient verbally acknowledged understanding.     Patient reports that he may have upcoming hip surgery and/or injections for hip issues. Patient advised to make sure all parties are aware that he is on Wegovy so that if it is needing to be held for any reason that they can advise him on this. Patient reported that he has told them he is taking the medication.  Patient updated the clinic today that he will be evaluated tomorrow to see how many stents he may need in the area and he wouldn't be having any sort of surgery until at least 2 weeks after tomorrow.  Again re-iterated the importance of patient to discuss tomorrow at " his appointment about any need to hold Wegovy prior to any procedure being scheduled. Patient reports his surgery is scheduled for December 31st and he has spoken to Katharine Dial about doses that need to be held.     Discussed lifestyle modifications, including diet and exercise.  Patient education provided.     Worked with patient to create SMART Goal(s):   Maintain water intake of at least 64 oz per day  Eliminate late night eating 3 nights a week (currently reports 7 nights of late eating)      Discussed with patient:     Aim for protein in take of 1.5g/kg/day    Strength Training: Target all major muscle groups, 8-12 repetitions for each exercise and at least 2 x a week on non-consecutive days. With a total weekly target of 60 minutes/week.     Patient was counseled that they will need to discuss holding medication 2-4 weeks prior to colonoscopies or surgical procedures with their surgery physician due to risk of aspiration and/or incomplete bowel prep.      Patient has previously been advised of completing pertinent lab work.  Patient reports he will be completing these in December so that it can suffice for both this clinic and before his cardiology appointment.     Will follow-up with patient in clinic in 4 weeks.     Noris Gonzalez RPH  12/19/2024  08:17 EST

## 2024-12-26 ENCOUNTER — OFFICE VISIT (OUTPATIENT)
Dept: FAMILY MEDICINE CLINIC | Facility: CLINIC | Age: 64
End: 2024-12-26
Payer: MEDICARE

## 2024-12-26 VITALS
BODY MASS INDEX: 33 KG/M2 | SYSTOLIC BLOOD PRESSURE: 124 MMHG | HEIGHT: 73 IN | TEMPERATURE: 98.2 F | OXYGEN SATURATION: 99 % | WEIGHT: 249 LBS | DIASTOLIC BLOOD PRESSURE: 78 MMHG | HEART RATE: 97 BPM

## 2024-12-26 DIAGNOSIS — J10.1 INFLUENZA A: ICD-10-CM

## 2024-12-26 DIAGNOSIS — R05.9 COUGH, UNSPECIFIED TYPE: Primary | ICD-10-CM

## 2024-12-26 LAB
EXPIRATION DATE: ABNORMAL
FLUAV AG UPPER RESP QL IA.RAPID: DETECTED
FLUBV AG UPPER RESP QL IA.RAPID: NOT DETECTED
INTERNAL CONTROL: ABNORMAL
Lab: ABNORMAL
SARS-COV-2 AG UPPER RESP QL IA.RAPID: NOT DETECTED

## 2024-12-26 PROCEDURE — 1126F AMNT PAIN NOTED NONE PRSNT: CPT | Performed by: FAMILY MEDICINE

## 2024-12-26 PROCEDURE — 3078F DIAST BP <80 MM HG: CPT | Performed by: FAMILY MEDICINE

## 2024-12-26 PROCEDURE — 3074F SYST BP LT 130 MM HG: CPT | Performed by: FAMILY MEDICINE

## 2024-12-26 PROCEDURE — 87428 SARSCOV & INF VIR A&B AG IA: CPT | Performed by: FAMILY MEDICINE

## 2024-12-26 PROCEDURE — 99213 OFFICE O/P EST LOW 20 MIN: CPT | Performed by: FAMILY MEDICINE

## 2024-12-26 PROCEDURE — G2211 COMPLEX E/M VISIT ADD ON: HCPCS | Performed by: FAMILY MEDICINE

## 2024-12-26 RX ORDER — OSELTAMIVIR PHOSPHATE 75 MG/1
75 CAPSULE ORAL 2 TIMES DAILY
Qty: 10 CAPSULE | Refills: 0 | Status: SHIPPED | OUTPATIENT
Start: 2024-12-26

## 2024-12-26 NOTE — PROGRESS NOTES
Subjective   Delmar Denise Sr. is a 64 y.o. male.   Pt presents today with CC of Cough      History of Present Illness   History of Present Illness  Patient is a 64-year-old male who reports that for the past 2.5 days he has cough, congestion, and generally feeling poorly.  His condition has been worsening.  He tested positive for influenza.       The following portions of the patient's history were reviewed and updated as appropriate: allergies, current medications, past family history, past medical history, past social history, past surgical history, and problem list.    Review of Systems   Constitutional:  Positive for fever. Negative for chills and unexpected weight loss.   HENT:  Positive for congestion, postnasal drip, rhinorrhea, sinus pressure and sore throat.    Eyes:  Negative for blurred vision and visual disturbance.   Respiratory:  Positive for cough. Negative for wheezing.    Cardiovascular:  Negative for chest pain and palpitations.   Gastrointestinal:  Negative for abdominal pain, diarrhea and nausea.   Genitourinary:  Negative for dysuria.   Musculoskeletal:  Negative for arthralgias and neck stiffness.   Skin:  Negative for rash.   Neurological:  Negative for seizures and syncope.     Vitals:    12/26/24 1130   BP: 124/78   Pulse: 97   Temp: 98.2 °F (36.8 °C)   SpO2: 99%        Objective   Physical Exam  Vitals and nursing note reviewed.   Constitutional:       Appearance: He is well-developed.   HENT:      Head: Normocephalic and atraumatic.      Right Ear: External ear normal.      Left Ear: External ear normal.      Nose: Nose normal.   Eyes:      Conjunctiva/sclera: Conjunctivae normal.      Pupils: Pupils are equal, round, and reactive to light.   Cardiovascular:      Rate and Rhythm: Normal rate and regular rhythm.      Heart sounds: Normal heart sounds.   Pulmonary:      Effort: Pulmonary effort is normal.      Breath sounds: Normal breath sounds.   Abdominal:      General: Bowel sounds are  normal.      Palpations: Abdomen is soft.   Musculoskeletal:      Cervical back: Normal range of motion and neck supple.   Skin:     General: Skin is warm and dry.   Neurological:      Mental Status: He is alert and oriented to person, place, and time.   Psychiatric:         Behavior: Behavior normal.           Assessment & Plan   Diagnoses and all orders for this visit:    1. Cough, unspecified type (Primary)  -     POCT SARS-CoV-2 Antigen FLOR + Flu    2. Influenza A  -     oseltamivir (Tamiflu) 75 MG capsule; Take 1 capsule by mouth 2 (Two) Times a Day.  Dispense: 10 capsule; Refill: 0      Tested positive for influenza A.  Based on age and risk factors, I would recommend starting Tamiflu.  He has been ill for about 2.5 days.  Of note, he is scheduled for procedure in a few weeks.                         This document has been electronically signed by Ruddy Vega DO  December 26, 2024 13:27 EST    Dictated Utilizing Dragon Dictation: Part of this note may be an electronic transcription/translation of spoken language to printed text using the Dragon Dictation System.

## 2025-01-14 ENCOUNTER — OFFICE VISIT (OUTPATIENT)
Dept: FAMILY MEDICINE CLINIC | Facility: CLINIC | Age: 65
End: 2025-01-14
Payer: MEDICARE

## 2025-01-14 ENCOUNTER — TELEPHONE (OUTPATIENT)
Dept: FAMILY MEDICINE CLINIC | Facility: CLINIC | Age: 65
End: 2025-01-14

## 2025-01-14 VITALS
TEMPERATURE: 97.5 F | HEART RATE: 92 BPM | DIASTOLIC BLOOD PRESSURE: 78 MMHG | OXYGEN SATURATION: 99 % | WEIGHT: 251.8 LBS | HEIGHT: 73 IN | SYSTOLIC BLOOD PRESSURE: 132 MMHG | BODY MASS INDEX: 33.37 KG/M2

## 2025-01-14 DIAGNOSIS — R05.1 ACUTE COUGH: Primary | ICD-10-CM

## 2025-01-14 PROCEDURE — 1126F AMNT PAIN NOTED NONE PRSNT: CPT | Performed by: FAMILY MEDICINE

## 2025-01-14 PROCEDURE — 3075F SYST BP GE 130 - 139MM HG: CPT | Performed by: FAMILY MEDICINE

## 2025-01-14 PROCEDURE — 3078F DIAST BP <80 MM HG: CPT | Performed by: FAMILY MEDICINE

## 2025-01-14 PROCEDURE — 1170F FXNL STATUS ASSESSED: CPT | Performed by: FAMILY MEDICINE

## 2025-01-14 PROCEDURE — 1160F RVW MEDS BY RX/DR IN RCRD: CPT | Performed by: FAMILY MEDICINE

## 2025-01-14 PROCEDURE — 1159F MED LIST DOCD IN RCRD: CPT | Performed by: FAMILY MEDICINE

## 2025-01-14 PROCEDURE — 99213 OFFICE O/P EST LOW 20 MIN: CPT | Performed by: FAMILY MEDICINE

## 2025-01-14 PROCEDURE — G0439 PPPS, SUBSEQ VISIT: HCPCS | Performed by: FAMILY MEDICINE

## 2025-01-14 RX ORDER — METHYLPREDNISOLONE 4 MG/1
TABLET ORAL
Qty: 21 TABLET | Refills: 0 | Status: SHIPPED | OUTPATIENT
Start: 2025-01-14

## 2025-01-14 NOTE — TELEPHONE ENCOUNTER
----- Message from Kylie Avendaño sent at 1/14/2025  1:22 PM EST -----  Please let patient know results are normal. Thank you.

## 2025-01-14 NOTE — PROGRESS NOTES
Subjective   The ABCs of the Annual Wellness Visit  Medicare Wellness Visit      Delmar Denise Sr. is a 64 y.o. patient who presents for a Medicare Wellness Visit.    The following portions of the patient's history were reviewed and   updated as appropriate: allergies, current medications, past family history, past medical history, past social history, past surgical history, and problem list.    Compared to one year ago, the patient's physical   health is the same.  Compared to one year ago, the patient's mental   health is the same.    Recent Hospitalizations:  He was not admitted to the hospital during the last year.     Current Medical Providers:  Patient Care Team:  Kylie Avendaño APRN as PCP - General (Nurse Practitioner)    Outpatient Medications Prior to Visit   Medication Sig Dispense Refill    aspirin 81 MG EC tablet Take 1 tablet by mouth Daily. 90 tablet 0    atorvastatin (LIPITOR) 80 MG tablet Take 1 tablet by mouth Every Night.      cyclobenzaprine (FLEXERIL) 10 MG tablet Take 1 tablet by mouth 2 (Two) Times a Day As Needed for Muscle Spasms. 60 tablet 0    HYDROcodone-acetaminophen (NORCO)  MG per tablet Take 1 tablet by mouth Every Night.      metoprolol tartrate (LOPRESSOR) 25 MG tablet TAKE ONE TABLET BY MOUTH EVERY 12 HOURS 180 tablet 1    nitroglycerin (NITROSTAT) 0.4 MG SL tablet Place 1 tablet under the tongue Every 5 (Five) Minutes As Needed for Chest Pain (Only if SBP Greater Than 100). Take no more than 3 doses in 15 minutes. 30 tablet 12    Semaglutide-Weight Management (Wegovy) 2.4 MG/0.75ML solution auto-injector Inject 2.4 mg under the skin into the appropriate area as directed 1 (One) Time Per Week. 3 mL 0    oseltamivir (Tamiflu) 75 MG capsule Take 1 capsule by mouth 2 (Two) Times a Day. 10 capsule 0     No facility-administered medications prior to visit.     Opioid medication/s are on active medication list.  and I have evaluated his active treatment plan and pain score  "trends (see table).  Vitals:    01/14/25 1010   PainSc: 0-No pain     I have reviewed the chart for potential of high risk medication and harmful drug interactions in the elderly.        Aspirin is on active medication list. Aspirin use is indicated based on review of current medical condition/s. Pros and cons of this therapy have been discussed today. Benefits of this medication outweigh potential harm.  Patient has been encouraged to continue taking this medication.  .      Patient Active Problem List   Diagnosis    Pre-op testing    Lipoma of head    Mixed hyperlipidemia    Primary hypertension    Coronary artery disease involving native coronary artery of native heart without angina pectoris s/p RCA PCI in 6/2022      Advance Care Planning Advance Directive is not on file.  ACP discussion was held with the patient during this visit. Patient does not have an advance directive, information provided.            Objective   Vitals:    01/14/25 1010   BP: 132/78   BP Location: Right arm   Patient Position: Sitting   Cuff Size: Large Adult   Pulse: 92   Temp: 97.5 °F (36.4 °C)   TempSrc: Temporal   SpO2: 99%   Weight: 114 kg (251 lb 12.8 oz)   Height: 185.4 cm (73\")   PainSc: 0-No pain       Estimated body mass index is 33.22 kg/m² as calculated from the following:    Height as of this encounter: 185.4 cm (73\").    Weight as of this encounter: 114 kg (251 lb 12.8 oz).                Does the patient have evidence of cognitive impairment? No  Lab Results   Component Value Date    TRIG 50 12/09/2024    HDL 51 12/09/2024    LDL 21 12/09/2024    VLDL 13 12/09/2024    HGBA1C 5.60 12/09/2024                                                                                                Health  Risk Assessment    Smoking Status:  Social History     Tobacco Use   Smoking Status Never   Smokeless Tobacco Former    Types: Snuff     Alcohol Consumption:  Social History     Substance and Sexual Activity   Alcohol Use Yes    " Comment: occasionally       Fall Risk Screen  YENIADI Fall Risk Assessment was completed, and patient is at LOW risk for falls.Assessment completed on:2025    Depression Screening   Little interest or pleasure in doing things? Not at all   Feeling down, depressed, or hopeless? Not at all   PHQ-2 Total Score 0      Health Habits and Functional and Cognitive Screenin/14/2025    10:17 AM   Functional & Cognitive Status   Do you have difficulty preparing food and eating? No   Do you have difficulty bathing yourself, getting dressed or grooming yourself? No   Do you have difficulty using the toilet? No   Do you have difficulty moving around from place to place? No   Do you have trouble with steps or getting out of a bed or a chair? Yes   Current Diet Well Balanced Diet   Dental Exam Up to date   Eye Exam Up to date   Exercise (times per week) 3 times per week   Current Exercises Include Walking   Do you need help using the phone?  No   Are you deaf or do you have serious difficulty hearing?  No   Do you need help to go to places out of walking distance? No   Do you need help shopping? No   Do you need help preparing meals?  No   Do you need help with housework?  No   Do you need help with laundry? No   Do you need help taking your medications? No   Do you need help managing money? No   Do you ever drive or ride in a car without wearing a seat belt? No   Have you felt unusual stress, anger or loneliness in the last month? No   Who do you live with? Spouse   If you need help, do you have trouble finding someone available to you? No   Have you been bothered in the last four weeks by sexual problems? No   Do you have difficulty concentrating, remembering or making decisions? No           Age-appropriate Screening Schedule:  Refer to the list below for future screening recommendations based on patient's age, sex and/or medical conditions. Orders for these recommended tests are listed in the plan section. The  patient has been provided with a written plan.    Health Maintenance List  Health Maintenance   Topic Date Due    HEPATITIS C SCREENING  Never done    LIPID PANEL  12/09/2025    BMI FOLLOWUP  12/19/2025    ANNUAL WELLNESS VISIT  01/14/2026    COLORECTAL CANCER SCREENING  07/31/2027    TDAP/TD VACCINES (3 - Td or Tdap) 12/07/2032    COVID-19 Vaccine  Completed    INFLUENZA VACCINE  Completed    Pneumococcal Vaccine 65+  Completed    ZOSTER VACCINE  Completed                                                                                                                                                CMS Preventative Services Quick Reference  Risk Factors Identified During Encounter  None Identified    The above risks/problems have been discussed with the patient.  Pertinent information has been shared with the patient in the After Visit Summary.  An After Visit Summary and PPPS were made available to the patient.    Follow Up:   Next Medicare Wellness visit to be scheduled in 1 year.         Additional E&M Note during same encounter follows:  Patient has additional, significant, and separately identifiable condition(s)/problem(s) that require work above and beyond the Medicare Wellness Visit     Chief Complaint  Medicare Wellness-subsequent    Subjective    HPI  Jhon is also being seen today for additional medical problem/s.       The patient is a 64-year-old male who presents for a Medicare wellness visit.    He reports persistent shortness of breath and a productive cough. He has no known allergies. He has not been hospitalized within the past year and reports no difficulties with ambulation. He does not have an advanced directive or living will in place and has expressed interest in obtaining information regarding these documents. His medical history includes a recent COVID-19 infection.    ALLERGIES  The patient has no known allergies.          Objective   Vital Signs:  /78 (BP Location: Right arm, Patient  "Position: Sitting, Cuff Size: Large Adult)   Pulse 92   Temp 97.5 °F (36.4 °C) (Temporal)   Ht 185.4 cm (73\")   Wt 114 kg (251 lb 12.8 oz)   SpO2 99%   BMI 33.22 kg/m²   Physical Exam  Constitutional:       General: He is not in acute distress.     Appearance: Normal appearance. He is well-developed and well-groomed. He is not ill-appearing, toxic-appearing or diaphoretic.   HENT:      Head: Normocephalic.      Right Ear: Tympanic membrane, ear canal and external ear normal.      Left Ear: Tympanic membrane, ear canal and external ear normal.      Nose: Nose normal. Congestion and rhinorrhea present.      Mouth/Throat:      Mouth: Mucous membranes are moist.      Pharynx: Oropharynx is clear. No oropharyngeal exudate or posterior oropharyngeal erythema.   Eyes:      General: Lids are normal.         Right eye: No discharge.         Left eye: No discharge.      Extraocular Movements: Extraocular movements intact.      Pupils: Pupils are equal, round, and reactive to light.   Neck:      Vascular: No carotid bruit.   Cardiovascular:      Rate and Rhythm: Normal rate and regular rhythm.      Pulses: Normal pulses.      Heart sounds: Normal heart sounds. No murmur heard.     No friction rub. No gallop.   Pulmonary:      Effort: Pulmonary effort is normal. No respiratory distress.      Breath sounds: Normal breath sounds. Decreased air movement present. No stridor. No wheezing, rhonchi or rales.   Chest:      Chest wall: No tenderness.   Abdominal:      General: Bowel sounds are normal. There is no distension.      Palpations: Abdomen is soft. There is no mass.      Tenderness: There is no abdominal tenderness. There is no right CVA tenderness, left CVA tenderness, guarding or rebound.      Hernia: No hernia is present.   Musculoskeletal:         General: No swelling or tenderness. Normal range of motion.      Cervical back: Normal range of motion and neck supple. No rigidity or tenderness.      Right lower leg: No " edema.      Left lower leg: No edema.   Lymphadenopathy:      Cervical: No cervical adenopathy.   Skin:     General: Skin is warm.      Capillary Refill: Capillary refill takes less than 2 seconds.      Coloration: Skin is not jaundiced.      Findings: No bruising, erythema or rash.   Neurological:      General: No focal deficit present.      Mental Status: He is alert and oriented to person, place, and time.      Motor: Motor function is intact. No weakness.      Coordination: Coordination is intact.      Gait: Gait is intact. Gait normal.   Psychiatric:         Attention and Perception: Attention normal.         Mood and Affect: Mood normal.         Speech: Speech normal.         Behavior: Behavior normal.         Cognition and Memory: Cognition normal.         Judgment: Judgment normal.               The following data was reviewed by: MATTHEW Gaston on 01/14/2025:        Results  Laboratory Studies  A1c was 5.6. Thyroid function was normal. Kidney function was normal. Blood sugar was normal. White blood cell count was normal. Cholesterol levels were normal.              Assessment and Plan        1. Medicare wellness visit.  His A1c levels have improved, currently at 5.6. Thyroid function, kidney function, blood glucose levels, white blood cell count, and cholesterol levels are all within normal ranges. He has been provided with information regarding living albert and healthcare surrogates, which he will complete and return for inclusion in his medical record.    2. Suspected pneumonia.  He reports shortness of breath and coughing up phlegm. A chest x-ray will be conducted to confirm the presence of pneumonia. He will be started on Augmentin 875 mg twice a day for 10 days and a steroid pack. The results of the chest x-ray will be communicated to him upon receipt.            Follow Up   Return in about 3 months (around 4/14/2025), or xray today.  Patient was given instructions and counseling regarding his  condition or for health maintenance advice. Please see specific information pulled into the AVS if appropriate.  Patient or patient representative verbalized consent for the use of Ambient Listening during the visit with  MATTHEW Gaston for chart documentation. 1/14/2025  14:04 EST

## 2025-01-16 ENCOUNTER — DISEASE STATE MANAGEMENT VISIT (OUTPATIENT)
Dept: PHARMACY | Facility: HOSPITAL | Age: 65
End: 2025-01-16
Payer: MEDICARE

## 2025-01-16 VITALS
HEIGHT: 73 IN | WEIGHT: 251.2 LBS | DIASTOLIC BLOOD PRESSURE: 88 MMHG | BODY MASS INDEX: 33.29 KG/M2 | SYSTOLIC BLOOD PRESSURE: 141 MMHG | HEART RATE: 70 BPM

## 2025-01-16 RX ORDER — SEMAGLUTIDE 2.4 MG/.75ML
2.4 INJECTION, SOLUTION SUBCUTANEOUS WEEKLY
Qty: 3 ML | Refills: 0 | Status: SHIPPED | OUTPATIENT
Start: 2025-01-16

## 2025-01-16 NOTE — PROGRESS NOTES
[facility]  Weight Management Program      Delmar Denise Sr. is a 64 y.o. male referred to the [facility] by Keisha Dial for clinical pharmacy and specialty pharmacy management of GLP1 for weight management/ASCVD.  The patient denies a personal history of thyroid cancer and denies a personal history of pancreatitis.     Unfortunately, patient is unaware of family history as he reports he was adopted. Screening for GLP-1 use pertinent to family history cannot be completed.     Delmar Denise Sr. has previously tried calorie counting and lifestyle changes for weight loss. Current weight loss efforts include calorie coutning.       Patient is currently on Wegovy 2.4 mg weekly. Patient denies any lumps/swelling/hoarseness in neck/throat or abdominal pain. Patient reports tolerating medication well without any side effects. Patient denies any trouble giving injection or any missed doses. Patient reports that he has not been doing well with exercise because of his hip pain. However, this will most likely get better because patient has surgery planned for 1/28/25. Patient does report that he is drinking 3-4 bottles of water per day. He also reports that he has eliminated late night eating- haven't ate late night in months. He reports his last time eating is around 6-7pm. He reports seeing quite a bit of appetite suppression.     Patient will be having hip surgery soon, and it is scheduled for January 28th, 2025. He is aware that he will need to let them know that he is on Wegovy and will likely need to hold prior to procedure. He was told his hold Wegovy prior to procedure. His last injection was on January 11th.  See below in plan for updated information regarding this.     Patient denies any changes to medications or allergies.         Relevant Past Medical History and Co-morbidities  Past Medical History:   Diagnosis Date    Arthritis     Back pain     Coronary artery disease  involving native coronary artery of native heart without angina pectoris s/p RCA PCI in 6/2022 08/06/2022    Degenerative disc disease, lumbar     Mixed hyperlipidemia 08/06/2022    NSTEMI (non-ST elevated myocardial infarction) 06/29/2022     Social History     Socioeconomic History    Marital status:    Tobacco Use    Smoking status: Never    Smokeless tobacco: Former     Types: Snuff   Vaping Use    Vaping status: Never Used   Substance and Sexual Activity    Alcohol use: Yes     Comment: occasionally    Drug use: No    Sexual activity: Defer     Partners: Female     Birth control/protection: None       Allergies  Patient has no known allergies.    Current Medication List    Current Outpatient Medications:     amoxicillin-clavulanate (AUGMENTIN) 875-125 MG per tablet, Take 1 tablet by mouth 2 (Two) Times a Day., Disp: 20 tablet, Rfl: 0    aspirin 81 MG EC tablet, Take 1 tablet by mouth Daily., Disp: 90 tablet, Rfl: 0    atorvastatin (LIPITOR) 80 MG tablet, Take 1 tablet by mouth Every Night., Disp: , Rfl:     cyclobenzaprine (FLEXERIL) 10 MG tablet, Take 1 tablet by mouth 2 (Two) Times a Day As Needed for Muscle Spasms., Disp: 60 tablet, Rfl: 0    HYDROcodone-acetaminophen (NORCO)  MG per tablet, Take 1 tablet by mouth Every Night. (Patient taking differently: Take 1 tablet by mouth Every Night. Takes prn. Hasn't taken in last few weeks), Disp: , Rfl:     methylPREDNISolone (MEDROL) 4 MG dose pack, TAKE BY MOUTH AS DIRECTED ON PACKAGE, Disp: 21 tablet, Rfl: 0    metoprolol tartrate (LOPRESSOR) 25 MG tablet, TAKE ONE TABLET BY MOUTH EVERY 12 HOURS, Disp: 180 tablet, Rfl: 1    nitroglycerin (NITROSTAT) 0.4 MG SL tablet, Place 1 tablet under the tongue Every 5 (Five) Minutes As Needed for Chest Pain (Only if SBP Greater Than 100). Take no more than 3 doses in 15 minutes., Disp: 30 tablet, Rfl: 12    Semaglutide-Weight Management (Wegovy) 2.4 MG/0.75ML solution auto-injector, Inject 2.4 mg under the  "skin into the appropriate area as directed 1 (One) Time Per Week., Disp: 3 mL, Rfl: 0  Medications that contribute to weight gain: None  Drug Interactions  Wegovy+ ASA: Increases risk for hypoglycemia  Weovy + Lopressor: Increases risk for hypoglycemia    Relevant Laboratory Values  Lab Results   Component Value Date    CHOL 85 12/09/2024    TRIG 50 12/09/2024    HDL 51 12/09/2024    LDL 21 12/09/2024     Lab Results   Component Value Date    GLUCOSE 90 12/09/2024    BUN 20 12/09/2024    CREATININE 1.11 12/09/2024    EGFRIFNONA 77 08/21/2020    BCR 18.0 12/09/2024    K 4.2 12/09/2024    CO2 26.0 12/09/2024    CALCIUM 9.2 12/09/2024    ALBUMIN 4.0 12/09/2024    AST 19 12/09/2024    ALT 14 12/09/2024     Body mass index is 33.14 kg/m².  Lab Results   Component Value Date    TSH 1.780 12/09/2024       Vaccinations:   Patient recommended to keep up with routine vaccinations.     Goals of Therapy  Clinical Goals or Therapeutic Targets: Prevent weight associated co-morbidities and complications      Date 8/2/24   8/30/24 9/27/24 10/24/24 11/20/24 12/19/24 1/16/25   Weight (lb) 269.4 lb 261 lb 260.2 lb 255 lb 254.2 lb 254.6 lb 251.2 lb   BMI kg/ 35.49 34.43 34.33 33.64 33.54 33.60 33.14   Waist Circumference (in)  52.75\" 52\" 51.75\" 51\" 51.5\" 49.5\" 47\"       Medication Assessment & Plan    Patient has current BMI of 33.14, which is considered Class I Obesity.  Patient has lost about 18.2 lbs since starting therapy. Congratulated patient on his success thus far!    Will re-order Wegovy 2.4 mg SC weekly.      See initial progress note regarding Keisha Dial's okay for patient to be on medication without being able to complete full screening information.     The following medications will need dose adjustments/closer monitoring once the GLP1 is started: NA    Discussed potential for increase risk of hypoglycemia with mediations. Patient aware to monitor for s/sx and rule of 15 discussed. Patient verbally acknowledged " understanding.     Patient reports that he has upcoming hip surgery and/or injections for hip issues. Patient advised to make sure all parties are aware that he is on Wegovy so that if it is needing to be held for any reason that they can advise him on this. Patient reported that he has told them he is taking the medication.  Patient updated the clinic today that he will has his surgery on January 28th 2025. He has spoken to Katharine Dial about doses that need to be held. Patient was told to hold Wegovy prior to surgery. His last injection was on January 11th.       Discussed lifestyle modifications, including diet and exercise.  Patient education provided.     Worked with patient to create SMART Goal(s):   Maintain water intake of at least 64 oz per day  Increase exercise (patient has surgery on 1/28/25) so exercise will increase after recovery      Discussed with patient:     Aim for protein in take of 1.5g/kg/day    Strength Training: Target all major muscle groups, 8-12 repetitions for each exercise and at least 2 x a week on non-consecutive days. With a total weekly target of 60 minutes/week.     Patient was counseled that they will need to discuss holding medication 2-4 weeks prior to colonoscopies or surgical procedures with their surgery physician due to risk of aspiration and/or incomplete bowel prep.      Patient has previously been advised of completing pertinent lab work.  Thyroid and lipid panel WNL     Will follow-up with patient in clinic in 4 weeks.     Malu Patel RPH  1/16/2025  08:23 EST

## 2025-02-13 ENCOUNTER — DISEASE STATE MANAGEMENT VISIT (OUTPATIENT)
Dept: PHARMACY | Facility: HOSPITAL | Age: 65
End: 2025-02-13
Payer: MEDICARE

## 2025-02-13 VITALS
BODY MASS INDEX: 32.47 KG/M2 | DIASTOLIC BLOOD PRESSURE: 84 MMHG | HEART RATE: 71 BPM | WEIGHT: 245 LBS | SYSTOLIC BLOOD PRESSURE: 139 MMHG | HEIGHT: 73 IN

## 2025-02-13 RX ORDER — SEMAGLUTIDE 2.4 MG/.75ML
2.4 INJECTION, SOLUTION SUBCUTANEOUS WEEKLY
Qty: 3 ML | Refills: 0 | Status: SHIPPED | OUTPATIENT
Start: 2025-02-13

## 2025-02-13 NOTE — PROGRESS NOTES
[facility]  Weight Management Program      Delmar Denise Sr. is a 64 y.o. male referred to the [facility] by Keisha Dial for clinical pharmacy and specialty pharmacy management of GLP1 for weight management/ASCVD.  The patient denies a personal history of thyroid cancer and denies a personal history of pancreatitis.     Unfortunately, patient is unaware of family history as he reports he was adopted. Screening for GLP-1 use pertinent to family history cannot be completed.     Delmar Denise Sr. has previously tried calorie counting and lifestyle changes for weight loss. Current weight loss efforts include calorie coutning.       Patient is currently on Wegovy 2.4 mg weekly. Patient denies any lumps/swelling/hoarseness in neck/throat or abdominal pain. Patient reports tolerating medication well without any side effects. Patient denies any trouble giving injection or any missed doses. Patient reports that he has not been doing well with exercise because of his hip pain. However, patient just had hip surgery 1/28/25 and plans for this to get better. Patient does report that he is drinking 3-4 bottles of water per day. He also reports that he has eliminated late night eating- haven't ate late night in months. He reports his last time eating is around 6-7pm. He reports seeing quite a bit of appetite suppression.     Patient denies any changes to medications or allergies.         Relevant Past Medical History and Co-morbidities  Past Medical History:   Diagnosis Date    Arthritis     Back pain     Coronary artery disease involving native coronary artery of native heart without angina pectoris s/p RCA PCI in 6/2022 08/06/2022    Degenerative disc disease, lumbar     Mixed hyperlipidemia 08/06/2022    NSTEMI (non-ST elevated myocardial infarction) 06/29/2022     Social History     Socioeconomic History    Marital status:    Tobacco Use    Smoking status: Never    Smokeless  tobacco: Former     Types: Snuff   Vaping Use    Vaping status: Never Used   Substance and Sexual Activity    Alcohol use: Yes     Comment: occasionally    Drug use: No    Sexual activity: Defer     Partners: Female     Birth control/protection: None       Allergies  Patient has no known allergies.    Current Medication List    Current Outpatient Medications:     aspirin 81 MG EC tablet, Take 1 tablet by mouth Daily., Disp: 90 tablet, Rfl: 0    atorvastatin (LIPITOR) 80 MG tablet, Take 1 tablet by mouth Every Night., Disp: , Rfl:     cyclobenzaprine (FLEXERIL) 10 MG tablet, Take 1 tablet by mouth 2 (Two) Times a Day As Needed for Muscle Spasms., Disp: 60 tablet, Rfl: 0    metoprolol tartrate (LOPRESSOR) 25 MG tablet, TAKE ONE TABLET BY MOUTH EVERY 12 HOURS, Disp: 180 tablet, Rfl: 1    nitroglycerin (NITROSTAT) 0.4 MG SL tablet, Place 1 tablet under the tongue Every 5 (Five) Minutes As Needed for Chest Pain (Only if SBP Greater Than 100). Take no more than 3 doses in 15 minutes., Disp: 30 tablet, Rfl: 12    Semaglutide-Weight Management (Wegovy) 2.4 MG/0.75ML solution auto-injector, Inject 0.75 mL under the skin into the appropriate area as directed 1 (One) Time Per Week., Disp: 3 mL, Rfl: 0    HYDROcodone-acetaminophen (NORCO)  MG per tablet, Take 1 tablet by mouth Every Night. (Patient not taking: Reported on 2/13/2025), Disp: , Rfl:   Medications that contribute to weight gain: None  Drug Interactions  Wegovy+ ASA: Increases risk for hypoglycemia  Weovy + Lopressor: Increases risk for hypoglycemia    Relevant Laboratory Values  Lab Results   Component Value Date    CHOL 85 12/09/2024    TRIG 50 12/09/2024    HDL 51 12/09/2024    LDL 21 12/09/2024     Lab Results   Component Value Date    GLUCOSE 90 12/09/2024    BUN 20 12/09/2024    CREATININE 1.11 12/09/2024    EGFRIFNONA 77 08/21/2020    BCR 18.0 12/09/2024    K 4.2 12/09/2024    CO2 26.0 12/09/2024    CALCIUM 9.2 12/09/2024    ALBUMIN 4.0 12/09/2024     "AST 19 12/09/2024    ALT 14 12/09/2024     Body mass index is 32.33 kg/m².  Lab Results   Component Value Date    TSH 1.780 12/09/2024       Vaccinations:   Patient recommended to keep up with routine vaccinations.     Goals of Therapy  Clinical Goals or Therapeutic Targets: Prevent weight associated co-morbidities and complications      Date 8/2/24   8/30/24 9/27/24 10/24/24 11/20/24 12/19/24 1/16/25 2/13/25   Weight (lb) 269.4 lb 261 lb 260.2 lb 255 lb 254.2 lb 254.6 lb 251.2 lb 245.0 lb   BMI kg/ 35.49 34.43 34.33 33.64 33.54 33.60 33.14 32.33   Waist Circumference (in)  52.75\" 52\" 51.75\" 51\" 51.5\" 49.5\" 47\" 49\"       Medication Assessment & Plan    Patient has current BMI of 32.33, which is considered Class I Obesity.  Patient has lost about 24.4 lbs since starting therapy. Congratulated patient on his success thus far!    Will re-order Wegovy 2.4 mg SC weekly.      See initial progress note regarding Keisha Dial's okay for patient to be on medication without being able to complete full screening information.     The following medications will need dose adjustments/closer monitoring once the GLP1 is started: NA    Discussed potential for increase risk of hypoglycemia with mediations. Patient aware to monitor for s/sx and rule of 15 discussed. Patient verbally acknowledged understanding.     Patient reports that he has upcoming hip surgery and/or injections for hip issues. Patient advised to make sure all parties are aware that he is on Wegovy so that if it is needing to be held for any reason that they can advise him on this. Patient reported that he has told them he is taking the medication.  Patient had his surgery on January 28th, 2025. He spoke to Katharine Dial about doses that needed to be held.       Discussed lifestyle modifications, including diet and exercise.  Patient education provided.     Worked with patient to create SMART Goal(s):   Maintain water intake of at least 64 oz per day  Increase exercise " (patient has surgery on 1/28/25) so exercise will increase after recovery      Discussed with patient:     Aim for protein in take of 1.5g/kg/day    Strength Training: Target all major muscle groups, 8-12 repetitions for each exercise and at least 2 x a week on non-consecutive days. With a total weekly target of 60 minutes/week.     Patient was counseled that they will need to discuss holding medication 2-4 weeks prior to colonoscopies or surgical procedures with their surgery physician due to risk of aspiration and/or incomplete bowel prep.      Patient has previously been advised of completing pertinent lab work.  Thyroid and lipid panel WNL     Will follow-up with patient in clinic in 4 weeks.     Noris Gonzalez RPH  2/13/2025  08:20 EST

## 2025-03-07 DIAGNOSIS — M25.551 PAIN OF RIGHT HIP: ICD-10-CM

## 2025-03-10 RX ORDER — CYCLOBENZAPRINE HCL 10 MG
10 TABLET ORAL 2 TIMES DAILY PRN
Qty: 60 TABLET | Refills: 0 | Status: SHIPPED | OUTPATIENT
Start: 2025-03-10

## 2025-03-13 ENCOUNTER — DISEASE STATE MANAGEMENT VISIT (OUTPATIENT)
Dept: PHARMACY | Facility: HOSPITAL | Age: 65
End: 2025-03-13
Payer: MEDICARE

## 2025-03-13 VITALS
DIASTOLIC BLOOD PRESSURE: 82 MMHG | SYSTOLIC BLOOD PRESSURE: 127 MMHG | WEIGHT: 247.2 LBS | HEIGHT: 73 IN | HEART RATE: 77 BPM | BODY MASS INDEX: 32.76 KG/M2

## 2025-03-13 RX ORDER — SEMAGLUTIDE 2.4 MG/.75ML
2.4 INJECTION, SOLUTION SUBCUTANEOUS WEEKLY
Qty: 3 ML | Refills: 0 | Status: SHIPPED | OUTPATIENT
Start: 2025-03-13

## 2025-03-13 NOTE — PROGRESS NOTES
[facility]  Weight Management Program      Delmar Denise Sr. is a 64 y.o. male referred to the [facility] by Keisha Dial for clinical pharmacy and specialty pharmacy management of GLP1 for weight management/ASCVD.  The patient denies a personal history of thyroid cancer and denies a personal history of pancreatitis.     Unfortunately, patient is unaware of family history as he reports he was adopted. Screening for GLP-1 use pertinent to family history cannot be completed.     Delmar Denise Sr. has previously tried calorie counting and lifestyle changes for weight loss. Current weight loss efforts include calorie coutning.       Patient is currently on Wegovy 2.4 mg weekly. Patient denies any lumps/swelling/hoarseness in neck/throat or abdominal pain. Patient reports tolerating medication well without any side effects. Patient denies any trouble giving injection or any missed doses.     Patient reports that he has been doing better with exercise this week because the weather has been warmer. However, patient just had hip surgery 1/28/25. Patient does report that he is drinking 3-4 bottles of water per day. He also reports that he has eliminated late night eating- haven't ate late night in months. He reports his last time eating is around 6-7pm. He reports seeing quite a bit of appetite suppression.     Patient denies any changes to medications or allergies.       Relevant Past Medical History and Co-morbidities  Past Medical History:   Diagnosis Date    Arthritis     Back pain     Coronary artery disease involving native coronary artery of native heart without angina pectoris s/p RCA PCI in 6/2022 08/06/2022    Degenerative disc disease, lumbar     Mixed hyperlipidemia 08/06/2022    NSTEMI (non-ST elevated myocardial infarction) 06/29/2022     Social History     Socioeconomic History    Marital status:    Tobacco Use    Smoking status: Never    Smokeless tobacco:  Former     Types: Snuff   Vaping Use    Vaping status: Never Used   Substance and Sexual Activity    Alcohol use: Yes     Comment: occasionally    Drug use: No    Sexual activity: Defer     Partners: Female     Birth control/protection: None       Allergies  Patient has no known allergies.    Current Medication List    Current Outpatient Medications:     aspirin 81 MG EC tablet, Take 1 tablet by mouth Daily., Disp: 90 tablet, Rfl: 0    atorvastatin (LIPITOR) 80 MG tablet, Take 1 tablet by mouth Every Night., Disp: , Rfl:     cyclobenzaprine (FLEXERIL) 10 MG tablet, TAKE 1 TABLET BY MOUTH 2 TIMES A DAY AS NEEDED FOR MUSCLE SPASMS, Disp: 60 tablet, Rfl: 0    HYDROcodone-acetaminophen (NORCO)  MG per tablet, Take 1 tablet by mouth Every Night., Disp: , Rfl:     metoprolol tartrate (LOPRESSOR) 25 MG tablet, TAKE ONE TABLET BY MOUTH EVERY 12 HOURS, Disp: 180 tablet, Rfl: 1    nitroglycerin (NITROSTAT) 0.4 MG SL tablet, Place 1 tablet under the tongue Every 5 (Five) Minutes As Needed for Chest Pain (Only if SBP Greater Than 100). Take no more than 3 doses in 15 minutes., Disp: 30 tablet, Rfl: 12    Semaglutide-Weight Management (Wegovy) 2.4 MG/0.75ML solution auto-injector, Inject 0.75 mL under the skin into the appropriate area as directed 1 (One) Time Per Week., Disp: 3 mL, Rfl: 0  Medications that contribute to weight gain: None  Drug Interactions  Wegovy+ ASA: Increases risk for hypoglycemia  Weovy + Lopressor: Increases risk for hypoglycemia    Relevant Laboratory Values  Lab Results   Component Value Date    CHOL 85 12/09/2024    TRIG 50 12/09/2024    HDL 51 12/09/2024    LDL 21 12/09/2024     Lab Results   Component Value Date    GLUCOSE 90 12/09/2024    BUN 20 12/09/2024    CREATININE 1.11 12/09/2024    EGFRIFNONA 77 08/21/2020    BCR 18.0 12/09/2024    K 4.2 12/09/2024    CO2 26.0 12/09/2024    CALCIUM 9.2 12/09/2024    ALBUMIN 4.0 12/09/2024    AST 19 12/09/2024    ALT 14 12/09/2024     Body mass index is  "32.62 kg/m².  Lab Results   Component Value Date    TSH 1.780 12/09/2024       Vaccinations:   Patient recommended to keep up with routine vaccinations.     Goals of Therapy  Clinical Goals or Therapeutic Targets: Prevent weight associated co-morbidities and complications      Date 8/2/24   8/30/24 9/27/24 10/24/24 11/20/24 12/19/24 1/16/25 2/13/25 3/13/25   Weight (lb) 269.4 lb 261 lb 260.2 lb 255 lb 254.2 lb 254.6 lb 251.2 lb 245.0 lb 247.2 lb   BMI kg/ 35.49 34.43 34.33 33.64 33.54 33.60 33.14 32.33 32.62   Waist Circumference (in)  52.75\" 52\" 51.75\" 51\" 51.5\" 49.5\" 47\" 49\" 48\"       Medication Assessment & Plan    Patient has current BMI of 32.62, which is considered Class I Obesity.  Patient did not lose weight this month but congratulated patient on his success thus far!    Will re-order Wegovy 2.4 mg SC weekly.      See initial progress note regarding Keisha Dial's okay for patient to be on medication without being able to complete full screening information.     The following medications will need dose adjustments/closer monitoring once the GLP1 is started: NA    Discussed potential for increase risk of hypoglycemia with mediations. Patient aware to monitor for s/sx and rule of 15 discussed. Patient verbally acknowledged understanding.     Patient reports that he has upcoming hip surgery and/or injections for hip issues. Patient advised to make sure all parties are aware that he is on Wegovy so that if it is needing to be held for any reason that they can advise him on this. Patient reported that he has told them he is taking the medication.  Patient had his surgery on January 28th, 2025. He spoke to Katharine Dial about doses that needed to be held.       Discussed lifestyle modifications, including diet and exercise.  Patient education provided.     Worked with patient to create SMART Goal(s):   Maintain water intake of at least 64 oz per day  Increase exercise (patient has surgery on 1/28/25) so exercise will " increase after recovery      Discussed with patient:     Aim for protein in take of 1.5g/kg/day    Strength Training: Target all major muscle groups, 8-12 repetitions for each exercise and at least 2 x a week on non-consecutive days. With a total weekly target of 60 minutes/week.     Patient was counseled that they will need to discuss holding medication 2-4 weeks prior to colonoscopies or surgical procedures with their surgery physician due to risk of aspiration and/or incomplete bowel prep.      Patient has previously been advised of completing pertinent lab work.  Thyroid and lipid panel WNL     Will follow-up with patient in clinic in 4 weeks.     Noris Gonzalez RPH  3/13/2025  08:17 EDT

## 2025-04-10 ENCOUNTER — DISEASE STATE MANAGEMENT VISIT (OUTPATIENT)
Dept: PHARMACY | Facility: HOSPITAL | Age: 65
End: 2025-04-10
Payer: MEDICARE

## 2025-04-10 VITALS
HEART RATE: 83 BPM | WEIGHT: 248.6 LBS | SYSTOLIC BLOOD PRESSURE: 128 MMHG | BODY MASS INDEX: 32.95 KG/M2 | DIASTOLIC BLOOD PRESSURE: 88 MMHG | HEIGHT: 73 IN

## 2025-04-10 RX ORDER — SEMAGLUTIDE 2.4 MG/.75ML
2.4 INJECTION, SOLUTION SUBCUTANEOUS WEEKLY
Qty: 3 ML | Refills: 0 | Status: SHIPPED | OUTPATIENT
Start: 2025-04-10

## 2025-04-10 NOTE — PROGRESS NOTES
[facility]  Weight Management Program      Delmar Denise Sr. is a 64 y.o. male referred to the [facility] by Keisha Dial for clinical pharmacy and specialty pharmacy management of GLP1 for weight management/ASCVD.  The patient denies a personal history of thyroid cancer and denies a personal history of pancreatitis.     Unfortunately, patient is unaware of family history as he reports he was adopted. Screening for GLP-1 use pertinent to family history cannot be completed.     Delmar Denise Sr. has previously tried calorie counting and lifestyle changes for weight loss. Current weight loss efforts include calorie coutning.       Patient is currently on Wegovy 2.4 mg weekly. Patient denies any lumps/swelling/hoarseness in neck/throat or abdominal pain. Patient reports tolerating medication well without any side effects. Patient denies any trouble giving injection or any missed doses.     Patient reports that he has been doing better with exercise recently because the weather has been warmer. When the weather is nice, he reports he will exercise around 5 days per week for a couple hours. However, patient just had hip surgery 1/28/25. Patient reports he has not had any problems post-surgery. Patient does report that he is drinking 3-4 bottles (16 oz) of water per day. He also reports that he has eliminated late night eating- haven't ate late night in months. He reports his last time eating is around 6-7pm. He reports eating fruits and vegetables everyday.  He reports seeing quite a bit of appetite suppression.     Patient denies any changes to medications or allergies.       Relevant Past Medical History and Co-morbidities  Past Medical History:   Diagnosis Date    Arthritis     Back pain     Coronary artery disease involving native coronary artery of native heart without angina pectoris s/p RCA PCI in 6/2022 08/06/2022    Degenerative disc disease, lumbar     Mixed  hyperlipidemia 08/06/2022    NSTEMI (non-ST elevated myocardial infarction) 06/29/2022     Social History     Socioeconomic History    Marital status:    Tobacco Use    Smoking status: Never    Smokeless tobacco: Former     Types: Snuff   Vaping Use    Vaping status: Never Used   Substance and Sexual Activity    Alcohol use: Yes     Comment: occasionally    Drug use: No    Sexual activity: Defer     Partners: Female     Birth control/protection: None       Allergies  Patient has no known allergies.    Current Medication List    Current Outpatient Medications:     aspirin 81 MG EC tablet, Take 1 tablet by mouth Daily., Disp: 90 tablet, Rfl: 0    atorvastatin (LIPITOR) 80 MG tablet, Take 1 tablet by mouth Every Night., Disp: , Rfl:     cyclobenzaprine (FLEXERIL) 10 MG tablet, TAKE 1 TABLET BY MOUTH 2 TIMES A DAY AS NEEDED FOR MUSCLE SPASMS, Disp: 60 tablet, Rfl: 0    HYDROcodone-acetaminophen (NORCO)  MG per tablet, Take 1 tablet by mouth Every Night., Disp: , Rfl:     metoprolol tartrate (LOPRESSOR) 25 MG tablet, TAKE ONE TABLET BY MOUTH EVERY 12 HOURS, Disp: 180 tablet, Rfl: 1    nitroglycerin (NITROSTAT) 0.4 MG SL tablet, Place 1 tablet under the tongue Every 5 (Five) Minutes As Needed for Chest Pain (Only if SBP Greater Than 100). Take no more than 3 doses in 15 minutes., Disp: 30 tablet, Rfl: 12    Semaglutide-Weight Management (Wegovy) 2.4 MG/0.75ML solution auto-injector, Inject 0.75 mL under the skin into the appropriate area as directed 1 (One) Time Per Week., Disp: 3 mL, Rfl: 0  Medications that contribute to weight gain: None  Drug Interactions  Wegovy+ ASA: Increases risk for hypoglycemia  Weovy + Lopressor: Increases risk for hypoglycemia    Relevant Laboratory Values  Lab Results   Component Value Date    CHOL 85 12/09/2024    TRIG 50 12/09/2024    HDL 51 12/09/2024    LDL 21 12/09/2024     Lab Results   Component Value Date    GLUCOSE 90 12/09/2024    BUN 20 12/09/2024    CREATININE 1.11  "12/09/2024    EGFRIFNONA 77 08/21/2020    BCR 18.0 12/09/2024    K 4.2 12/09/2024    CO2 26.0 12/09/2024    CALCIUM 9.2 12/09/2024    ALBUMIN 4.0 12/09/2024    AST 19 12/09/2024    ALT 14 12/09/2024     There is no height or weight on file to calculate BMI.  Lab Results   Component Value Date    TSH 1.780 12/09/2024       Vaccinations:   Patient recommended to keep up with routine vaccinations.     Goals of Therapy  Clinical Goals or Therapeutic Targets: Prevent weight associated co-morbidities and complications      Date 8/2/24   8/30/24 9/27/24 10/24/24 11/20/24 12/19/24 1/16/25 2/13/25 3/13/25   Weight (lb) 269.4 lb 261 lb 260.2 lb 255 lb 254.2 lb 254.6 lb 251.2 lb 245.0 lb 247.2 lb   BMI kg/ 35.49 34.43 34.33 33.64 33.54 33.60 33.14 32.33 32.62   Waist Circumference (in)  52.75\" 52\" 51.75\" 51\" 51.5\" 49.5\" 47\" 49\" 48\"     Date 4/10/2025   Weight (lb) 248.6 lb   BMI kg/ 32.81    Waist Circumference (in)  49.75\"       Medication Assessment & Plan    Patient has current BMI of 32.81, which is considered Class I Obesity.  Patient did not lose weight this month but congratulated patient on his success thus far!    Will re-order Wegovy 2.4 mg SC weekly.      See initial progress note regarding Keisha Dial's okay for patient to be on medication without being able to complete full screening information.     The following medications will need dose adjustments/closer monitoring once the GLP1 is started: NA    Discussed potential for increase risk of hypoglycemia with mediations. Patient aware to monitor for s/sx and rule of 15 discussed. Patient verbally acknowledged understanding.     Patient reports that he has upcoming hip surgery and/or injections for hip issues. Patient advised to make sure all parties are aware that he is on Wegovy so that if it is needing to be held for any reason that they can advise him on this. Patient reported that he has told them he is taking the medication.  Patient had his surgery on January " 28th, 2025. He spoke to Katharine Dial about doses that needed to be held.       Discussed lifestyle modifications, including diet and exercise.  Patient education provided.     Worked with patient to create SMART Goal(s):   Maintain water intake of at least 64 oz per day  Increase exercise (patient had surgery on 1/28/25) so exercise will increase after recovery      Discussed with patient:     Aim for protein in take of 1.5g/kg/day    Strength Training: Target all major muscle groups, 8-12 repetitions for each exercise and at least 2 x a week on non-consecutive days. With a total weekly target of 60 minutes/week.     Patient was counseled that they will need to discuss holding medication 2-4 weeks prior to colonoscopies or surgical procedures with their surgery physician due to risk of aspiration and/or incomplete bowel prep.      Patient has previously been advised of completing pertinent lab work.  Thyroid and lipid panel WNL     Will follow-up with patient in clinic in 4 weeks.     Malu Patel RPH  4/10/2025  08:11 EDT

## 2025-04-14 RX ORDER — METOPROLOL TARTRATE 25 MG/1
25 TABLET, FILM COATED ORAL EVERY 12 HOURS
Qty: 180 TABLET | Refills: 1 | Status: SHIPPED | OUTPATIENT
Start: 2025-04-14

## 2025-04-15 ENCOUNTER — OFFICE VISIT (OUTPATIENT)
Dept: FAMILY MEDICINE CLINIC | Facility: CLINIC | Age: 65
End: 2025-04-15
Payer: MEDICARE

## 2025-04-15 ENCOUNTER — LAB (OUTPATIENT)
Dept: LAB | Facility: HOSPITAL | Age: 65
End: 2025-04-15
Payer: MEDICARE

## 2025-04-15 VITALS
HEIGHT: 73 IN | OXYGEN SATURATION: 99 % | TEMPERATURE: 97.3 F | HEART RATE: 82 BPM | WEIGHT: 250.2 LBS | SYSTOLIC BLOOD PRESSURE: 120 MMHG | DIASTOLIC BLOOD PRESSURE: 78 MMHG | BODY MASS INDEX: 33.16 KG/M2

## 2025-04-15 DIAGNOSIS — I25.10 CORONARY ARTERY DISEASE INVOLVING NATIVE CORONARY ARTERY OF NATIVE HEART WITHOUT ANGINA PECTORIS: Primary | Chronic | ICD-10-CM

## 2025-04-15 DIAGNOSIS — I25.10 CORONARY ARTERY DISEASE INVOLVING NATIVE CORONARY ARTERY OF NATIVE HEART WITHOUT ANGINA PECTORIS: Chronic | ICD-10-CM

## 2025-04-15 DIAGNOSIS — M25.551 PAIN OF RIGHT HIP: ICD-10-CM

## 2025-04-15 DIAGNOSIS — E78.2 MIXED HYPERLIPIDEMIA: Chronic | ICD-10-CM

## 2025-04-15 PROCEDURE — 83695 ASSAY OF LIPOPROTEIN(A): CPT

## 2025-04-15 PROCEDURE — 36415 COLL VENOUS BLD VENIPUNCTURE: CPT

## 2025-04-15 RX ORDER — CYCLOBENZAPRINE HCL 10 MG
10 TABLET ORAL 2 TIMES DAILY PRN
Qty: 60 TABLET | Refills: 0 | Status: SHIPPED | OUTPATIENT
Start: 2025-04-15

## 2025-04-15 NOTE — PROGRESS NOTES
"Chief Complaint  Hypertension (Follow up )    Subjective          Delmar Denise Sr. presents to [facility]  Hypertension      History of Present Illness  The patient is a 64-year-old male who presents for a follow-up appointment.    Management of his condition has included the use of Flexeril. A planned surgery was postponed due to dank influenza in 01/2025, which required an extended recovery period. Concerns about potential pneumonia development post-surgery delayed clearance until the first week of 03/2025. Additionally, a previous COVID-19 infection was reported, which was less severe compared to the influenza experience.    Review of Systems      Objective   Vital Signs:   /78 (BP Location: Right arm, Patient Position: Sitting)   Pulse 82   Temp 97.3 °F (36.3 °C) (Temporal)   Ht 185.4 cm (72.99\")   Wt 113 kg (250 lb 3.2 oz)   SpO2 99%   BMI 33.02 kg/m²     Physical Exam  Respiratory: Clear to auscultation, no wheezing, rales or rhonchi    Physical Exam  Constitutional:       General: He is not in acute distress.     Appearance: Normal appearance. He is well-developed and well-groomed. He is not ill-appearing, toxic-appearing or diaphoretic.   HENT:      Head: Normocephalic.      Nose: Nose normal. No congestion or rhinorrhea.      Mouth/Throat:      Mouth: Mucous membranes are moist.      Pharynx: Oropharynx is clear. No oropharyngeal exudate or posterior oropharyngeal erythema.   Eyes:      General: Lids are normal.         Right eye: No discharge.         Left eye: No discharge.      Extraocular Movements: Extraocular movements intact.      Pupils: Pupils are equal, round, and reactive to light.   Neck:      Vascular: No carotid bruit.   Cardiovascular:      Rate and Rhythm: Normal rate and regular rhythm.      Pulses: Normal pulses.      Heart sounds: Normal heart sounds. No murmur heard.     No friction rub. No gallop.   Pulmonary:      Effort: Pulmonary " effort is normal. No respiratory distress.      Breath sounds: Normal breath sounds. No stridor. No wheezing, rhonchi or rales.   Chest:      Chest wall: No tenderness.   Abdominal:      General: Bowel sounds are normal. There is no distension.      Palpations: Abdomen is soft. There is no mass.      Tenderness: There is no abdominal tenderness. There is no right CVA tenderness, left CVA tenderness, guarding or rebound.      Hernia: No hernia is present.   Musculoskeletal:         General: No swelling or tenderness. Normal range of motion.      Cervical back: Normal range of motion and neck supple. No rigidity or tenderness.      Right lower leg: No edema.      Left lower leg: No edema.   Lymphadenopathy:      Cervical: No cervical adenopathy.   Skin:     General: Skin is warm.      Capillary Refill: Capillary refill takes less than 2 seconds.      Coloration: Skin is not jaundiced.      Findings: No bruising, erythema or rash.   Neurological:      General: No focal deficit present.      Mental Status: He is alert and oriented to person, place, and time.      Motor: Motor function is intact. No weakness.      Coordination: Coordination is intact.      Gait: Gait is intact. Gait normal.   Psychiatric:         Attention and Perception: Attention normal.         Mood and Affect: Mood normal.         Speech: Speech normal.         Behavior: Behavior normal.         Cognition and Memory: Cognition normal.         Judgment: Judgment normal.        Result Review :          Results                Assessment and Plan    Diagnoses and all orders for this visit:    1. Pain of right hip  -     cyclobenzaprine (FLEXERIL) 10 MG tablet; Take 1 tablet by mouth 2 (Two) Times a Day As Needed for Muscle Spasms.  Dispense: 60 tablet; Refill: 0      Assessment & Plan  1. Post-influenza status.  - Reported having the flu in 01/2025, which delayed his scheduled surgery due to concerns about developing pneumonia.  - Cleared for surgery in  the first week of 03/2025 but decided to postpone it until the fall to avoid being laid up during the summer.        Patient's Body mass index is 33.02 kg/m². indicating that he is obese (BMI >30). Obesity-related health conditions include the following: hypertension and dyslipidemias. Obesity is unchanged. BMI is is above average; BMI management plan is completed. We discussed low calorie, low carb based diet program, portion control, and increasing exercise..    Follow Up   Return in about 3 months (around 7/15/2025).  Patient was given instructions and counseling regarding his condition or for health maintenance advice. Please see specific information pulled into the AVS if appropriate.     This document has been electronically signed by MATTHEW Gaston  April 15, 2025 10:30 EDT     Patient or patient representative verbalized consent for the use of Ambient Listening during the visit with  MATTHEW Gaston for chart documentation. 4/15/2025  10:31 EDT

## 2025-04-16 LAB — LPA SERPL-SCNC: <8.4 NMOL/L

## 2025-04-21 RX ORDER — ATORVASTATIN CALCIUM 80 MG/1
80 TABLET, FILM COATED ORAL NIGHTLY
Qty: 90 TABLET | Refills: 3 | Status: SHIPPED | OUTPATIENT
Start: 2025-04-21

## 2025-04-21 NOTE — TELEPHONE ENCOUNTER
Caller: Monae Denise    Relationship: Emergency Contact    Best call back number: [phone] (home)  [phone]    Requested Prescriptions:   Requested Prescriptions     Pending Prescriptions Disp Refills    atorvastatin (LIPITOR) 80 MG tablet 90 tablet      Sig: Take 1 tablet by mouth Every Night.        Pharmacy where request should be sent: [facility] [id number] [place], [place] - [address] AT [facility] [address] [facility] [phone] [facility] [phone]      Last office visit with prescribing clinician: 12/12/2024   Last telemedicine visit with prescribing clinician: Visit date not found   Next office visit with prescribing clinician: 6/12/2025     Additional details provided by patient:     Does the patient have less than a 3 day supply:  [] Yes  [x] No    Would you like a call back once the refill request has been completed: [x] Yes [] No    If the office needs to give you a call back, can they leave a voicemail: [x] Yes [] No    Shravan Saenz Rep   04/21/25 10:09 EDT

## 2025-05-08 ENCOUNTER — DISEASE STATE MANAGEMENT VISIT (OUTPATIENT)
Dept: PHARMACY | Facility: HOSPITAL | Age: 65
End: 2025-05-08
Payer: MEDICARE

## 2025-05-08 VITALS
DIASTOLIC BLOOD PRESSURE: 86 MMHG | HEIGHT: 73 IN | WEIGHT: 248.8 LBS | BODY MASS INDEX: 32.97 KG/M2 | HEART RATE: 74 BPM | SYSTOLIC BLOOD PRESSURE: 137 MMHG

## 2025-05-08 RX ORDER — SEMAGLUTIDE 2.4 MG/.75ML
2.4 INJECTION, SOLUTION SUBCUTANEOUS WEEKLY
Qty: 3 ML | Refills: 0 | Status: SHIPPED | OUTPATIENT
Start: 2025-05-08

## 2025-05-08 NOTE — PROGRESS NOTES
[facility]  Weight Management Program      Delmar Denise Sr. is a 64 y.o. male referred to the [facility] by Keisha Dial for clinical pharmacy and specialty pharmacy management of GLP1 for weight management/ASCVD.  The patient denies a personal history of thyroid cancer and denies a personal history of pancreatitis.     Unfortunately, patient is unaware of family history as he reports he was adopted. Screening for GLP-1 use pertinent to family history cannot be completed.     Delmar Denise Sr. has previously tried calorie counting and lifestyle changes for weight loss. Patient just had hip surgery 1/28/25. Patient reports he has not had any problems post-surgery.       Patient is currently on Wegovy 2.4 mg weekly. Patient denies any lumps/swelling/hoarseness in neck/throat or abdominal pain. Patient reports tolerating medication well without any side effects. Patient denies any trouble giving injection or any missed doses.     Patient reports that he has been doing better with exercise and does more each day and is tolerating it well.  Patient reports that he is meeting his water intake goal and has continued to eliminate late night eating- haven't ate late night in months. He reports his last time eating is around 6-7pm.     Patient denies any changes to medications or allergies.       Relevant Past Medical History and Co-morbidities  Past Medical History:   Diagnosis Date    Arthritis     Back pain     Coronary artery disease involving native coronary artery of native heart without angina pectoris s/p RCA PCI in 6/2022 08/06/2022    Degenerative disc disease, lumbar     Mixed hyperlipidemia 08/06/2022    NSTEMI (non-ST elevated myocardial infarction) 06/29/2022     Social History     Socioeconomic History    Marital status:    Tobacco Use    Smoking status: Never    Smokeless tobacco: Former     Types: Snuff   Vaping Use    Vaping status: Never Used   Substance and  Sexual Activity    Alcohol use: Yes     Comment: occasionally    Drug use: No    Sexual activity: Defer     Partners: Female     Birth control/protection: None       Allergies  Patient has no known allergies.    Current Medication List    Current Outpatient Medications:     aspirin 81 MG EC tablet, Take 1 tablet by mouth Daily., Disp: 90 tablet, Rfl: 0    atorvastatin (LIPITOR) 80 MG tablet, Take 1 tablet by mouth Every Night., Disp: 90 tablet, Rfl: 3    cyclobenzaprine (FLEXERIL) 10 MG tablet, Take 1 tablet by mouth 2 (Two) Times a Day As Needed for Muscle Spasms., Disp: 60 tablet, Rfl: 0    metoprolol tartrate (LOPRESSOR) 25 MG tablet, TAKE ONE TABLET BY MOUTH EVERY 12 HOURS, Disp: 180 tablet, Rfl: 1    nitroglycerin (NITROSTAT) 0.4 MG SL tablet, Place 1 tablet under the tongue Every 5 (Five) Minutes As Needed for Chest Pain (Only if SBP Greater Than 100). Take no more than 3 doses in 15 minutes., Disp: 30 tablet, Rfl: 12    Semaglutide-Weight Management (Wegovy) 2.4 MG/0.75ML solution auto-injector, Inject 0.75 mL under the skin into the appropriate area as directed 1 (One) Time Per Week., Disp: 3 mL, Rfl: 0  Medications that contribute to weight gain: None  Drug Interactions  Wegovy+ ASA: Increases risk for hypoglycemia  Weovy + Lopressor: Increases risk for hypoglycemia    Relevant Laboratory Values  Lab Results   Component Value Date    CHOL 85 12/09/2024    TRIG 50 12/09/2024    HDL 51 12/09/2024    LDL 21 12/09/2024     Lab Results   Component Value Date    GLUCOSE 90 12/09/2024    BUN 20 12/09/2024    CREATININE 1.11 12/09/2024    EGFRIFNONA 77 08/21/2020    BCR 18.0 12/09/2024    K 4.2 12/09/2024    CO2 26.0 12/09/2024    CALCIUM 9.2 12/09/2024    ALBUMIN 4.0 12/09/2024    AST 19 12/09/2024    ALT 14 12/09/2024     There is no height or weight on file to calculate BMI.  Lab Results   Component Value Date    TSH 1.780 12/09/2024       Vaccinations:   Patient recommended to keep up with routine vaccinations.  "    Goals of Therapy  Clinical Goals or Therapeutic Targets: Prevent weight associated co-morbidities and complications      Date 8/2/24   8/30/24 9/27/24 10/24/24 11/20/24 12/19/24 1/16/25 2/13/25 3/13/25   Weight (lb) 269.4 lb 261 lb 260.2 lb 255 lb 254.2 lb 254.6 lb 251.2 lb 245.0 lb 247.2 lb   BMI kg/ 35.49 34.43 34.33 33.64 33.54 33.60 33.14 32.33 32.62   Waist Circumference (in)  52.75\" 52\" 51.75\" 51\" 51.5\" 49.5\" 47\" 49\" 48\"     Date 4/10/2025 5/8/25   Weight (lb) 248.6 lb 248.8lb   BMI kg/ 32.81  32.83   Waist Circumference (in)  49.75\" 50\"       Medication Assessment & Plan    Patient has current BMI of 32.83, which is considered Class I Obesity.  Patient did not lose weight this month but congratulated patient on his success thus far!    Will re-order Wegovy 2.4 mg SC weekly.      See initial progress note regarding Keisha Dial's okay for patient to be on medication without being able to complete full screening information.     The following medications will need dose adjustments/closer monitoring once the GLP1 is started: NA      Discussed lifestyle modifications, including diet and exercise.  Patient education provided.     Worked with patient to create SMART Goal(s):   Maintain water intake of at least 64 oz per day  Increase exercise       Previously discussed with patient:     Aim for protein in take of 1.5g/kg/day    Strength Training: Target all major muscle groups, 8-12 repetitions for each exercise and at least 2 x a week on non-consecutive days. With a total weekly target of 60 minutes/week.     Patient was counseled that they will need to discuss holding medication 2-4 weeks prior to colonoscopies or surgical procedures with their surgery physician due to risk of aspiration and/or incomplete bowel prep.      Will follow-up with patient in clinic in 4 weeks.     Maribel Meza, PharmD  5/8/2025  08:05 EDT  "

## 2025-05-12 ENCOUNTER — TELEPHONE (OUTPATIENT)
Dept: FAMILY MEDICINE CLINIC | Facility: CLINIC | Age: 65
End: 2025-05-12
Payer: MEDICARE

## 2025-05-12 NOTE — TELEPHONE ENCOUNTER
Caller: Monae Denise    Relationship: Emergency Contact    Best call back number: [phone]     Requested Prescriptions: PAIN MEDICATION FOR BACK.    Pharmacy where request should be sent: [facility]      Last office visit with prescribing clinician: 4/15/2025   Last telemedicine visit with prescribing clinician: Visit date not found   Next office visit with prescribing clinician: 7/15/2025     Additional details provided by patient:     Does the patient have less than a 3 day supply:  [x] Yes  [] No    Would you like a call back once the refill request has been completed: [] Yes [x] No    If the office needs to give you a call back, can they leave a voicemail: [] Yes [x] No    Shravan Sims Rep   05/12/25 10:22 EDT

## 2025-05-15 ENCOUNTER — LAB (OUTPATIENT)
Dept: FAMILY MEDICINE CLINIC | Facility: CLINIC | Age: 65
End: 2025-05-15
Payer: MEDICARE

## 2025-05-15 ENCOUNTER — OFFICE VISIT (OUTPATIENT)
Dept: FAMILY MEDICINE CLINIC | Facility: CLINIC | Age: 65
End: 2025-05-15
Payer: MEDICARE

## 2025-05-15 VITALS
TEMPERATURE: 97.3 F | HEART RATE: 81 BPM | BODY MASS INDEX: 32.92 KG/M2 | HEIGHT: 73 IN | WEIGHT: 248.4 LBS | SYSTOLIC BLOOD PRESSURE: 130 MMHG | OXYGEN SATURATION: 98 % | DIASTOLIC BLOOD PRESSURE: 74 MMHG

## 2025-05-15 DIAGNOSIS — I25.10 CORONARY ARTERY DISEASE INVOLVING NATIVE CORONARY ARTERY OF NATIVE HEART WITHOUT ANGINA PECTORIS: Chronic | ICD-10-CM

## 2025-05-15 DIAGNOSIS — M25.551 PAIN OF RIGHT HIP: Primary | ICD-10-CM

## 2025-05-15 DIAGNOSIS — R73.01 IMPAIRED FASTING GLUCOSE: ICD-10-CM

## 2025-05-15 DIAGNOSIS — E78.2 MIXED HYPERLIPIDEMIA: ICD-10-CM

## 2025-05-15 LAB
ALBUMIN SERPL-MCNC: 4 G/DL (ref 3.5–5.2)
ALBUMIN/GLOB SERPL: 1.3 G/DL
ALP SERPL-CCNC: 97 U/L (ref 39–117)
ALT SERPL W P-5'-P-CCNC: 15 U/L (ref 1–41)
ANION GAP SERPL CALCULATED.3IONS-SCNC: 8.7 MMOL/L (ref 5–15)
AST SERPL-CCNC: 23 U/L (ref 1–40)
BASOPHILS # BLD AUTO: 0.03 10*3/MM3 (ref 0–0.2)
BASOPHILS NFR BLD AUTO: 0.6 % (ref 0–1.5)
BILIRUB SERPL-MCNC: 0.5 MG/DL (ref 0–1.2)
BUN SERPL-MCNC: 18 MG/DL (ref 8–23)
BUN/CREAT SERPL: 15.3 (ref 7–25)
CALCIUM SPEC-SCNC: 9.3 MG/DL (ref 8.6–10.5)
CHLORIDE SERPL-SCNC: 104 MMOL/L (ref 98–107)
CHOLEST SERPL-MCNC: 76 MG/DL (ref 0–200)
CO2 SERPL-SCNC: 26.3 MMOL/L (ref 22–29)
CREAT SERPL-MCNC: 1.18 MG/DL (ref 0.76–1.27)
DEPRECATED RDW RBC AUTO: 39.4 FL (ref 37–54)
EGFRCR SERPLBLD CKD-EPI 2021: 68.5 ML/MIN/1.73
EOSINOPHIL # BLD AUTO: 0.1 10*3/MM3 (ref 0–0.4)
EOSINOPHIL NFR BLD AUTO: 1.9 % (ref 0.3–6.2)
ERYTHROCYTE [DISTWIDTH] IN BLOOD BY AUTOMATED COUNT: 12.4 % (ref 12.3–15.4)
GLOBULIN UR ELPH-MCNC: 3 GM/DL
GLUCOSE SERPL-MCNC: 55 MG/DL (ref 65–99)
HBA1C MFR BLD: 5.8 % (ref 4.8–5.6)
HCT VFR BLD AUTO: 41.3 % (ref 37.5–51)
HDLC SERPL-MCNC: 51 MG/DL (ref 40–60)
HGB BLD-MCNC: 13.8 G/DL (ref 13–17.7)
IMM GRANULOCYTES # BLD AUTO: 0.01 10*3/MM3 (ref 0–0.05)
IMM GRANULOCYTES NFR BLD AUTO: 0.2 % (ref 0–0.5)
LDLC SERPL CALC-MCNC: 13 MG/DL (ref 0–100)
LDLC/HDLC SERPL: 0.34 {RATIO}
LYMPHOCYTES # BLD AUTO: 1.93 10*3/MM3 (ref 0.7–3.1)
LYMPHOCYTES NFR BLD AUTO: 37.2 % (ref 19.6–45.3)
MCH RBC QN AUTO: 29.4 PG (ref 26.6–33)
MCHC RBC AUTO-ENTMCNC: 33.4 G/DL (ref 31.5–35.7)
MCV RBC AUTO: 88.1 FL (ref 79–97)
MONOCYTES # BLD AUTO: 0.39 10*3/MM3 (ref 0.1–0.9)
MONOCYTES NFR BLD AUTO: 7.5 % (ref 5–12)
NEUTROPHILS NFR BLD AUTO: 2.73 10*3/MM3 (ref 1.7–7)
NEUTROPHILS NFR BLD AUTO: 52.6 % (ref 42.7–76)
NRBC BLD AUTO-RTO: 0 /100 WBC (ref 0–0.2)
PLATELET # BLD AUTO: 218 10*3/MM3 (ref 140–450)
PMV BLD AUTO: 9.6 FL (ref 6–12)
POTASSIUM SERPL-SCNC: 4.3 MMOL/L (ref 3.5–5.2)
PROT SERPL-MCNC: 7 G/DL (ref 6–8.5)
RBC # BLD AUTO: 4.69 10*6/MM3 (ref 4.14–5.8)
SODIUM SERPL-SCNC: 139 MMOL/L (ref 136–145)
T4 FREE SERPL-MCNC: 1.19 NG/DL (ref 0.92–1.68)
TRIGL SERPL-MCNC: 39 MG/DL (ref 0–150)
TSH SERPL DL<=0.05 MIU/L-ACNC: 1.36 UIU/ML (ref 0.27–4.2)
VLDLC SERPL-MCNC: 12 MG/DL (ref 5–40)
WBC NRBC COR # BLD AUTO: 5.19 10*3/MM3 (ref 3.4–10.8)

## 2025-05-15 PROCEDURE — 80061 LIPID PANEL: CPT | Performed by: FAMILY MEDICINE

## 2025-05-15 PROCEDURE — 84443 ASSAY THYROID STIM HORMONE: CPT | Performed by: FAMILY MEDICINE

## 2025-05-15 PROCEDURE — 80053 COMPREHEN METABOLIC PANEL: CPT | Performed by: FAMILY MEDICINE

## 2025-05-15 PROCEDURE — 84439 ASSAY OF FREE THYROXINE: CPT | Performed by: FAMILY MEDICINE

## 2025-05-15 PROCEDURE — 83036 HEMOGLOBIN GLYCOSYLATED A1C: CPT | Performed by: FAMILY MEDICINE

## 2025-05-15 PROCEDURE — 85025 COMPLETE CBC W/AUTO DIFF WBC: CPT | Performed by: FAMILY MEDICINE

## 2025-05-15 PROCEDURE — 36415 COLL VENOUS BLD VENIPUNCTURE: CPT

## 2025-05-15 RX ORDER — DEXAMETHASONE SODIUM PHOSPHATE 4 MG/ML
4 INJECTION, SOLUTION INTRA-ARTICULAR; INTRALESIONAL; INTRAMUSCULAR; INTRAVENOUS; SOFT TISSUE ONCE
Status: COMPLETED | OUTPATIENT
Start: 2025-05-15 | End: 2025-05-15

## 2025-05-15 RX ORDER — TERBINAFINE HYDROCHLORIDE 250 MG/1
250 TABLET ORAL DAILY
Qty: 90 TABLET | Refills: 0 | Status: SHIPPED | OUTPATIENT
Start: 2025-05-15 | End: 2025-08-13

## 2025-05-15 RX ADMIN — DEXAMETHASONE SODIUM PHOSPHATE 4 MG: 4 INJECTION, SOLUTION INTRA-ARTICULAR; INTRALESIONAL; INTRAMUSCULAR; INTRAVENOUS; SOFT TISSUE at 11:18

## 2025-05-15 NOTE — PROGRESS NOTES
"Chief Complaint  Back Pain    Subjective          Delmar Denise Sr. presents to [facility]  Back Pain    History of Present Illness  The patient is a 64-year-old male who presents for evaluation of back pain and nail fungus.    He reports experiencing hip pain, which he attributes to his active lifestyle, including bowling. The onset of this pain was approximately 1 year ago, with the most recent episode occurring about a month ago. He has been managing the pain with muscle relaxants, but these have proven ineffective. He has previously received steroid injections for his back pain, which provided significant relief. However, it has been a long while since his last injection. His sleep has been disrupted due to the severity of the pain.    Additionally, he reports a fungal infection in his toenails, which he has attempted to treat with over-the-counter medications without success. The infection initially presented in the big toe of his left foot following a heart attack and has since spread to other toes and his right foot.    Review of Systems   Musculoskeletal:  Positive for back pain.         Objective   Vital Signs:   /74 (BP Location: Right arm, Patient Position: Sitting, Cuff Size: Large Adult)   Pulse 81   Temp 97.3 °F (36.3 °C) (Temporal)   Ht 185.4 cm (72.99\")   Wt 113 kg (248 lb 6.4 oz)   SpO2 98%   BMI 32.78 kg/m²     Physical Exam      Physical Exam  Constitutional:       General: He is not in acute distress.     Appearance: Normal appearance. He is well-developed and well-groomed. He is not ill-appearing, toxic-appearing or diaphoretic.   HENT:      Head: Normocephalic.      Nose: Nose normal. No congestion or rhinorrhea.      Mouth/Throat:      Mouth: Mucous membranes are moist.      Pharynx: Oropharynx is clear. No oropharyngeal exudate or posterior oropharyngeal erythema.   Eyes:      General: Lids are normal.         Right eye: No discharge.         Left " eye: No discharge.      Extraocular Movements: Extraocular movements intact.      Pupils: Pupils are equal, round, and reactive to light.   Neck:      Vascular: No carotid bruit.   Cardiovascular:      Rate and Rhythm: Normal rate and regular rhythm.      Pulses: Normal pulses.      Heart sounds: Normal heart sounds. No murmur heard.     No friction rub. No gallop.   Pulmonary:      Effort: Pulmonary effort is normal. No respiratory distress.      Breath sounds: Normal breath sounds. No stridor. No wheezing, rhonchi or rales.   Chest:      Chest wall: No tenderness.   Abdominal:      General: Bowel sounds are normal. There is no distension.      Palpations: Abdomen is soft. There is no mass.      Tenderness: There is no abdominal tenderness. There is no right CVA tenderness, left CVA tenderness, guarding or rebound.      Hernia: No hernia is present.   Musculoskeletal:         General: No swelling or tenderness. Normal range of motion.      Cervical back: Normal range of motion and neck supple. No rigidity or tenderness.      Right lower leg: No edema.      Left lower leg: No edema.   Lymphadenopathy:      Cervical: No cervical adenopathy.   Skin:     General: Skin is warm.      Capillary Refill: Capillary refill takes less than 2 seconds.      Coloration: Skin is not jaundiced.      Findings: No bruising, erythema or rash.   Neurological:      General: No focal deficit present.      Mental Status: He is alert and oriented to person, place, and time.      Motor: Motor function is intact. No weakness.      Coordination: Coordination is intact.      Gait: Gait is intact. Gait normal.   Psychiatric:         Attention and Perception: Attention normal.         Mood and Affect: Mood normal.         Speech: Speech normal.         Behavior: Behavior normal.         Cognition and Memory: Cognition normal.         Judgment: Judgment normal.        Result Review :          Results                Assessment and Plan     Diagnoses and all orders for this visit:    1. Pain of right hip (Primary)  -     dexAMETHasone (DECADRON) injection 4 mg  -     Ambulatory Referral to Pain Management    2. Mixed hyperlipidemia  -     CBC Auto Differential; Future  -     Comprehensive Metabolic Panel; Future  -     Lipid Panel; Future  -     T4, Free; Future  -     TSH; Future    3. Coronary artery disease involving native coronary artery of native heart without angina pectoris s/p RCA PCI in 6/2022     4. Impaired fasting glucose  -     Hemoglobin A1c; Future    Other orders  -     terbinafine (lamiSIL) 250 MG tablet; Take 1 tablet by mouth Daily for 90 days.  Dispense: 90 tablet; Refill: 0      Assessment & Plan  1. Back pain.  - Reports persistent back pain for about a year, with increased severity over the past month.  - Muscle relaxers have not been effective.  - Referral to Dr. Lopez for further evaluation and potential treatment will be initiated.  - Prescription for a topical analgesic cream containing gabapentin, numbing agents, and NSAIDs will be sent to pharmacy. He will also receive a steroid injection today to help alleviate the pain.    2. Nail fungus.  - Experiencing nail fungus on his toes, which started after a heart attack and has spread to both feet.  - Over-the-counter treatments have been ineffective.  - Laboratory tests will be conducted to assess liver function before starting oral antifungal therapy, as the medication is metabolized by the liver.  - Prescription for an oral antifungal medication will be provided, to be taken once daily for 3 months.    Patient's Body mass index is 32.78 kg/m². indicating that he is obese (BMI >30). Obesity-related health conditions include the following: hypertension and dyslipidemias. Obesity is unchanged. BMI is is above average; BMI management plan is completed. We discussed low calorie, low carb based diet program, portion control, and increasing exercise..    Follow Up   Return labs  today.  Patient was given instructions and counseling regarding his condition or for health maintenance advice. Please see specific information pulled into the AVS if appropriate.     This document has been electronically signed by MATTHEW Gaston  May 15, 2025 12:37 EDT     Patient or patient representative verbalized consent for the use of Ambient Listening during the visit with  MATTHEW Gaston for chart documentation. 5/15/2025  12:38 EDT

## 2025-05-20 ENCOUNTER — RESULTS FOLLOW-UP (OUTPATIENT)
Dept: FAMILY MEDICINE CLINIC | Facility: CLINIC | Age: 65
End: 2025-05-20
Payer: MEDICARE

## 2025-05-24 DIAGNOSIS — M25.551 PAIN OF RIGHT HIP: ICD-10-CM

## 2025-05-27 RX ORDER — CYCLOBENZAPRINE HCL 10 MG
10 TABLET ORAL 2 TIMES DAILY PRN
Qty: 60 TABLET | Refills: 0 | Status: SHIPPED | OUTPATIENT
Start: 2025-05-27

## 2025-06-05 ENCOUNTER — DISEASE STATE MANAGEMENT VISIT (OUTPATIENT)
Dept: PHARMACY | Facility: HOSPITAL | Age: 65
End: 2025-06-05
Payer: MEDICARE

## 2025-06-05 VITALS
SYSTOLIC BLOOD PRESSURE: 134 MMHG | DIASTOLIC BLOOD PRESSURE: 83 MMHG | HEART RATE: 82 BPM | BODY MASS INDEX: 31.91 KG/M2 | HEIGHT: 73 IN | WEIGHT: 240.8 LBS

## 2025-06-05 RX ORDER — SEMAGLUTIDE 2.4 MG/.75ML
2.4 INJECTION, SOLUTION SUBCUTANEOUS WEEKLY
Qty: 3 ML | Refills: 0 | Status: SHIPPED | OUTPATIENT
Start: 2025-06-05

## 2025-06-05 NOTE — PROGRESS NOTES
[facility]  Weight Management Program      Delmar Denise Sr. is a 64 y.o. male referred to the [facility] by Keisha Dial for clinical pharmacy and specialty pharmacy management of GLP1 for weight management/ASCVD.  The patient denies a personal history of thyroid cancer and denies a personal history of pancreatitis.     Unfortunately, patient is unaware of family history as he reports he was adopted. Screening for GLP-1 use pertinent to family history cannot be completed.     Delmar Denise Sr. has previously tried calorie counting and lifestyle changes for weight loss. Patient just had hip surgery 1/28/25. Patient reports he has not had any problems post-surgery.     Patient is currently on Wegovy 2.4 mg weekly. Patient denies any lumps/swelling/hoarseness in neck/throat or abdominal pain. Patient reports tolerating medication well without any side effects. Patient denies any trouble giving injection or any missed doses.     Patient reports that he has been doing a lot of work outside, more than ever before and he is considering that his exercise, as he keeps his body moving. He reports meeting his water intake goal and no longer eats anything at night, as he is not hungry then. He reports his last time eating is around 6-7pm.     He is excited about his weight loss this month, as he has been stagnant for the past few months.     Relevant Past Medical History and Co-morbidities  Past Medical History:   Diagnosis Date    Arthritis     Back pain     Coronary artery disease involving native coronary artery of native heart without angina pectoris s/p RCA PCI in 6/2022 08/06/2022    Degenerative disc disease, lumbar     Mixed hyperlipidemia 08/06/2022    NSTEMI (non-ST elevated myocardial infarction) 06/29/2022     Social History     Socioeconomic History    Marital status:    Tobacco Use    Smoking status: Never    Smokeless tobacco: Former     Types: Snuff   Vaping Use     Vaping status: Never Used   Substance and Sexual Activity    Alcohol use: Yes     Comment: occasionally    Drug use: No    Sexual activity: Defer     Partners: Female     Birth control/protection: None       Allergies  Patient has no known allergies.    Current Medication List    Current Outpatient Medications:     aspirin 81 MG EC tablet, Take 1 tablet by mouth Daily., Disp: 90 tablet, Rfl: 0    atorvastatin (LIPITOR) 80 MG tablet, Take 1 tablet by mouth Every Night., Disp: 90 tablet, Rfl: 3    cyclobenzaprine (FLEXERIL) 10 MG tablet, TAKE 1 TABLET BY MOUTH 2 TIMES A DAY AS NEEDED FOR MUSCLE SPASMS, Disp: 60 tablet, Rfl: 0    metoprolol tartrate (LOPRESSOR) 25 MG tablet, TAKE ONE TABLET BY MOUTH EVERY 12 HOURS, Disp: 180 tablet, Rfl: 1    nitroglycerin (NITROSTAT) 0.4 MG SL tablet, Place 1 tablet under the tongue Every 5 (Five) Minutes As Needed for Chest Pain (Only if SBP Greater Than 100). Take no more than 3 doses in 15 minutes., Disp: 30 tablet, Rfl: 12    Semaglutide-Weight Management (Wegovy) 2.4 MG/0.75ML solution auto-injector, Inject 0.75 mL under the skin into the appropriate area as directed 1 (One) Time Per Week., Disp: 3 mL, Rfl: 0    terbinafine (lamiSIL) 250 MG tablet, Take 1 tablet by mouth Daily for 90 days., Disp: 90 tablet, Rfl: 0  Medications that contribute to weight gain: None  Drug Interactions  Wegovy+ ASA: Increases risk for hypoglycemia  Weovy + Lopressor: Increases risk for hypoglycemia    Relevant Laboratory Values  Lab Results   Component Value Date    CHOL 76 05/15/2025    TRIG 39 05/15/2025    HDL 51 05/15/2025    LDL 13 05/15/2025     Lab Results   Component Value Date    GLUCOSE 55 (L) 05/15/2025    BUN 18 05/15/2025    CREATININE 1.18 05/15/2025    EGFRIFNONA 77 08/21/2020    BCR 15.3 05/15/2025    K 4.3 05/15/2025    CO2 26.3 05/15/2025    CALCIUM 9.3 05/15/2025    ALBUMIN 4.0 05/15/2025    AST 23 05/15/2025    ALT 15 05/15/2025     There is no height or weight on file to  "calculate BMI.  Lab Results   Component Value Date    TSH 1.360 05/15/2025       Vaccinations:   Patient recommended to keep up with routine vaccinations.     Goals of Therapy  Clinical Goals or Therapeutic Targets: Prevent weight associated co-morbidities and complications      Date 8/2/24   8/30/24 9/27/24 10/24/24 11/20/24 12/19/24 1/16/25 2/13/25 3/13/25   Weight (lb) 269.4 lb 261 lb 260.2 lb 255 lb 254.2 lb 254.6 lb 251.2 lb 245.0 lb 247.2 lb   BMI kg/ 35.49 34.43 34.33 33.64 33.54 33.60 33.14 32.33 32.62   Waist Circumference (in)  52.75\" 52\" 51.75\" 51\" 51.5\" 49.5\" 47\" 49\" 48\"     Date 4/10/2025 5/8/25 6/5/25   Weight (lb) 248.6 lb 248.8lb 240.8lb   BMI kg/ 32.81  32.83 31.78   Waist Circumference (in)  49.75\" 50\" 49\"       Medication Assessment & Plan    Patient has current BMI of 31.78, which is considered Class I Obesity.  Patient has lost 28.6lbs overall and 8lbs this past month. Patient was congratulated on his success thus far!     Will re-order Wegovy 2.4 mg SC weekly.  Encouraged the patient to keep his body moving as he has this past month.     See initial progress note regarding Keisha Dial's okay for patient to be on medication without being able to complete full screening information.     The following medications will need dose adjustments/closer monitoring once the GLP1 is started: NA    Patient had labs on 5/15/25 that were reviewed by his PCP. Blood sugar was low at 55, but patient denies any lightheadedness at that time or at any time. Patient denies any symptoms of low blood sugar- patient was educated on them and told what to do if they occur.     Discussed lifestyle modifications, including diet and exercise.  Patient education provided.     Worked with patient to create SMART Goal(s):   Maintain water intake of at least 64 oz per day  Increase exercise   Cut out all late night eating    Previously discussed with patient:     Aim for protein in take of 1.5g/kg/day    Strength Training: " Target all major muscle groups, 8-12 repetitions for each exercise and at least 2 x a week on non-consecutive days. With a total weekly target of 60 minutes/week.     Patient was counseled that they will need to discuss holding medication 2-4 weeks prior to colonoscopies or surgical procedures with their surgery physician due to risk of aspiration and/or incomplete bowel prep.      Will follow-up with patient in clinic in 4 weeks.     Maribel Meza, PharmD  6/5/2025  08:31 EDT

## 2025-06-12 ENCOUNTER — OFFICE VISIT (OUTPATIENT)
Dept: CARDIOLOGY | Facility: CLINIC | Age: 65
End: 2025-06-12
Payer: MEDICARE

## 2025-06-12 VITALS
WEIGHT: 248 LBS | DIASTOLIC BLOOD PRESSURE: 79 MMHG | SYSTOLIC BLOOD PRESSURE: 132 MMHG | BODY MASS INDEX: 32.87 KG/M2 | OXYGEN SATURATION: 97 % | HEIGHT: 73 IN | HEART RATE: 76 BPM

## 2025-06-12 DIAGNOSIS — E66.811 CLASS 1 OBESITY WITH ALVEOLAR HYPOVENTILATION, SERIOUS COMORBIDITY, AND BODY MASS INDEX (BMI) OF 32.0 TO 32.9 IN ADULT: ICD-10-CM

## 2025-06-12 DIAGNOSIS — I25.10 CORONARY ARTERY DISEASE INVOLVING NATIVE CORONARY ARTERY OF NATIVE HEART WITHOUT ANGINA PECTORIS: Primary | Chronic | ICD-10-CM

## 2025-06-12 DIAGNOSIS — I10 PRIMARY HYPERTENSION: Chronic | ICD-10-CM

## 2025-06-12 DIAGNOSIS — E78.2 MIXED HYPERLIPIDEMIA: Chronic | ICD-10-CM

## 2025-06-12 DIAGNOSIS — E66.2 CLASS 1 OBESITY WITH ALVEOLAR HYPOVENTILATION, SERIOUS COMORBIDITY, AND BODY MASS INDEX (BMI) OF 32.0 TO 32.9 IN ADULT: ICD-10-CM

## 2025-06-12 PROBLEM — Z01.818 PRE-OP TESTING: Status: RESOLVED | Noted: 2020-08-20 | Resolved: 2025-06-12

## 2025-06-12 PROCEDURE — 93000 ELECTROCARDIOGRAM COMPLETE: CPT | Performed by: NURSE PRACTITIONER

## 2025-06-12 PROCEDURE — 99214 OFFICE O/P EST MOD 30 MIN: CPT | Performed by: NURSE PRACTITIONER

## 2025-06-12 PROCEDURE — 1159F MED LIST DOCD IN RCRD: CPT | Performed by: NURSE PRACTITIONER

## 2025-06-12 PROCEDURE — 3075F SYST BP GE 130 - 139MM HG: CPT | Performed by: NURSE PRACTITIONER

## 2025-06-12 PROCEDURE — 3078F DIAST BP <80 MM HG: CPT | Performed by: NURSE PRACTITIONER

## 2025-06-12 PROCEDURE — 1160F RVW MEDS BY RX/DR IN RCRD: CPT | Performed by: NURSE PRACTITIONER

## 2025-06-12 RX ORDER — NITROGLYCERIN 0.4 MG/1
0.4 TABLET SUBLINGUAL
Qty: 25 TABLET | Refills: 0 | Status: SHIPPED | OUTPATIENT
Start: 2025-06-12

## 2025-06-12 NOTE — PROGRESS NOTES
"Chief Complaint  Follow-up (Patient denies chest pain or SOA, states doing well )    Subjective          Delmar Denise Sr. presents to [facility] CARDIOLOGY for follow up.    History of Present Illness    Mr. Denise presents for follow-up of coronary artery disease, hyperlipidemia, and hypertension.  His last visit to clinic was 2024 with me.  At that time he was seeking preoperative clearance for sacroiliac joint hardware fixation.  He experienced influenza and COVID-19 between  and New Year's, which delayed his hip procedure until the fall.  History of Present Illness  He reports a general sense of well-being, with no complaints of chest pain or shortness of breath. His nitroglycerin supply has  without being used. He does not require refills for Lipitor or metoprolol at this time, as he recently obtained them.  His cholesterol and blood pressure are at goal at current medications and he is not experiencing any troublesome side effects.    He has been on a regimen of Wegovy which has resulted in a weight loss of 29 pounds. He reports good tolerance to the medication.    He has been taking terbinafine daily for the past month, with a treatment plan extending to 90 days. This is in response to a toenail condition that has proven resistant to other treatments.           Tobacco Use: Medium Risk (2025)    Patient History     Smoking Tobacco Use: Never     Smokeless Tobacco Use: Former     Passive Exposure: Not on file       Objective     Vital Signs:   /79 (BP Location: Left arm, Patient Position: Sitting, Cuff Size: Adult)   Pulse 76   Ht 185.4 cm (73\")   Wt 112 kg (248 lb)   SpO2 97%   BMI 32.72 kg/m²       Physical Exam  Vitals and nursing note reviewed. Exam conducted with a chaperone present (Wife accompanies).   Constitutional:       General: He is not in acute distress.     Appearance: He is obese.   HENT:      Head: Normocephalic and atraumatic.   Eyes:      " Conjunctiva/sclera: Conjunctivae normal.   Neck:      Vascular: No carotid bruit.   Cardiovascular:      Rate and Rhythm: Normal rate and regular rhythm.      Pulses: Normal pulses.   Pulmonary:      Effort: Pulmonary effort is normal.      Breath sounds: Normal breath sounds.   Musculoskeletal:      Cervical back: Neck supple.      Right lower leg: No edema.      Left lower leg: No edema.   Skin:     General: Skin is warm and dry.   Neurological:      General: No focal deficit present.      Mental Status: He is alert.   Psychiatric:         Mood and Affect: Mood normal.         Behavior: Behavior normal.             Result Review :            The following data was reviewed by me 06/12/25 at 08:01 EDT: cardiac and lab studies as detailed below.    WBC   Date Value Ref Range Status   05/15/2025 5.19 3.40 - 10.80 10*3/mm3 Final     Hemoglobin   Date Value Ref Range Status   05/15/2025 13.8 13.0 - 17.7 g/dL Final     Hematocrit   Date Value Ref Range Status   05/15/2025 41.3 37.5 - 51.0 % Final     MCV   Date Value Ref Range Status   05/15/2025 88.1 79.0 - 97.0 fL Final     MCH   Date Value Ref Range Status   05/15/2025 29.4 26.6 - 33.0 pg Final     Platelets   Date Value Ref Range Status   05/15/2025 218 140 - 450 10*3/mm3 Final     Glucose   Date Value Ref Range Status   05/15/2025 55 (L) 65 - 99 mg/dL Final     BUN   Date Value Ref Range Status   05/15/2025 18 8 - 23 mg/dL Final     Creatinine   Date Value Ref Range Status   05/15/2025 1.18 0.76 - 1.27 mg/dL Final     Sodium   Date Value Ref Range Status   05/15/2025 139 136 - 145 mmol/L Final     Potassium   Date Value Ref Range Status   05/15/2025 4.3 3.5 - 5.2 mmol/L Final     Chloride   Date Value Ref Range Status   05/15/2025 104 98 - 107 mmol/L Final     CO2   Date Value Ref Range Status   05/15/2025 26.3 22.0 - 29.0 mmol/L Final     Calcium   Date Value Ref Range Status   05/15/2025 9.3 8.6 - 10.5 mg/dL Final     Total Protein   Date Value Ref Range Status    05/15/2025 7.0 6.0 - 8.5 g/dL Final     Albumin   Date Value Ref Range Status   05/15/2025 4.0 3.5 - 5.2 g/dL Final     ALT (SGPT)   Date Value Ref Range Status   05/15/2025 15 1 - 41 U/L Final     AST (SGOT)   Date Value Ref Range Status   05/15/2025 23 1 - 40 U/L Final     Alkaline Phosphatase   Date Value Ref Range Status   05/15/2025 97 39 - 117 U/L Final     Total Bilirubin   Date Value Ref Range Status   05/15/2025 0.5 0.0 - 1.2 mg/dL Final     Anion Gap   Date Value Ref Range Status   05/15/2025 8.7 5.0 - 15.0 mmol/L Final     eGFR   Date Value Ref Range Status   05/15/2025 68.5 >60.0 mL/min/1.73 Final     Total Cholesterol   Date Value Ref Range Status   05/15/2025 76 0 - 200 mg/dL Final     HDL Cholesterol   Date Value Ref Range Status   05/15/2025 51 40 - 60 mg/dL Final     LDL Cholesterol    Date Value Ref Range Status   05/15/2025 13 0 - 100 mg/dL Final     LDL/HDL Ratio   Date Value Ref Range Status   05/15/2025 0.34  Final     Lipoprotein (a)   Date Value Ref Range Status   04/15/2025 <8.4 <75.0 nmol/L Final     Comment:     **Results verified by repeat testing**  Note:  Values greater than or equal to 75.0 nmol/L may         indicate an independent risk factor for CHD,         but must be evaluated with caution when applied         to non- populations due to the         influence of genetic factors on Lp(a) across         ethnicities.     Hemoglobin A1C   Date Value Ref Range Status   05/15/2025 5.80 (H) 4.80 - 5.60 % Final   12/09/2024 5.60 4.80 - 5.60 % Final   09/21/2023 5.90 (H) 4.80 - 5.60 % Final     Magnesium   Date Value Ref Range Status   09/22/2023 2.1 1.6 - 2.4 mg/dL Final       Last Cardiac Cath  Results for orders placed during the hospital encounter of 06/29/22    Cardiac Catheterization/Vascular Study    Narrative  Images from the original result were not included.  CARDIAC CATHETERIZATION / INTERVENTION REPORT            DATE OF PROCEDURE: 6/29/2022      INDICATION FOR  PROCEDURE: NSTEMI      PRE PROCEDURE DIAGNOSIS:  NSTEMI  HTN    POST PROCEDURE DIAGNOSIS:  CAD proximal % occlusion s/p PCI with 3.5 x 18 MATTHEW post dilated with 4.5 NC balloon at high pressure    Face to face mdoerate conscious  sedation time :      COMPLICATIONS : None    Specimens collected : None    PROCEDURE PERFORMED:    1. Selective right and left Coronary Angiogram  2. Left heart catheretization  3. Left Ventriculography  4. PCI of RCA  5. IVUS of RCA  6.  Manual aspiration thrombectomy of RCA    Description of the procedure:  Prior to the procedure risk, benefits and possible alternative were discussed with the patient and informed consent was obtained. Patient was brought to cardiac cath lab table in post absorbtive state. Patient was prepped and drape in usual sterile fashion. IV Versed and Fentanyl was used for moderate sedation. 2% Lidocaine was used for topical anesthesia. R radial arterial site was prepped and a micropuncture needle was used to access the artery and a 5 F slender sheath was placed. 2.5 mg of Verapamil and 200 mcg of NTG was given through the sheath intra arterial and 5000 units of Heparin was given once the catheter crossed the aortic arch.    5 F TIG 4 catheters was used for right and left coronary angiogram and 5 F pigtail catheter was used for Left heart hemodynamics and left ventriculography. All the catheters were exchanged over 0.035 wire. The R radial arterial sheath was removed and TR band was applied and immediate and complete hemostasis was achieved. The patient tolerate the entire procedure well without any immediate known complications.    Coronary anatomy findings:    LM: Is a large calibre vessel , normal take off from left cusp, divides into LAD and Lcx. No significant stenosis noted    LAD: Large calibre vessel, mid after origin of D1 had mild 40% stenosis, no other significant stenosis noted, D1 patent    LCX: Moderate calibre vessel, mild luminal irregularities.  Large hign OM1 artery with no stenosis, small OM2 artery which had about 40-50% stenosis in proximal portion.    RCA: Large calibre, dominant artery, normal take off from right cusp.  100% occluded in proximal after take of conus branch s/p PCI with LILIAN 3 flow, mid had mild 30-40% stenosis, RPDA and PL branches had no stenosis. .    Left Ventriculography:    LV systolic function was mildly reduced  with visual estimated EF of 45-50% with inferior wall hypokinesia.  No significant mitral regurgitation noted.    LVEDP: 21 mmHg  No gradient across the aortic valve on pull back.      PERCUTANEOUS CORONARY INTERVENTION PROCEDURE NOTE:    The patient did not have any ST elevations on presentation, he already had collaterals supplying the distal RPDA and PL, I did asked the patient regarding symptom onset and he was very confident that his first ever chest pain was last night which is less than 24 hours so I decided to try to wire and do angioplasty to see if occlusion is more of an acute rather than subacute.    Heparin monotherapy was used for anticoagulation.  Total of 10,000 Units was given IV. Pt also received single bolus integrelin    6 F JR 4 guide was used to engage the RCA coaxially.    0.014 Runthrough guidewire was used to cross the lesion and parked distally.  There was no challenges in wiring the artery likely an acute occlusion.    There was significant clot burden even after wiring of the RCA so used [email or web address] LP manual aspiration catheter and significant red thrombi was extracted which actually did establish a good LILIAN-3 flow and 90% stenosis at the plaque rupture site.    Used a 3.0 x 15 compliant TREK balloon to predilate the lesion at 20 linda.    Prepped a 3.5 x 18 Gladis Drug eluting stent and position at the lesion and successfully deployed at 16 linda.  The stent did appear to be undersized for the more distal portion of the stent and the distal reference artery diameter was at least 5 mm from IVUS, I  then used a 4.5 NC balloon to postdilated 20 linda.    Post stent deployment angio pictures showed good expansion with 0% residual stenosis, LILIAN 3 flow in the distal vascular bed and no dissection or distal wire perforation.    Lesion length: 15 mm  Pre PCI Stenosis: 100 %  Post PCI stenosis: 0 %  Pre PCI LILIAN flow: 0  Post PCI LILIAN flow: 3    EBL: Less than 10cc              Recommendations:  Aggressive guideline directed medical management for CAD  Dual Anti platelet medication with Asa 81 mg qd and Brilinta 90 mg bid for minimum 1 year.            Donovan MD [place], [facility]  Interventional Cardiology    06/29/22  12:39 EDT      Last Coronary CTA      Last Stress test  Results for orders placed during the hospital encounter of 09/21/23    Stress Test With Myocardial Perfusion One Day    Interpretation Summary    Myocardial perfusion imaging indicates a small-sized infarct located in the inferior wall with no significant ischemia noted.    Left ventricular ejection fraction is normal (Calculated EF = 60%).    Abnormal LV wall motion consistent with mild hypokineses of the inferior wall.    Impressions are consistent with an intermediate risk study.    Diaphragmatic attenuation artifact is present.    Findings consistent with a normal ECG stress test.       Last Echo  Results for orders placed during the hospital encounter of 09/21/23    Adult Transthoracic Echo Complete w/ Color, Spectral and Contrast if necessary per protocol    Interpretation Summary    Left ventricular systolic function is normal. Left ventricular ejection fraction appears to be 51 - 55%.    Left ventricular diastolic function is consistent with (grade I) impaired relaxation.    There is no evidence of pericardial effusion         ECG 12 Lead    Date/Time: 6/12/2025 10:20 AM  Performed by: Keisha Dial APRN    Authorized by: Keisha Dial APRN  Comparison: compared with previous ECG from 12/12/2024  Similar to previous ECG  Comparison to  previous ECG: Sinus rhythm with LVH and Q waves in lead III and aVF  Rhythm: sinus rhythm  Rate: normal  BPM: 77  QRS axis: left    Clinical impression: non-specific ECG           Current Outpatient Medications   Medication Sig Dispense Refill    aspirin 81 MG EC tablet Take 1 tablet by mouth Daily. 90 tablet 0    atorvastatin (LIPITOR) 80 MG tablet Take 1 tablet by mouth Every Night. 90 tablet 3    cyclobenzaprine (FLEXERIL) 10 MG tablet TAKE 1 TABLET BY MOUTH 2 TIMES A DAY AS NEEDED FOR MUSCLE SPASMS 60 tablet 0    metoprolol tartrate (LOPRESSOR) 25 MG tablet TAKE ONE TABLET BY MOUTH EVERY 12 HOURS 180 tablet 1    nitroglycerin (NITROSTAT) 0.4 MG SL tablet Place 1 tablet under the tongue Every 5 (Five) Minutes As Needed for Chest Pain (Only if SBP Greater Than 100). Take no more than 3 doses in 15 minutes. 25 tablet 0    Semaglutide-Weight Management (Wegovy) 2.4 MG/0.75ML solution auto-injector Inject 0.75 mL under the skin into the appropriate area as directed 1 (One) Time Per Week. 3 mL 0    terbinafine (lamiSIL) 250 MG tablet Take 1 tablet by mouth Daily for 90 days. 90 tablet 0     No current facility-administered medications for this visit.            Assessment and Plan    Diagnoses and all orders for this visit:    1. Coronary artery disease involving native coronary artery of native heart without angina pectoris s/p RCA PCI in 6/2022  (Primary)  Assessment & Plan:  Coronary Artery Disease: Coronary artery disease is stable.  Goals of Care:Medication tolerance and compliance and control progression of disease  Plan: Continue current treatment regimen.  Cardiac status will be reassessed in 6 months.      Orders:  -     nitroglycerin (NITROSTAT) 0.4 MG SL tablet; Place 1 tablet under the tongue Every 5 (Five) Minutes As Needed for Chest Pain (Only if SBP Greater Than 100). Take no more than 3 doses in 15 minutes.  Dispense: 25 tablet; Refill: 0  -     ECG 12 Lead  -     Adult Transthoracic Echo Complete w/  Color, Spectral and Contrast if necessary per protocol; Future    2. Mixed hyperlipidemia  Assessment & Plan:   Dyslipidemia is Controlled  Goals of care: Medication compliance and tolerance, Control progression of disease, and Maintain LDL <70  Plan: Continue current medications  Follow up: in 6 months            3. Primary hypertension  Assessment & Plan:  Hypertension is Controlled  Goals of Care:Medication compliance and tolerance, Systolic blood pressure <130, and Diastolic blood pressure  <80  Plan:  Continue current medications  Follow up:in 6 months        4. Class 1 obesity with alveolar hypoventilation, serious comorbidity, and body mass index (BMI) of 32.0 to 32.9 in adult      Assessment & Plan  1. Cardiac health:  - Cholesterol levels are within the desired range.  - Negative lipoprotein (a) test result.  - Last echocardiogram conducted in 09/2023.  - Prescription for nitroglycerin sent to pharmacy.  - Echocardiogram to be ordered for 09/2025.  - Schedule an appointment within 30 days of surgery for preoperative clearance if needed.    2. Weight management:  - Lost 29 pounds since starting Wegovy.  - Tolerating Wegovy well.  - Discussed potential heart value in Wegovy.        Follow-up:  - Follow up in 6 months.      Follow Up     Return in about 6 months (around 12/12/2025).    Patient was given instructions and counseling regarding his condition or for health maintenance advice. Please see specific information pulled into the AVS if appropriate.       Electronically signed by MATTHEW Amezcua, 06/12/25, 10:24 AM EDT.    Patient or patient representative verbalized consent for the use of Ambient Listening during the visit with  MATTHEW Amezcua for chart documentation. 6/12/2025  10:24 EDT     Dictated Utilizing Dragon Dictation: Part of this note may be an electronic transcription/translation of spoken language to printed text using the Dragon Dictation System

## 2025-06-12 NOTE — ASSESSMENT & PLAN NOTE
Dyslipidemia is Controlled  Goals of care: Medication compliance and tolerance, Control progression of disease, and Maintain LDL <70  Plan: Continue current medications  Follow up: in 6 months

## 2025-07-03 ENCOUNTER — DISEASE STATE MANAGEMENT VISIT (OUTPATIENT)
Dept: PHARMACY | Facility: HOSPITAL | Age: 65
End: 2025-07-03
Payer: MEDICARE

## 2025-07-03 VITALS
HEART RATE: 78 BPM | WEIGHT: 238.6 LBS | DIASTOLIC BLOOD PRESSURE: 85 MMHG | HEIGHT: 73 IN | SYSTOLIC BLOOD PRESSURE: 134 MMHG | BODY MASS INDEX: 31.62 KG/M2

## 2025-07-03 RX ORDER — SEMAGLUTIDE 2.4 MG/.75ML
2.4 INJECTION, SOLUTION SUBCUTANEOUS WEEKLY
Qty: 3 ML | Refills: 0 | Status: SHIPPED | OUTPATIENT
Start: 2025-07-03

## 2025-07-03 NOTE — PROGRESS NOTES
[facility]  Weight Management Program      Delmar Denise Sr. is a 65 y.o. male referred to the [facility]ication Management Clinic by Keisha Dial for clinical pharmacy and specialty pharmacy management of GLP1 for weight management/ASCVD.  The patient denies a personal history of thyroid cancer and denies a personal history of pancreatitis.     Unfortunately, patient is unaware of family history as he reports he was adopted. Screening for GLP-1 use pertinent to family history cannot be completed.     Delmar Denise Sr. has previously tried calorie counting and lifestyle changes for weight loss. Current weight loss efforts include Wegovy.    Patient is currently on Wegovy 2.4 mg weekly. Patient denies any lumps/swelling/hoarseness in neck/throat or abdominal pain. Patient reports tolerating medication well without any side effects. Patient denies any trouble giving injection or any missed doses.     Patient reports that he has been doing a lot of work outside and has been more active than normal. He reports meeting his water intake goal and no longer eats anything at night, as he is not hungry then. He reports that he doesn't eat later than 6-7pm now.                                   Relevant Past Medical History and Co-morbidities  Past Medical History:   Diagnosis Date    Arthritis     Back pain     Coronary artery disease involving native coronary artery of native heart without angina pectoris s/p RCA PCI in 6/2022 08/06/2022    Degenerative disc disease, lumbar     Mixed hyperlipidemia 08/06/2022    NSTEMI (non-ST elevated myocardial infarction) 06/29/2022     Social History     Socioeconomic History    Marital status:    Tobacco Use    Smoking status: Never    Smokeless tobacco: Former     Types: Snuff   Vaping Use    Vaping status: Never Used   Substance and Sexual Activity    Alcohol use: Yes     Comment: occasionally    Drug use: No    Sexual activity: Defer     Partners: Female      Birth control/protection: None       Allergies  Patient has no known allergies.    Current Medication List    Current Outpatient Medications:     aspirin 81 MG EC tablet, Take 1 tablet by mouth Daily., Disp: 90 tablet, Rfl: 0    atorvastatin (LIPITOR) 80 MG tablet, Take 1 tablet by mouth Every Night., Disp: 90 tablet, Rfl: 3    cyclobenzaprine (FLEXERIL) 10 MG tablet, TAKE 1 TABLET BY MOUTH 2 TIMES A DAY AS NEEDED FOR MUSCLE SPASMS, Disp: 60 tablet, Rfl: 0    metoprolol tartrate (LOPRESSOR) 25 MG tablet, TAKE ONE TABLET BY MOUTH EVERY 12 HOURS, Disp: 180 tablet, Rfl: 1    nitroglycerin (NITROSTAT) 0.4 MG SL tablet, Place 1 tablet under the tongue Every 5 (Five) Minutes As Needed for Chest Pain (Only if SBP Greater Than 100). Take no more than 3 doses in 15 minutes., Disp: 25 tablet, Rfl: 0    Semaglutide-Weight Management (Wegovy) 2.4 MG/0.75ML solution auto-injector, Inject 0.75 mL under the skin into the appropriate area as directed 1 (One) Time Per Week., Disp: 3 mL, Rfl: 0    terbinafine (lamiSIL) 250 MG tablet, Take 1 tablet by mouth Daily for 90 days., Disp: 90 tablet, Rfl: 0  Medications that contribute to weight gain: None  Drug Interactions  Wegovy+ ASA: Increases risk for hypoglycemia  Weovy + Lopressor: Increases risk for hypoglycemia    Relevant Laboratory Values  Lab Results   Component Value Date    CHOL 76 05/15/2025    TRIG 39 05/15/2025    HDL 51 05/15/2025    LDL 13 05/15/2025     Lab Results   Component Value Date    GLUCOSE 55 (L) 05/15/2025    BUN 18 05/15/2025    CREATININE 1.18 05/15/2025    EGFRIFNONA 77 08/21/2020    BCR 15.3 05/15/2025    K 4.3 05/15/2025    CO2 26.3 05/15/2025    CALCIUM 9.3 05/15/2025    ALBUMIN 4.0 05/15/2025    AST 23 05/15/2025    ALT 15 05/15/2025     There is no height or weight on file to calculate BMI.  Lab Results   Component Value Date    TSH 1.360 05/15/2025       Vaccinations:   Patient recommended to keep up with routine vaccinations.     Goals of  "Therapy  Clinical Goals or Therapeutic Targets: Prevent weight associated co-morbidities and complications      Date 8/2/24   8/30/24 9/27/24 10/24/24 11/20/24 12/19/24 1/16/25 2/13/25 3/13/25   Weight (lb) 269.4 lb 261 lb 260.2 lb 255 lb 254.2 lb 254.6 lb 251.2 lb 245.0 lb 247.2 lb   BMI kg/ 35.49 34.43 34.33 33.64 33.54 33.60 33.14 32.33 32.62   Waist Circumference (in)  52.75\" 52\" 51.75\" 51\" 51.5\" 49.5\" 47\" 49\" 48\"     Date 4/10/2025 5/8/25 6/5/25 7/3/25   Weight (lb) 248.6 lb 248.8lb 240.8lb 238.6lb   BMI kg/ 32.81  32.83 31.78 31.48   Waist Circumference (in)  49.75\" 50\" 49\" 48.75\"       Medication Assessment & Plan    Patient has current BMI of 31.48, which is considered Class I Obesity.  Patient has lost 30.8 lbs overall. Patient was congratulated on his success thus far!     Will re-order Wegovy 2.4 mg SC weekly.  Encouraged the patient to keep his body moving as he has this past month.     See initial progress note regarding Keisha Jerniganer's okay for patient to be on medication without being able to complete full screening information.     The following medications will need dose adjustments/closer monitoring once the GLP1 is started: NA    Patient had labs on 5/15/25 that were reviewed by his PCP. Blood sugar was low at 55, but patient denies any lightheadedness at that time or at any time. Patient denies any symptoms of low blood sugar- patient was educated on them and told what to do if they occur.     Discussed lifestyle modifications, including diet and exercise.  Patient education provided.     Worked with patient to create SMART Goal(s):   Maintain water intake of at least 64 oz per day  Increase exercise   Cut out all late night eating    Previously discussed with patient:     Aim for protein in take of 1.5g/kg/day    Strength Training: Target all major muscle groups, 8-12 repetitions for each exercise and at least 2 x a week on non-consecutive days. With a total weekly target of 60 minutes/week. "     Patient was counseled that they will need to discuss holding medication 2-4 weeks prior to colonoscopies or surgical procedures with their surgery physician due to risk of aspiration and/or incomplete bowel prep.      Will follow-up with patient in clinic in 4 weeks.     Violeta Forde, PharmD  7/3/2025  08:30 EDT

## 2025-07-08 DIAGNOSIS — M25.551 PAIN OF RIGHT HIP: ICD-10-CM

## 2025-07-08 RX ORDER — CYCLOBENZAPRINE HCL 10 MG
10 TABLET ORAL 2 TIMES DAILY PRN
Qty: 60 TABLET | Refills: 0 | Status: SHIPPED | OUTPATIENT
Start: 2025-07-08

## 2025-07-15 ENCOUNTER — OFFICE VISIT (OUTPATIENT)
Dept: FAMILY MEDICINE CLINIC | Facility: CLINIC | Age: 65
End: 2025-07-15
Payer: MEDICARE

## 2025-07-15 VITALS
HEART RATE: 74 BPM | DIASTOLIC BLOOD PRESSURE: 70 MMHG | HEIGHT: 73 IN | TEMPERATURE: 97.7 F | BODY MASS INDEX: 32.39 KG/M2 | WEIGHT: 244.4 LBS | OXYGEN SATURATION: 99 % | SYSTOLIC BLOOD PRESSURE: 122 MMHG

## 2025-07-15 DIAGNOSIS — B35.1 ONYCHOMYCOSIS: Primary | ICD-10-CM

## 2025-07-15 DIAGNOSIS — M25.551 PAIN OF RIGHT HIP: ICD-10-CM

## 2025-07-15 PROCEDURE — 99214 OFFICE O/P EST MOD 30 MIN: CPT | Performed by: FAMILY MEDICINE

## 2025-07-15 PROCEDURE — 3078F DIAST BP <80 MM HG: CPT | Performed by: FAMILY MEDICINE

## 2025-07-15 PROCEDURE — G2211 COMPLEX E/M VISIT ADD ON: HCPCS | Performed by: FAMILY MEDICINE

## 2025-07-15 PROCEDURE — 3074F SYST BP LT 130 MM HG: CPT | Performed by: FAMILY MEDICINE

## 2025-07-15 PROCEDURE — 1125F AMNT PAIN NOTED PAIN PRSNT: CPT | Performed by: FAMILY MEDICINE

## 2025-07-15 RX ORDER — CYCLOBENZAPRINE HCL 10 MG
10 TABLET ORAL 2 TIMES DAILY PRN
Qty: 60 TABLET | Refills: 0 | Status: SHIPPED | OUTPATIENT
Start: 2025-07-15

## 2025-07-15 NOTE — PROGRESS NOTES
"Chief Complaint  Arthritis    Subjective          Delmar Augustus Denise Sr. presents to [facility]  Arthritis      History of Present Illness  The patient is a 65-year-old male who presents for a follow-up appointment.      He is experiencing severe hip pain and plans to schedule an appointment at the end of summer. He is not using Voltaren or any other NSAID for pain management.    He was prescribed Lamisil for a duration of 90 days, which he reports as effective. He has approximately one month left of this medication.    MEDICATIONS  Current: Flexeril, Mobic, Lamisil  Discontinued: Celebrex    Review of Systems   Musculoskeletal:  Positive for arthritis.         Objective   Vital Signs:   /70 (BP Location: Right arm, Patient Position: Sitting, Cuff Size: Adult)   Pulse 74   Temp 97.7 °F (36.5 °C) (Temporal)   Ht 185.4 cm (73\")   Wt 111 kg (244 lb 6.4 oz)   SpO2 99%   BMI 32.24 kg/m²     Physical Exam  Lungs were auscultated.    Vital Signs  Blood pressure is normal.    Physical Exam  Constitutional:       General: He is not in acute distress.     Appearance: Normal appearance. He is well-developed and well-groomed. He is not ill-appearing, toxic-appearing or diaphoretic.   HENT:      Head: Normocephalic.      Nose: Nose normal. No congestion or rhinorrhea.      Mouth/Throat:      Mouth: Mucous membranes are moist.      Pharynx: Oropharynx is clear. No oropharyngeal exudate or posterior oropharyngeal erythema.   Eyes:      General: Lids are normal.         Right eye: No discharge.         Left eye: No discharge.      Extraocular Movements: Extraocular movements intact.      Pupils: Pupils are equal, round, and reactive to light.   Neck:      Vascular: No carotid bruit.   Cardiovascular:      Rate and Rhythm: Normal rate and regular rhythm.      Pulses: Normal pulses.      Heart sounds: Normal heart sounds. No murmur heard.     No friction rub. No gallop.   Pulmonary:      " Effort: Pulmonary effort is normal. No respiratory distress.      Breath sounds: Normal breath sounds. No stridor. No wheezing, rhonchi or rales.   Chest:      Chest wall: No tenderness.   Abdominal:      General: Bowel sounds are normal. There is no distension.      Palpations: Abdomen is soft. There is no mass.      Tenderness: There is no abdominal tenderness. There is no right CVA tenderness, left CVA tenderness, guarding or rebound.      Hernia: No hernia is present.   Musculoskeletal:         General: No swelling or tenderness. Normal range of motion.      Cervical back: Normal range of motion and neck supple. No rigidity or tenderness.      Right lower leg: No edema.      Left lower leg: No edema.   Lymphadenopathy:      Cervical: No cervical adenopathy.   Skin:     General: Skin is warm.      Capillary Refill: Capillary refill takes less than 2 seconds.      Coloration: Skin is not jaundiced.      Findings: No bruising, erythema or rash.   Neurological:      General: No focal deficit present.      Mental Status: He is alert and oriented to person, place, and time.      Motor: Motor function is intact. No weakness.      Coordination: Coordination is intact.      Gait: Gait is intact. Gait normal.   Psychiatric:         Attention and Perception: Attention normal.         Mood and Affect: Mood normal.         Speech: Speech normal.         Behavior: Behavior normal.         Cognition and Memory: Cognition normal.         Judgment: Judgment normal.        Result Review :          Results                Assessment and Plan    Diagnoses and all orders for this visit:    1. Pain of right hip (Primary)  -     cyclobenzaprine (FLEXERIL) 10 MG tablet; Take 1 tablet by mouth 2 (Two) Times a Day As Needed for Muscle Spasms.  Dispense: 60 tablet; Refill: 0    2. Onychomycosis      Assessment & Plan  1. Routine follow-up.  His blood pressure readings are within the normal range. He has experienced a weight loss of 4  pounds. Laboratory tests will be conducted during his next visit.    2. Hip pain.  He reports significant hip pain and is counting down the days until his scheduled procedure at the end of summer. He is not taking Celebrex due to stomach issues and has switched back to Mobic. He is advised to continue with Mobic for pain management. A prescription for Flexeril has been sent to his pharmacy.    3. Fungal infection.  He is currently on Lamisil for a 90-day course and reports improvement. He has about a month left on the medication. He is advised to continue the current treatment.    Patient's Body mass index is 32.24 kg/m². indicating that he is obese (BMI >30). Obesity-related health conditions include the following: hypertension and dyslipidemias. Obesity is unchanged. BMI is is above average; BMI management plan is completed. We discussed low calorie, low carb based diet program, portion control, and increasing exercise..    Follow Up   Return in about 3 months (around 10/15/2025).  Patient was given instructions and counseling regarding his condition or for health maintenance advice. Please see specific information pulled into the AVS if appropriate.     This document has been electronically signed by MATTHEW Gaston  July 15, 2025 10:00 EDT     Patient or patient representative verbalized consent for the use of Ambient Listening during the visit with  MATTHEW Gaston for chart documentation. 7/15/2025  09:59 EDT

## 2025-07-31 ENCOUNTER — DISEASE STATE MANAGEMENT VISIT (OUTPATIENT)
Dept: PHARMACY | Facility: HOSPITAL | Age: 65
End: 2025-07-31
Payer: MEDICARE

## 2025-07-31 VITALS
BODY MASS INDEX: 31.2 KG/M2 | WEIGHT: 235.4 LBS | HEART RATE: 73 BPM | DIASTOLIC BLOOD PRESSURE: 79 MMHG | HEIGHT: 73 IN | SYSTOLIC BLOOD PRESSURE: 128 MMHG

## 2025-07-31 RX ORDER — SEMAGLUTIDE 2.4 MG/.75ML
2.4 INJECTION, SOLUTION SUBCUTANEOUS WEEKLY
Qty: 3 ML | Refills: 0 | Status: SHIPPED | OUTPATIENT
Start: 2025-07-31

## 2025-07-31 NOTE — PROGRESS NOTES
[facility]  Weight Management Program      Delmar Denise Sr. is a 65 y.o. male referred to the [facility] by Keisha Dial for clinical pharmacy and specialty pharmacy management of GLP1 for weight management/ASCVD.  The patient denies a personal history of thyroid cancer and denies a personal history of pancreatitis.     Unfortunately, patient is unaware of family history as he reports he was adopted. Screening for GLP-1 use pertinent to family history cannot be completed.     Delmar Denise Sr. has previously tried calorie counting and lifestyle changes for weight loss. Current weight loss efforts include Wegovy.    Patient is currently on Wegovy 2.4 mg weekly. Patient denies any lumps/swelling/hoarseness in neck/throat or abdominal pain. Patient reports tolerating medication well without any side effects. Patient denies any trouble giving injection or any missed doses.     Patient reports that he has continued to do a lot of work outside in the heat and is still active that way.  He reports meeting his water intake goal and no longer eats anything at night, as he is not hungry then. He reports that he doesn't eat later than 6-7pm now.  The only time that he may eat that late is if they get in late from a [employer] game. He reports that he is getting an adequate amount of protein in his diet. He also denies any episodes of low blood sugars, denying any hypoglycemia symptoms.                         Relevant Past Medical History and Co-morbidities  Past Medical History:   Diagnosis Date    Arthritis     Back pain     Coronary artery disease involving native coronary artery of native heart without angina pectoris s/p RCA PCI in 6/2022 08/06/2022    Degenerative disc disease, lumbar     Mixed hyperlipidemia 08/06/2022    NSTEMI (non-ST elevated myocardial infarction) 06/29/2022     Social History     Socioeconomic History    Marital status:    Tobacco Use     Smoking status: Never    Smokeless tobacco: Former     Types: Snuff   Vaping Use    Vaping status: Never Used   Substance and Sexual Activity    Alcohol use: Yes     Comment: occasionally    Drug use: No    Sexual activity: Defer     Partners: Female     Birth control/protection: None       Allergies  Patient has no known allergies.    Current Medication List    Current Outpatient Medications:     aspirin 81 MG EC tablet, Take 1 tablet by mouth Daily., Disp: 90 tablet, Rfl: 0    atorvastatin (LIPITOR) 80 MG tablet, Take 1 tablet by mouth Every Night., Disp: 90 tablet, Rfl: 3    cyclobenzaprine (FLEXERIL) 10 MG tablet, Take 1 tablet by mouth 2 (Two) Times a Day As Needed for Muscle Spasms., Disp: 60 tablet, Rfl: 0    metoprolol tartrate (LOPRESSOR) 25 MG tablet, TAKE ONE TABLET BY MOUTH EVERY 12 HOURS, Disp: 180 tablet, Rfl: 1    nitroglycerin (NITROSTAT) 0.4 MG SL tablet, Place 1 tablet under the tongue Every 5 (Five) Minutes As Needed for Chest Pain (Only if SBP Greater Than 100). Take no more than 3 doses in 15 minutes., Disp: 25 tablet, Rfl: 0    Semaglutide-Weight Management (Wegovy) 2.4 MG/0.75ML solution auto-injector, Inject 0.75 mL under the skin into the appropriate area as directed 1 (One) Time Per Week., Disp: 3 mL, Rfl: 0    terbinafine (lamiSIL) 250 MG tablet, Take 1 tablet by mouth Daily for 90 days., Disp: 90 tablet, Rfl: 0  Medications that contribute to weight gain: None  Drug Interactions  Wegovy+ ASA: Increases risk for hypoglycemia  Weovy + Lopressor: Increases risk for hypoglycemia    Relevant Laboratory Values  Lab Results   Component Value Date    CHOL 76 05/15/2025    TRIG 39 05/15/2025    HDL 51 05/15/2025    LDL 13 05/15/2025     Lab Results   Component Value Date    GLUCOSE 55 (L) 05/15/2025    BUN 18 05/15/2025    CREATININE 1.18 05/15/2025    EGFRIFNONA 77 08/21/2020    BCR 15.3 05/15/2025    K 4.3 05/15/2025    CO2 26.3 05/15/2025    CALCIUM 9.3 05/15/2025    ALBUMIN 4.0 05/15/2025     "AST 23 05/15/2025    ALT 15 05/15/2025     There is no height or weight on file to calculate BMI.  Lab Results   Component Value Date    TSH 1.360 05/15/2025       Vaccinations:   Patient recommended to keep up with routine vaccinations.     Goals of Therapy  Clinical Goals or Therapeutic Targets: Prevent weight associated co-morbidities and complications      Date 8/2/24   8/30/24 9/27/24 10/24/24 11/20/24 12/19/24 1/16/25 2/13/25 3/13/25   Weight (lb) 269.4 lb 261 lb 260.2 lb 255 lb 254.2 lb 254.6 lb 251.2 lb 245.0 lb 247.2 lb   BMI kg/ 35.49 34.43 34.33 33.64 33.54 33.60 33.14 32.33 32.62   Waist Circumference (in)  52.75\" 52\" 51.75\" 51\" 51.5\" 49.5\" 47\" 49\" 48\"     Date 4/10/2025 5/8/25 6/5/25 7/3/25 7/31/25   Weight (lb) 248.6 lb 248.8lb 240.8lb 238.6lb 235.4lb   BMI kg/ 32.81  32.83 31.78 31.48 31.06   Waist Circumference (in)  49.75\" 50\" 49\" 48.75\" 47.5\"       Medication Assessment & Plan    Patient has current BMI of 31.06, which is considered Class I Obesity.  Patient has lost 34 lbs overall. Patient was congratulated on his success thus far!     Will re-order Wegovy 2.4 mg SC weekly.      See initial progress note regarding Keisha Dial's okay for patient to be on medication without being able to complete full screening information.     The following medications will need dose adjustments/closer monitoring once the GLP1 is started: NA    Discussed lifestyle modifications, including diet and exercise.  Patient education provided.     Worked with patient to create SMART Goal(s):   Maintain water intake of at least 64 oz per day  Increase exercise - working outside and staying active  Cut out all late night eating    Previously discussed with patient:     Aim for protein in take of 1.5g/kg/day    Strength Training: Target all major muscle groups, 8-12 repetitions for each exercise and at least 2 x a week on non-consecutive days. With a total weekly target of 60 minutes/week.     Patient was counseled that they " will need to discuss holding medication 2-4 weeks prior to colonoscopies or surgical procedures with their surgery physician due to risk of aspiration and/or incomplete bowel prep.      Will follow-up with patient in clinic in 4 weeks.     Maribel Meza, PharmD  7/31/2025  08:32 EDT

## 2025-08-06 ENCOUNTER — OFFICE VISIT (OUTPATIENT)
Dept: FAMILY MEDICINE CLINIC | Facility: CLINIC | Age: 65
End: 2025-08-06
Payer: MEDICARE

## 2025-08-06 VITALS
TEMPERATURE: 98 F | SYSTOLIC BLOOD PRESSURE: 134 MMHG | HEART RATE: 96 BPM | DIASTOLIC BLOOD PRESSURE: 80 MMHG | OXYGEN SATURATION: 98 % | BODY MASS INDEX: 31.33 KG/M2 | WEIGHT: 236.4 LBS | HEIGHT: 73 IN

## 2025-08-06 DIAGNOSIS — J01.10 ACUTE NON-RECURRENT FRONTAL SINUSITIS: Primary | ICD-10-CM

## 2025-08-06 LAB
EXPIRATION DATE: NORMAL
FLUAV AG UPPER RESP QL IA.RAPID: NOT DETECTED
FLUBV AG UPPER RESP QL IA.RAPID: NOT DETECTED
INTERNAL CONTROL: NORMAL
Lab: NORMAL
SARS-COV-2 AG UPPER RESP QL IA.RAPID: NOT DETECTED

## 2025-08-06 RX ORDER — AZITHROMYCIN 250 MG/1
TABLET, FILM COATED ORAL
Qty: 6 TABLET | Refills: 0 | Status: SHIPPED | OUTPATIENT
Start: 2025-08-06

## 2025-08-11 DIAGNOSIS — M25.551 PAIN OF RIGHT HIP: ICD-10-CM

## 2025-08-11 RX ORDER — CYCLOBENZAPRINE HCL 10 MG
10 TABLET ORAL 2 TIMES DAILY PRN
Qty: 60 TABLET | Refills: 0 | Status: SHIPPED | OUTPATIENT
Start: 2025-08-11

## 2025-08-22 RX ORDER — SEMAGLUTIDE 2.4 MG/.75ML
2.4 INJECTION, SOLUTION SUBCUTANEOUS WEEKLY
Qty: 3 ML | Refills: 0 | Status: SHIPPED | OUTPATIENT
Start: 2025-08-22

## 2025-08-28 ENCOUNTER — DISEASE STATE MANAGEMENT VISIT (OUTPATIENT)
Dept: PHARMACY | Facility: HOSPITAL | Age: 65
End: 2025-08-28
Payer: MEDICARE

## 2025-08-28 VITALS
DIASTOLIC BLOOD PRESSURE: 85 MMHG | WEIGHT: 233.2 LBS | HEIGHT: 73 IN | BODY MASS INDEX: 30.91 KG/M2 | SYSTOLIC BLOOD PRESSURE: 132 MMHG | HEART RATE: 76 BPM

## 2025-08-28 RX ORDER — SEMAGLUTIDE 2.4 MG/.75ML
2.4 INJECTION, SOLUTION SUBCUTANEOUS WEEKLY
Qty: 3 ML | Refills: 0 | Status: SHIPPED | OUTPATIENT
Start: 2025-08-28

## (undated) DEVICE — PK CATH CARD 70

## (undated) DEVICE — RUNWAY RADL W/TOP PAD

## (undated) DEVICE — A2000 MULTI-USE SYRINGE KIT, P/N 701277-003KIT CONTENTS: 100ML CONTRAST RESERVOIR AND TUBING WITH CONTRAST SPIKE AND CLAMP: Brand: A2000 MULTI-USE SYRINGE KIT

## (undated) DEVICE — NC TREK™ CORONARY DILATATION CATHETER 4.5 MM X 15 MM / RAPID-EXCHANGE: Brand: NC TREK™

## (undated) DEVICE — DRAPE, RADIAL, STERILE: Brand: MEDLINE

## (undated) DEVICE — TR BAND RADIAL ARTERY COMPRESSION DEVICE: Brand: TR BAND

## (undated) DEVICE — TRY SKINPREP PVP SCRB W PAINT

## (undated) DEVICE — THE PRONTO LP CATHETER IS INDICATED FOR THE REMOVAL OF FRESH, SOFT EMBOLI AND THROMBI FROM VESSELS IN THE CORONARY AND PERIPHERAL SYSTEM.: Brand: PRONTO® LP EXTRACTION CATHETER

## (undated) DEVICE — DEV INFL MONARCH 25W

## (undated) DEVICE — RUNTHROUGH NS EXTRA FLOPPY PTCA GUIDEWIRE: Brand: RUNTHROUGH

## (undated) DEVICE — ST INF PRI SMRTSTE 20DRP 2VLV 24ML 117

## (undated) DEVICE — LN FLTR ORL/NASL MICROSTREAM NONINTUB A/LNG

## (undated) DEVICE — NDL HYPO ECLPS SFTY 25G 1 1/2IN

## (undated) DEVICE — ADULT DISPOSABLE SINGLE-PATIENT USE PULSE OXIMETER SENSOR: Brand: NONIN

## (undated) DEVICE — GW INQW FIX/CORE PTFE J/3MM .035 260CM

## (undated) DEVICE — CATH INTRAVAS ULTRASND EAGLE EYE 2.9FR

## (undated) DEVICE — SUT VIC 4/0 P3 18IN J494G

## (undated) DEVICE — RADIFOCUS OPTITORQUE ANGIOGRAPHIC CATHETER: Brand: OPTITORQUE

## (undated) DEVICE — CATH F5 INF JL 3.5 100CM: Brand: INFINITI

## (undated) DEVICE — TREK CORONARY DILATATION CATHETER 3.0 MM X 15 MM / RAPID-EXCHANGE: Brand: TREK

## (undated) DEVICE — LN INJ CONTRST FLXCIL HP F/M LL 1200PSI10

## (undated) DEVICE — HOLDER: Brand: DEROYAL

## (undated) DEVICE — GLV SURG PREMIERPRO MIC LTX PF SZ7 BRN

## (undated) DEVICE — ROSEBUD DISSECTORS: Brand: DEROYAL

## (undated) DEVICE — SUT ETHLN 5/0 PS2 18IN 1666G

## (undated) DEVICE — ULTRACLEAN ACCESSORY ELECTRODE, 1 INCH COATED NEEDLE WITH EXTENDED INSULATION: Brand: ULTRACLEAN

## (undated) DEVICE — 6F .070 JR 4 100CM: Brand: CORDIS

## (undated) DEVICE — Device: Brand: MEDEX

## (undated) DEVICE — NC TREK CORONARY DILATATION CATHETER 4.0 MM X 15 MM / RAPID-EXCHANGE: Brand: NC TREK

## (undated) DEVICE — CATH F5 INF PIG145 110CM 6SH: Brand: INFINITI

## (undated) DEVICE — SUT ETHLN 3/0 PS2 18 IN 1669H

## (undated) DEVICE — SUT VIC 4/0 RB1 27IN J214H

## (undated) DEVICE — ST EXT IV SMARTSITE 2VLV SP M LL 5ML IV1

## (undated) DEVICE — DRAPE,UTILTY,TAPE,15X26, 4EA/PK: Brand: MEDLINE

## (undated) DEVICE — MODEL AT P65, P/N 701554-001KIT CONTENTS: HAND CONTROLLER, 3-WAY HIGH-PRESSURE STOPCOCK WITH ROTATING END AND PREMIUM HIGH-PRESSURE TUBING: Brand: ANGIOTOUCH® KIT

## (undated) DEVICE — INTENDED FOR TISSUE SEPARATION, AND OTHER PROCEDURES THAT REQUIRE A SHARP SURGICAL BLADE TO PUNCTURE OR CUT.: Brand: BARD-PARKER ® STAINLESS STEEL BLADES

## (undated) DEVICE — PK HD AND NK 70

## (undated) DEVICE — DRSNG SURESITE WNDW 4X4.5

## (undated) DEVICE — SUT VIC 3/0 SH 27IN J416H

## (undated) DEVICE — GLIDESHEATH SLENDER STAINLESS STEEL KIT: Brand: GLIDESHEATH SLENDER

## (undated) DEVICE — PAD, DEFIB, ADULT, RADIOTRANS, ZOLL: Brand: MEDLINE